# Patient Record
Sex: MALE | Race: WHITE | NOT HISPANIC OR LATINO | Employment: UNEMPLOYED | URBAN - METROPOLITAN AREA
[De-identification: names, ages, dates, MRNs, and addresses within clinical notes are randomized per-mention and may not be internally consistent; named-entity substitution may affect disease eponyms.]

---

## 2018-01-25 ENCOUNTER — OFFICE VISIT CONVERTED (OUTPATIENT)
Dept: SURGERY | Facility: CLINIC | Age: 33
End: 2018-01-25
Attending: SURGERY

## 2018-01-25 ENCOUNTER — CONVERSION ENCOUNTER (OUTPATIENT)
Dept: SURGERY | Facility: CLINIC | Age: 33
End: 2018-01-25

## 2018-08-17 ENCOUNTER — OFFICE VISIT CONVERTED (OUTPATIENT)
Dept: PODIATRY | Facility: CLINIC | Age: 33
End: 2018-08-17
Attending: PODIATRIST

## 2019-02-11 ENCOUNTER — HOSPITAL ENCOUNTER (OUTPATIENT)
Dept: OTHER | Facility: HOSPITAL | Age: 34
Discharge: HOME | End: 2019-02-11
Attending: PHYSICIAN ASSISTANT

## 2019-02-11 LAB
ALBUMIN SERPL-MCNC: 4.4 G/DL (ref 3.5–5)
ALBUMIN/GLOB SERPL: 1.5 {RATIO} (ref 1.4–2.6)
ALP SERPL-CCNC: 60 U/L (ref 53–128)
ALT SERPL-CCNC: 38 U/L (ref 10–40)
ANION GAP SERPL CALC-SCNC: 20 MMOL/L (ref 8–19)
AST SERPL-CCNC: 22 U/L (ref 15–50)
BASOPHILS # BLD AUTO: 0.01 10*3/UL (ref 0–0.2)
BASOPHILS NFR BLD AUTO: 0.13 % (ref 0–3)
BILIRUB SERPL-MCNC: 1.17 MG/DL (ref 0.2–1.3)
BUN SERPL-MCNC: 8 MG/DL (ref 5–25)
BUN/CREAT SERPL: 10 {RATIO} (ref 6–20)
CALCIUM SERPL-MCNC: 9.7 MG/DL (ref 8.7–10.4)
CHLORIDE SERPL-SCNC: 104 MMOL/L (ref 99–111)
CHOLEST SERPL-MCNC: 159 MG/DL (ref 107–200)
CHOLEST/HDLC SERPL: 4 {RATIO} (ref 3–6)
CONV CO2: 23 MMOL/L (ref 22–32)
CONV TOTAL PROTEIN: 7.3 G/DL (ref 6.3–8.2)
CREAT UR-MCNC: 0.81 MG/DL (ref 0.7–1.2)
EOSINOPHIL # BLD AUTO: 0.05 10*3/UL (ref 0–0.7)
EOSINOPHIL # BLD AUTO: 0.78 % (ref 0–7)
ERYTHROCYTE [DISTWIDTH] IN BLOOD BY AUTOMATED COUNT: 11.6 % (ref 11.5–14.5)
GFR SERPLBLD BASED ON 1.73 SQ M-ARVRAT: >60 ML/MIN/{1.73_M2}
GLOBULIN UR ELPH-MCNC: 2.9 G/DL (ref 2–3.5)
GLUCOSE SERPL-MCNC: 101 MG/DL (ref 70–99)
HBA1C MFR BLD: 16.7 G/DL (ref 14–18)
HCT VFR BLD AUTO: 46.4 % (ref 42–52)
HDLC SERPL-MCNC: 40 MG/DL (ref 40–60)
LDLC SERPL CALC-MCNC: 86 MG/DL (ref 70–100)
LYMPHOCYTES # BLD AUTO: 2.54 10*3/UL (ref 1–5)
MCH RBC QN AUTO: 31.9 PG (ref 27–31)
MCHC RBC AUTO-ENTMCNC: 35.9 G/DL (ref 33–37)
MCV RBC AUTO: 88.8 FL (ref 80–96)
MONOCYTES # BLD AUTO: 0.52 10*3/UL (ref 0.2–1.2)
MONOCYTES NFR BLD AUTO: 7.59 % (ref 3–10)
NEUTROPHILS # BLD AUTO: 3.76 10*3/UL (ref 2–8)
NEUTROPHILS NFR BLD AUTO: 54.6 % (ref 30–85)
NRBC BLD AUTO-RTO: 0 % (ref 0–0.01)
OSMOLALITY SERPL CALC.SUM OF ELEC: 294 MOSM/KG (ref 273–304)
PLATELET # BLD AUTO: 226 10*3/UL (ref 130–400)
PMV BLD AUTO: 8.6 FL (ref 7.4–10.4)
POTASSIUM SERPL-SCNC: 4 MMOL/L (ref 3.5–5.3)
RBC # BLD AUTO: 5.22 10*6/UL (ref 4.7–6.1)
SODIUM SERPL-SCNC: 143 MMOL/L (ref 135–147)
T4 FREE SERPL-MCNC: 1.1 NG/DL (ref 0.9–1.8)
TRIGL SERPL-MCNC: 165 MG/DL (ref 40–150)
TSH SERPL-ACNC: 1.88 M[IU]/L (ref 0.27–4.2)
VARIANT LYMPHS NFR BLD MANUAL: 36.9 % (ref 20–45)
VLDLC SERPL-MCNC: 33 MG/DL (ref 5–37)
WBC # BLD AUTO: 6.88 10*3/UL (ref 4.8–10.8)

## 2019-03-28 ENCOUNTER — OFFICE VISIT CONVERTED (OUTPATIENT)
Dept: CARDIOLOGY | Facility: CLINIC | Age: 34
End: 2019-03-28
Attending: INTERNAL MEDICINE

## 2019-10-03 ENCOUNTER — OFFICE VISIT CONVERTED (OUTPATIENT)
Dept: CARDIOLOGY | Facility: CLINIC | Age: 34
End: 2019-10-03
Attending: INTERNAL MEDICINE

## 2019-10-03 ENCOUNTER — CONVERSION ENCOUNTER (OUTPATIENT)
Dept: CARDIOLOGY | Facility: CLINIC | Age: 34
End: 2019-10-03

## 2020-05-06 ENCOUNTER — TELEPHONE CONVERTED (OUTPATIENT)
Dept: CARDIOLOGY | Facility: CLINIC | Age: 35
End: 2020-05-06
Attending: INTERNAL MEDICINE

## 2020-11-12 ENCOUNTER — OFFICE VISIT CONVERTED (OUTPATIENT)
Dept: CARDIOLOGY | Facility: CLINIC | Age: 35
End: 2020-11-12
Attending: INTERNAL MEDICINE

## 2021-05-10 ENCOUNTER — OFFICE VISIT CONVERTED (OUTPATIENT)
Dept: CARDIOLOGY | Facility: CLINIC | Age: 36
End: 2021-05-10
Attending: INTERNAL MEDICINE

## 2021-05-13 NOTE — PROGRESS NOTES
Progress Note      Patient Name: Vance Lorenzo   Patient ID: [id number]   Sex: Male   YOB: 1985    Primary Care Provider: Catalino PALMA   Referring Provider: Damien Castellano MD    Visit Date: May 6, 2020    Provider: Damien Castellano MD   Location: [facility]   Location Address: [address] [place] [address], [facility]   [place]  [id number]   Location Phone: [phone]          Chief Complaint  · Atrial fibrillation       History Of Present Illness  Video Conferencing Visit  Vance Lorenzo is a 35 year old /White male who is presenting for evaluation via video conferencing. Verbal consent obtained before beginning visit. Telehealth due to COVID-19. He has hypertension, paroxysmal atrial fibrillation and morbid obesity. He has been doing well. The patient has had some increased weight gain of about 15 pounds as well as some numbness in his left foot. He had one episode of atypical chest pain.   The following staff were present during this visit: Provider only.   PAST MEDICAL HISTORY: Significant for hypertension, paroxysmal atrial fibrillation, dyslipidemia.   FAMILY HISTORY: Negative for diabetes, hypertension and heart disease.   PSYCHOSOCIAL HISTORY: He never used alcohol or tobacco.   CURRENT MEDICATIONS: include Eliquis 5 mg b.i.d.; Metoprolol 100 mg b.i.d; Atorvastatin 20 mg daily. The dosage and frequency of the medications were reviewed with the patient.       Review of Systems  · Cardiovascular  o Admits  o : chest pain or angina pectoris   o Denies  o : palpitations (fast, fluttering, or skipping beats), swelling (feet, ankles, hands), shortness of breath while walking or lying flat  · Respiratory  o Denies  o : chronic or frequent cough, asthma or wheezing      Vitals     Per patient, at-home vitals are blood pressure 121/58, heart rate of 73.  Weight 370.           Assessment     ASSESSMENT AND PLAN:    1.  Paroxysmal atrial fibrillation:  Patient  maintaining normal sinus rhythm.  Continue with Eliquis. Counseled       him on weight loss and exercise as well as recommended he consider workup for sleep apnea.  2.  Hypertension controlled:  Encouraged weight loss and exercise.    MD Radha Ayala/bridger         This note was transcribed by Elizabeth Purcell.  dmd/radha   The above service was transcribed by Elizabeth Purcell, and I attest to the accuracy of the note.  RADHA.               Electronically Signed by: Elizabeth Purcell-, -Author on May 13, 2020 06:10:22 AM  Electronically Co-signed by: Damien Castellano MD -Reviewer on May 13, 2020 12:17:12 PM

## 2021-05-13 NOTE — PROGRESS NOTES
"   Progress Note      Patient Name: Vance Lorenzo   Patient ID: [id number]   Sex: Male   YOB: 1985    Primary Care Provider: Catalino PALMA   Referring Provider: Damien Castellano MD    Visit Date: November 12, 2020    Provider: Damien Castellano MD   Location: [facility]   Location Address: [address] [place] [address], [address]   [place]  [id number]   Location Phone: [phone]          Chief Complaint     Afib.       History Of Present Illness  Vance Lorenzo is a 35 year old /White male with a history of paroxysmal atrial fibrillation and morbid obesity who has been doing well. No new complaints or problems.   PAST MEDICAL HISTORY: Dyslipidemia; Hypertension; Paroxysmal atrial fibrillation.   PSYCHOSOCIAL HISTORY: Denies alcohol use.   CURRENT MEDICATIONS: Metoprolol succinate 100 mg b.i.d.; Eliquis 5 mg b.i.d.; atorvastatin 20 mg daily.       Review of Systems  · Cardiovascular  o Denies  o : palpitations (fast, fluttering, or skipping beats), swelling (feet, ankles, hands), shortness of breath while walking or lying flat, chest pain or angina pectoris   · Respiratory  o Denies  o : chronic or frequent cough      Vitals  Date Time BP Position Site L\R Cuff Size HR RR TEMP (F) WT  HT  BMI kg/m2 BSA m2 O2 Sat FR L/min FiO2 HC       11/12/2020 10:51 /82 Sitting    74 - R   364lbs 0oz 6'  5\" 43.16 2.99       11/12/2020 10:51 /86 Sitting                       Physical Examination  · Constitutional  o Appearance  o : Awake, alert, in no acute distress.   · Eyes  o Conjunctivae  o : Normal.  · Ears, Nose, Mouth and Throat  o Oral Cavity  o :   § Oral Mucosa  § : Normal.  · Neck  o Inspection/Palpation  o : No JVD. Good carotid upstroke. No thyromegaly.  · Respiratory  o Respiratory  o : Good respiratory effort. Clear to percussion and auscultation.  · Cardiovascular  o Heart  o :   § Auscultation of Heart  § : S1, S2 normal. Regular rate and rhythm without murmurs, gallops, " or rubs.  o Peripheral Vascular System  o :   § Extremities  § : Trace lower extremity edema.  · Gastrointestinal  o Abdominal Examination  o : Soft. No tenderness or masses felt. No hepatosplenomegaly. Abdominal aorta is not palpable.  · EKG  o EKG  o : Performed in the office today.  o Indications  o : Atrial fibrillation.  o Results  o : Normal sinus rhythm.  o Comparison  o : No change from prior EKG.           Assessment     ASSESSMENT & PLAN:    Paroxysmal atrial fibrillation, maintaining normal sinus rhythm.  Continue with current dose of metoprolol, as well as Eliquis for CVA prevention.  Counseled on importance of weight loss and exercise to minimize recurrence of Afib.             Electronically Signed by: Charo Valadez-, Other -Author on November 17, 2020 01:57:30 PM  Electronically Co-signed by: Damien Castellano MD -Reviewer on November 18, 2020 09:38:16 AM

## 2021-05-14 VITALS
BODY MASS INDEX: 37.19 KG/M2 | DIASTOLIC BLOOD PRESSURE: 82 MMHG | HEIGHT: 77 IN | WEIGHT: 315 LBS | HEART RATE: 74 BPM | SYSTOLIC BLOOD PRESSURE: 148 MMHG

## 2021-05-15 VITALS
WEIGHT: 315 LBS | SYSTOLIC BLOOD PRESSURE: 138 MMHG | DIASTOLIC BLOOD PRESSURE: 88 MMHG | BODY MASS INDEX: 37.19 KG/M2 | HEIGHT: 77 IN | HEART RATE: 66 BPM

## 2021-05-15 VITALS
DIASTOLIC BLOOD PRESSURE: 80 MMHG | HEIGHT: 77 IN | SYSTOLIC BLOOD PRESSURE: 152 MMHG | WEIGHT: 315 LBS | BODY MASS INDEX: 37.19 KG/M2 | HEART RATE: 72 BPM

## 2021-05-16 VITALS — BODY MASS INDEX: 37.19 KG/M2 | RESPIRATION RATE: 18 BRPM | HEIGHT: 77 IN | WEIGHT: 315 LBS

## 2021-05-16 VITALS
HEIGHT: 77 IN | BODY MASS INDEX: 37.19 KG/M2 | HEART RATE: 71 BPM | SYSTOLIC BLOOD PRESSURE: 151 MMHG | DIASTOLIC BLOOD PRESSURE: 83 MMHG | WEIGHT: 315 LBS | OXYGEN SATURATION: 97 %

## 2021-06-05 NOTE — PROGRESS NOTES
"   Progress Note      Patient Name: Vanec Lorenzo   Patient ID: [id number]   Sex: Male   YOB: 1985    Primary Care Provider: Catalino PALMA   Referring Provider: Damien Castellano MD    Visit Date: May 10, 2021    Provider: Damien Castellano MD   Location: [facility]   Location Address: [address] [place] [address], [facility]   [place]  [id number]   Location Phone: [phone]          Chief Complaint     Atrial fibrillation.       History Of Present Illness  REFERRING CARE PROVIDER: Damien Castellano MD   Vance Lorenzo is a 36 year old /White male with paroxysmal atrial fibrillation, morbid obesity, hypertension, and dyslipidemia. He has had no recurrent tachycardia spells or symptoms.   PAST MEDICAL HISTORY: Dyslipidemia; Hypertension; Paroxysmal atrial fibrillation.   PSYCHOSOCIAL HISTORY: Denies alcohol consumption.   CURRENT MEDICATIONS: Medications have been reviewed and are as stated.      ALLERGIES:  No known drug allergies.       Review of Systems  · Cardiovascular  o Denies  o : palpitations (fast, fluttering, or skipping beats), swelling (feet, ankles, hands), shortness of breath while walking or lying flat, chest pain or angina pectoris   · Respiratory  o Denies  o : chronic or frequent cough      Vitals  Date Time BP Position Site L\R Cuff Size HR RR TEMP (F) WT  HT  BMI kg/m2 BSA m2 O2 Sat FR L/min FiO2 HC       05/10/2021 01:50 /84 Sitting    66 - R   365lbs 0oz 6'  5\" 43.28 3             Physical Examination  · Constitutional  o Appearance  o : Awake, alert, in no acute distress.   · Eyes  o Conjunctivae  o : Normal.  · Ears, Nose, Mouth and Throat  o Oral Cavity  o :   § Oral Mucosa  § : Normal.  · Neck  o Inspection/Palpation  o : No JVD. Good carotid upstroke. No thyromegaly.  · Respiratory  o Respiratory  o : Good respiratory effort. Clear to percussion and auscultation.  · Cardiovascular  o Heart  o :   § Auscultation of Heart  § : S1, S2 normal. Regular rate and " rhythm without murmurs, gallops, or rubs.  o Peripheral Vascular System  o :   § Extremities  § : Good femoral and pedal pulses. No pedal edema.  · Gastrointestinal  o Abdominal Examination  o : Soft. No tenderness or masses felt. No hepatosplenomegaly. Abdominal aorta is not palpable.          Assessment     1.  Paroxysmal atrial fibrillation, symptomatic. Maintain in normal sinus rhythm. On Eliquis 5 mg b.i.d. for CVA prevention. Repeat EKG on next visit and on metoprolol 100 mg twice a day for rate control as well as blood pressure medications.  2.  Hypertension, controlled. Continue to stress the importance of both exercise and weight loss for his blood pressure as well as for his atrial fibrillation.        Damien Castellano MD  /               Electronically Signed by: May Espinal-, OT -Author on May 11, 2021 07:20:48 AM  Electronically Co-signed by: Damien Castellano MD -Reviewer on May 12, 2021 03:38:16 PM

## 2021-07-08 RX ORDER — METOPROLOL SUCCINATE 100 MG/1
TABLET, EXTENDED RELEASE ORAL
Qty: 60 TABLET | Refills: 10 | Status: SHIPPED | OUTPATIENT
Start: 2021-07-08 | End: 2022-06-06

## 2021-07-15 ENCOUNTER — OFFICE VISIT (OUTPATIENT)
Dept: OTOLARYNGOLOGY | Facility: CLINIC | Age: 36
End: 2021-07-15

## 2021-07-15 ENCOUNTER — PROCEDURE VISIT (OUTPATIENT)
Dept: OTOLARYNGOLOGY | Facility: CLINIC | Age: 36
End: 2021-07-15

## 2021-07-15 VITALS
SYSTOLIC BLOOD PRESSURE: 140 MMHG | WEIGHT: 315 LBS | DIASTOLIC BLOOD PRESSURE: 84 MMHG | HEART RATE: 66 BPM | BODY MASS INDEX: 37.19 KG/M2 | HEIGHT: 77 IN

## 2021-07-15 VITALS — WEIGHT: 315 LBS | BODY MASS INDEX: 37.19 KG/M2 | TEMPERATURE: 97.3 F | HEIGHT: 77 IN

## 2021-07-15 DIAGNOSIS — H93.13 TINNITUS OF BOTH EARS: ICD-10-CM

## 2021-07-15 DIAGNOSIS — H92.13 OTORRHEA OF BOTH EARS: ICD-10-CM

## 2021-07-15 DIAGNOSIS — H93.13 TINNITUS OF BOTH EARS: Primary | ICD-10-CM

## 2021-07-15 PROCEDURE — 92567 TYMPANOMETRY: CPT | Performed by: AUDIOLOGIST

## 2021-07-15 PROCEDURE — 92557 COMPREHENSIVE HEARING TEST: CPT | Performed by: AUDIOLOGIST

## 2021-07-15 PROCEDURE — 99203 OFFICE O/P NEW LOW 30 MIN: CPT | Performed by: OTOLARYNGOLOGY

## 2021-07-15 RX ORDER — LISINOPRIL AND HYDROCHLOROTHIAZIDE 20; 12.5 MG/1; MG/1
1 TABLET ORAL DAILY
COMMUNITY
End: 2021-12-01

## 2021-07-15 RX ORDER — ATORVASTATIN CALCIUM 20 MG/1
20 TABLET, FILM COATED ORAL
COMMUNITY
Start: 2021-07-08 | End: 2021-12-14

## 2021-07-15 RX ORDER — ACETAMINOPHEN 500 MG
500 TABLET ORAL AS NEEDED
COMMUNITY
End: 2022-08-01

## 2021-07-15 RX ORDER — APIXABAN 5 MG/1
5 TABLET, FILM COATED ORAL 2 TIMES DAILY
COMMUNITY
Start: 2021-07-08 | End: 2021-12-01 | Stop reason: SDUPTHER

## 2021-07-15 NOTE — PROGRESS NOTES
Patient Name: Vance Lorenzo   Visit Date: 07/15/2021   Patient ID: [id number]  Provider: Varun Camacho MD    Sex: male  Location: [facility]   YOB: 1985  Location Address: [address] [place] [address], [address] [place] [address],?[place]?[id number]    Primary Care Provider Catalino Emery PA  Location Phone: [phone]    Referring Provider: No ref. provider found        Chief Complaint  Tinnitus    Subjective    History of Present Illness  Vance Lorenzo is a 36 y.o. male who presents to [facility] today as a consult from No ref. provider found.    He presents the clinic today for evaluation of ear ringing that has been present for about a year.  He notes no previous history of eustachian tube dysfunction recurrent otitis as a child.  He notes that his hearing is pretty good in day-to-day situations.  He denies any frequent issues with ear infections or ear pressure or other symptoms.  He notes that the ear ringing sounds high-pitched and nonpulsatile, kind of like a static sound.  It is equal in both ears.  He does listen to ear buds, sometimes at a loud level.  He denies any family history of hearing loss and early age.  He denies any significant loud noise exposure, but does have a history of shooting guns recreationally, as well as his music. He has not had a hearing test in quite some time.    Past Medical History:   Diagnosis Date   • Atrial fibrillation (CMS/HCC)    • Dermatitis 08/17/2018   • Foot pain, left 08/17/2018   • Foot pain, right 08/17/2018   • High blood pressure    • High cholesterol    • Tinnitus    • Xerosis of skin 08/17/2018       History reviewed. No pertinent surgical history.      Current Outpatient Medications:   •  atorvastatin (LIPITOR) 20 MG tablet, Take 20 mg by mouth every night at bedtime., Disp: , Rfl:   •  Eliquis 5 MG tablet tablet, Take 5 mg by mouth 2 (Two) Times a Day., Disp: , Rfl:   •   "lisinopril-hydrochlorothiazide (PRINZIDE,ZESTORETIC) 20-12.5 MG per tablet, Take 1 tablet by mouth Daily., Disp: , Rfl:   •  metoprolol succinate XL (TOPROL-XL) 100 MG 24 hr tablet, TAKE ONE TABLET BY MOUTH TWICE DAILY, Disp: 60 tablet, Rfl: 10  •  acetaminophen (TYLENOL) 500 MG tablet, Take 500 mg by mouth., Disp: , Rfl:      Not on File    Family History   Problem Relation Age of Onset   • Breast cancer Maternal Grandmother         50s   • Cancer Paternal Grandmother    • Cancer Paternal Grandfather         Social History     Social History Narrative   • Not on file       Objective     Vital Signs:   Temp 97.3 °F (36.3 °C) (Temporal)   Ht 195.6 cm (77\")   Wt (!) 169 kg (371 lb 12.8 oz)   BMI 44.09 kg/m²       Physical Exam         Constitutional   Appearance  · : well developed, well-nourished, alert and in no acute distress, voice clear and strong    Head  Inspection  · : no deformities or lesions  Face  Inspection  · : No facial lesions; House-Brackmann I/VI bilaterally  Palpation  · : No TMJ crepitus nor  muscle tenderness bilaterally    Eyes  Vision  Visual Fields  · : Extraocular movements are intact. No spontaneous or gaze-induced nystagmus.  Conjunctivae  · : clear  Sclerae  · : clear  Pupils and Irises  · : pupils equal, round, and reactive to light.     Ears, Nose, Mouth and Throat    Ears    External Ears  · : appearance within normal limits, no lesions present  Otoscopic Examination  · : Tympanic membrane appearance within normal limits bilaterally without perforations, well-aerated middle ears  Hearing  · : intact to conversational voice both ears  Tunning fork testing:     :    Nose    External Nose  · : appearance normal  Intranasal Exam  · : mucosa within normal limits, vestibules normal, no intranasal lesions present, septum midline, sinuses non tender to percussion  Oral Cavity    Oral Mucosa  · : oral mucosa normal without pallor or cyanosis  Lips  · : lip appearance " normal  Teeth  · : normal dentition for age  Gums  · : gums pink, non-swollen, no bleeding present  Tongue  · : tongue appearance normal; normal mobility  Palate  · : hard palate normal, soft palate appearance normal with symmetric mobility    Throat    Oropharynx  · : no inflammation or lesions present, tonsils within normal limits  Hypopharynx  · : appearance within normal limits, superior epiglottis within normal limits  Larynx  · : appearance within normal limits, vocal cords within normal limits, no lesions present    Neck  Inspection/Palpation  · : normal appearance, no masses or tenderness, trachea midline; thyroid size normal, nontender, no nodules or masses present on palpation    Respiratory  Respiratory Effort  · : breathing unlabored  Inspection of Chest  · : normal appearance, no retractions    Cardiovascular  Heart  · : regular rate and rhythm    Lymphatic  Neck  · : no lymphadenopathy present  Supraclavicular Nodes  · : no lymphadenopathy present  Preauricular Nodes  · : no lymphadenopathy present    Skin and Subcutaneous Tissue  General Inspection  · : Regarding face and neck - there are no rashes present, no lesions present, and no areas of discoloration    Neurologic  Cranial Nerves  · : cranial nerves II-XII are grossly intact bilaterally  Gait and Station  · : normal gait, able to stand without diffculty    Psychiatric  Judgement and Insight  · : judgment and insight intact  Mood and Affect  · : mood normal, affect appropriate          Assessment and Plan    Diagnoses and all orders for this visit:    1. Tinnitus of both ears (Primary)    2. Otorrhea of both ears    Examination revealed normal-appearing ears on both sides with healthy appearing eardrums and well-aerated middle ears.  An audiogram was obtained showing normal hearing bilaterally, however there is a dip between 2000 - 6000 Hz going down to borderline normal hearing.  This may be the early signs of a noise notch, and may be there  reason for the ear ringing.  He is also on a number of medications for atrial fibrillation, but has been on them for many years.  I discussed the possibility of medications being contributory to his ear ringing as well.  Tympanograms were normal word discrimination scores were 100% bilaterally.  We discussed loud noise exposure and preventative precautions.  I also recommended melatonin 3 mg nightly for 1 month as a trial as well as background noise distraction techniques for the ear ringing.    Follow Up   No follow-ups on file.  Patient was given instructions and counseling regarding his condition or for health maintenance advice. Please see specific information pulled into the AVS if appropriate.    The patient is a 66y Female complaining of

## 2021-10-27 ENCOUNTER — HOSPITAL ENCOUNTER (EMERGENCY)
Facility: HOSPITAL | Age: 36
Discharge: HOME | End: 2021-10-27
Attending: EMERGENCY MEDICINE | Admitting: EMERGENCY MEDICINE

## 2021-10-27 ENCOUNTER — APPOINTMENT (OUTPATIENT)
Dept: CT IMAGING | Facility: HOSPITAL | Age: 36
End: 2021-10-27

## 2021-10-27 ENCOUNTER — APPOINTMENT (OUTPATIENT)
Dept: ULTRASOUND IMAGING | Facility: HOSPITAL | Age: 36
End: 2021-10-27

## 2021-10-27 VITALS
HEART RATE: 84 BPM | DIASTOLIC BLOOD PRESSURE: 80 MMHG | SYSTOLIC BLOOD PRESSURE: 154 MMHG | OXYGEN SATURATION: 97 % | HEIGHT: 77 IN | RESPIRATION RATE: 18 BRPM | TEMPERATURE: 98.5 F | WEIGHT: 315 LBS | BODY MASS INDEX: 37.19 KG/M2

## 2021-10-27 DIAGNOSIS — N28.1 RENAL CYST: ICD-10-CM

## 2021-10-27 DIAGNOSIS — R10.9 RIGHT FLANK PAIN: Primary | ICD-10-CM

## 2021-10-27 LAB
ALBUMIN SERPL-MCNC: 4.8 G/DL (ref 3.5–5.2)
ALBUMIN/GLOB SERPL: 1.8 G/DL
ALP SERPL-CCNC: 62 U/L (ref 39–117)
ALT SERPL W P-5'-P-CCNC: 40 U/L (ref 1–41)
ANION GAP SERPL CALCULATED.3IONS-SCNC: 14 MMOL/L (ref 5–15)
AST SERPL-CCNC: 28 U/L (ref 1–40)
BACTERIA UR QL AUTO: ABNORMAL /HPF
BASOPHILS # BLD AUTO: 0.04 10*3/MM3 (ref 0–0.2)
BASOPHILS NFR BLD AUTO: 0.6 % (ref 0–1.5)
BILIRUB SERPL-MCNC: 1 MG/DL (ref 0–1.2)
BILIRUB UR QL STRIP: NEGATIVE
BUN SERPL-MCNC: 10 MG/DL (ref 6–20)
BUN/CREAT SERPL: 11.4 (ref 7–25)
CALCIUM SPEC-SCNC: 9.5 MG/DL (ref 8.6–10.5)
CHLORIDE SERPL-SCNC: 103 MMOL/L (ref 98–107)
CLARITY UR: CLEAR
CO2 SERPL-SCNC: 24 MMOL/L (ref 22–29)
COLOR UR: YELLOW
CREAT SERPL-MCNC: 0.88 MG/DL (ref 0.76–1.27)
DEPRECATED RDW RBC AUTO: 40.6 FL (ref 37–54)
EOSINOPHIL # BLD AUTO: 0.07 10*3/MM3 (ref 0–0.4)
EOSINOPHIL NFR BLD AUTO: 1 % (ref 0.3–6.2)
ERYTHROCYTE [DISTWIDTH] IN BLOOD BY AUTOMATED COUNT: 12.4 % (ref 12.3–15.4)
GFR SERPL CREATININE-BSD FRML MDRD: 99 ML/MIN/1.73
GLOBULIN UR ELPH-MCNC: 2.7 GM/DL
GLUCOSE SERPL-MCNC: 165 MG/DL (ref 65–99)
GLUCOSE UR STRIP-MCNC: NEGATIVE MG/DL
HCT VFR BLD AUTO: 45.6 % (ref 37.5–51)
HGB BLD-MCNC: 16 G/DL (ref 13–17.7)
HGB UR QL STRIP.AUTO: ABNORMAL
HOLD SPECIMEN: NORMAL
HOLD SPECIMEN: NORMAL
HYALINE CASTS UR QL AUTO: ABNORMAL /LPF
IMM GRANULOCYTES # BLD AUTO: 0.05 10*3/MM3 (ref 0–0.05)
IMM GRANULOCYTES NFR BLD AUTO: 0.7 % (ref 0–0.5)
KETONES UR QL STRIP: NEGATIVE
LEUKOCYTE ESTERASE UR QL STRIP.AUTO: NEGATIVE
LIPASE SERPL-CCNC: 41 U/L (ref 13–60)
LYMPHOCYTES # BLD AUTO: 2 10*3/MM3 (ref 0.7–3.1)
LYMPHOCYTES NFR BLD AUTO: 28.9 % (ref 19.6–45.3)
MCH RBC QN AUTO: 31.6 PG (ref 26.6–33)
MCHC RBC AUTO-ENTMCNC: 35.1 G/DL (ref 31.5–35.7)
MCV RBC AUTO: 90.1 FL (ref 79–97)
MONOCYTES # BLD AUTO: 0.52 10*3/MM3 (ref 0.1–0.9)
MONOCYTES NFR BLD AUTO: 7.5 % (ref 5–12)
NEUTROPHILS NFR BLD AUTO: 4.25 10*3/MM3 (ref 1.7–7)
NEUTROPHILS NFR BLD AUTO: 61.3 % (ref 42.7–76)
NITRITE UR QL STRIP: NEGATIVE
NRBC BLD AUTO-RTO: 0 /100 WBC (ref 0–0.2)
PH UR STRIP.AUTO: 5.5 [PH] (ref 5–8)
PLATELET # BLD AUTO: 222 10*3/MM3 (ref 140–450)
PMV BLD AUTO: 11.6 FL (ref 6–12)
POTASSIUM SERPL-SCNC: 4 MMOL/L (ref 3.5–5.2)
PROT SERPL-MCNC: 7.5 G/DL (ref 6–8.5)
PROT UR QL STRIP: NEGATIVE
RBC # BLD AUTO: 5.06 10*6/MM3 (ref 4.14–5.8)
RBC # UR: ABNORMAL /HPF
REF LAB TEST METHOD: ABNORMAL
SODIUM SERPL-SCNC: 141 MMOL/L (ref 136–145)
SP GR UR STRIP: 1.02 (ref 1–1.03)
SQUAMOUS #/AREA URNS HPF: ABNORMAL /HPF
UROBILINOGEN UR QL STRIP: ABNORMAL
WBC # BLD AUTO: 6.93 10*3/MM3 (ref 3.4–10.8)
WBC UR QL AUTO: ABNORMAL /HPF
WHOLE BLOOD HOLD SPECIMEN: NORMAL
WHOLE BLOOD HOLD SPECIMEN: NORMAL

## 2021-10-27 PROCEDURE — 83690 ASSAY OF LIPASE: CPT

## 2021-10-27 PROCEDURE — 76775 US EXAM ABDO BACK WALL LIM: CPT

## 2021-10-27 PROCEDURE — 80053 COMPREHEN METABOLIC PANEL: CPT

## 2021-10-27 PROCEDURE — 81001 URINALYSIS AUTO W/SCOPE: CPT

## 2021-10-27 PROCEDURE — 0 IOPAMIDOL PER 1 ML: Performed by: EMERGENCY MEDICINE

## 2021-10-27 PROCEDURE — 99283 EMERGENCY DEPT VISIT LOW MDM: CPT

## 2021-10-27 PROCEDURE — 74177 CT ABD & PELVIS W/CONTRAST: CPT

## 2021-10-27 PROCEDURE — 85025 COMPLETE CBC W/AUTO DIFF WBC: CPT

## 2021-10-27 PROCEDURE — 36415 COLL VENOUS BLD VENIPUNCTURE: CPT

## 2021-10-27 RX ORDER — SODIUM CHLORIDE 0.9 % (FLUSH) 0.9 %
10 SYRINGE (ML) INJECTION AS NEEDED
Status: DISCONTINUED | OUTPATIENT
Start: 2021-10-27 | End: 2021-10-28 | Stop reason: HOSPADM

## 2021-10-27 RX ADMIN — IOPAMIDOL 100 ML: 755 INJECTION, SOLUTION INTRAVENOUS at 19:42

## 2021-12-01 ENCOUNTER — OFFICE VISIT (OUTPATIENT)
Dept: CARDIOLOGY | Facility: CLINIC | Age: 36
End: 2021-12-01

## 2021-12-01 VITALS
HEIGHT: 77 IN | HEART RATE: 66 BPM | SYSTOLIC BLOOD PRESSURE: 154 MMHG | BODY MASS INDEX: 37.19 KG/M2 | WEIGHT: 315 LBS | DIASTOLIC BLOOD PRESSURE: 88 MMHG

## 2021-12-01 DIAGNOSIS — I10 ESSENTIAL HYPERTENSION: ICD-10-CM

## 2021-12-01 DIAGNOSIS — E78.5 HYPERLIPIDEMIA LDL GOAL <100: ICD-10-CM

## 2021-12-01 DIAGNOSIS — I48.0 PAROXYSMAL ATRIAL FIBRILLATION (HCC): Primary | ICD-10-CM

## 2021-12-01 PROBLEM — H93.19 TINNITUS: Status: ACTIVE | Noted: 2021-12-01

## 2021-12-01 PROCEDURE — 99214 OFFICE O/P EST MOD 30 MIN: CPT | Performed by: NURSE PRACTITIONER

## 2021-12-01 PROCEDURE — 93000 ELECTROCARDIOGRAM COMPLETE: CPT | Performed by: INTERNAL MEDICINE

## 2021-12-01 RX ORDER — APIXABAN 5 MG/1
5 TABLET, FILM COATED ORAL 2 TIMES DAILY
Qty: 60 TABLET | Refills: 11 | Status: SHIPPED | OUTPATIENT
Start: 2021-12-01 | End: 2022-11-30

## 2021-12-01 NOTE — PATIENT INSTRUCTIONS
"Low-Sodium Eating Plan  Sodium, which is an element that makes up salt, helps you maintain a healthy balance of fluids in your body. Too much sodium can increase your blood pressure and cause fluid and waste to be held in your body.  Your health care provider or dietitian may recommend following this plan if you have high blood pressure (hypertension), kidney disease, liver disease, or heart failure. Eating less sodium can help lower your blood pressure, reduce swelling, and protect your heart, liver, and kidneys.  What are tips for following this plan?  Reading food labels  · The Nutrition Facts label lists the amount of sodium in one serving of the food. If you eat more than one serving, you must multiply the listed amount of sodium by the number of servings.  · Choose foods with less than 140 mg of sodium per serving.  · Avoid foods with 300 mg of sodium or more per serving.  Shopping    · Look for lower-sodium products, often labeled as \"low-sodium\" or \"no salt added.\"  · Always check the sodium content, even if foods are labeled as \"unsalted\" or \"no salt added.\"  · Buy fresh foods.  ? Avoid canned foods and pre-made or frozen meals.  ? Avoid canned, cured, or processed meats.  · Buy breads that have less than 80 mg of sodium per slice.    Cooking    · Eat more home-cooked food and less restaurant, buffet, and fast food.  · Avoid adding salt when cooking. Use salt-free seasonings or herbs instead of table salt or sea salt. Check with your health care provider or pharmacist before using salt substitutes.  · Cook with plant-based oils, such as canola, sunflower, or olive oil.    Meal planning  · When eating at a restaurant, ask that your food be prepared with less salt or no salt, if possible. Avoid dishes labeled as brined, pickled, cured, smoked, or made with soy sauce, miso, or teriyaki sauce.  · Avoid foods that contain MSG (monosodium glutamate). MSG is sometimes added to Chinese food, bouillon, and some " "canned foods.  · Make meals that can be grilled, baked, poached, roasted, or steamed. These are generally made with less sodium.  General information  Most people on this plan should limit their sodium intake to 1,500-2,000 mg (milligrams) of sodium each day.  What foods should I eat?  Fruits  Fresh, frozen, or canned fruit. Fruit juice.  Vegetables  Fresh or frozen vegetables. \"No salt added\" canned vegetables. \"No salt added\" tomato sauce and paste. Low-sodium or reduced-sodium tomato and vegetable juice.  Grains  Low-sodium cereals, including oats, puffed wheat and rice, and shredded wheat. Low-sodium crackers. Unsalted rice. Unsalted pasta. Low-sodium bread. Whole-grain breads and whole-grain pasta.  Meats and other proteins  Fresh or frozen (no salt added) meat, poultry, seafood, and fish. Low-sodium canned tuna and salmon. Unsalted nuts. Dried peas, beans, and lentils without added salt. Unsalted canned beans. Eggs. Unsalted nut butters.  Dairy  Milk. Soy milk. Cheese that is naturally low in sodium, such as ricotta cheese, fresh mozzarella, or Swiss cheese. Low-sodium or reduced-sodium cheese. Cream cheese. Yogurt.  Seasonings and condiments  Fresh and dried herbs and spices. Salt-free seasonings. Low-sodium mustard and ketchup. Sodium-free salad dressing. Sodium-free light mayonnaise. Fresh or refrigerated horseradish. Lemon juice. Vinegar.  Other foods  Homemade, reduced-sodium, or low-sodium soups. Unsalted popcorn and pretzels. Low-salt or salt-free chips.  The items listed above may not be a complete list of foods and beverages you can eat. Contact a dietitian for more information.  What foods should I avoid?  Vegetables  Sauerkraut, pickled vegetables, and relishes. Olives. French fries. Onion rings. Regular canned vegetables (not low-sodium or reduced-sodium). Regular canned tomato sauce and paste (not low-sodium or reduced-sodium). Regular tomato and vegetable juice (not low-sodium or reduced-sodium). " Frozen vegetables in sauces.  Grains  Instant hot cereals. Bread stuffing, pancake, and biscuit mixes. Croutons. Seasoned rice or pasta mixes. Noodle soup cups. Boxed or frozen macaroni and cheese. Regular salted crackers. Self-rising flour.  Meats and other proteins  Meat or fish that is salted, canned, smoked, spiced, or pickled. Precooked or cured meat, such as sausages or meat loaves. Obwers. Ham. Pepperoni. Hot dogs. Corned beef. Chipped beef. Salt pork. Jerky. Pickled herring. Anchovies and sardines. Regular canned tuna. Salted nuts.  Dairy  Processed cheese and cheese spreads. Hard cheeses. Cheese curds. Blue cheese. Feta cheese. String cheese. Regular cottage cheese. Buttermilk. Canned milk.  Fats and oils  Salted butter. Regular margarine. Ghee. Bowers fat.  Seasonings and condiments  Onion salt, garlic salt, seasoned salt, table salt, and sea salt. Canned and packaged gravies. Worcestershire sauce. Tartar sauce. Barbecue sauce. Teriyaki sauce. Soy sauce, including reduced-sodium. Steak sauce. Fish sauce. Oyster sauce. Cocktail sauce. Horseradish that you find on the shelf. Regular ketchup and mustard. Meat flavorings and tenderizers. Bouillon cubes. Hot sauce. Pre-made or packaged marinades. Pre-made or packaged taco seasonings. Relishes. Regular salad dressings. Salsa.  Other foods  Salted popcorn and pretzels. Corn chips and puffs. Potato and tortilla chips. Canned or dried soups. Pizza. Frozen entrees and pot pies.  The items listed above may not be a complete list of foods and beverages you should avoid. Contact a dietitian for more information.  Summary  · Eating less sodium can help lower your blood pressure, reduce swelling, and protect your heart, liver, and kidneys.  · Most people on this plan should limit their sodium intake to 1,500-2,000 mg (milligrams) of sodium each day.  · Canned, boxed, and frozen foods are high in sodium. Restaurant foods, fast foods, and pizza are also very high in sodium.  You also get sodium by adding salt to food.  · Try to cook at home, eat more fresh fruits and vegetables, and eat less fast food and canned, processed, or prepared foods.  This information is not intended to replace advice given to you by your health care provider. Make sure you discuss any questions you have with your health care provider.  Document Revised: 01/22/2021 Document Reviewed: 11/18/2020  [employer] Patient Education © 2021 [employer]      Cholesterol Content in Foods  Cholesterol is a waxy, fat-like substance that helps to carry fat in the blood. The body needs cholesterol in small amounts, but too much cholesterol can cause damage to the arteries and heart.  Most people should eat less than 200 milligrams (mg) of cholesterol a day.  Foods with cholesterol    Cholesterol is found in animal-based foods, such as meat, seafood, and dairy. Generally, low-fat dairy and lean meats have less cholesterol than full-fat dairy and fatty meats. The milligrams of cholesterol per serving (mg per serving) of common cholesterol-containing foods are listed below.  Meat and other proteins  · Egg -- one large whole egg has 186 mg.  · Veal shank -- 4 oz has 141 mg.  · Lean ground turkey (93% lean) -- 4 oz has 118 mg.  · Fat-trimmed lamb loin -- 4 oz has 106 mg.  · Lean ground beef (90% lean) -- 4 oz has 100 mg.  · Lobster -- 3.5 oz has 90 mg.  · Pork loin chops -- 4 oz has 86 mg.  · Canned salmon -- 3.5 oz has 83 mg.  · Fat-trimmed beef top loin -- 4 oz has 78 mg.  · Frankfurter -- 1 saskia (3.5 oz) has 77 mg.  · Crab -- 3.5 oz has 71 mg.  · Roasted chicken without skin, white meat -- 4 oz has 66 mg.  · Light bologna -- 2 oz has 45 mg.  · Deli-cut turkey -- 2 oz has 31 mg.  · Canned tuna -- 3.5 oz has 31 mg.  · Bowers -- 1 oz has 29 mg.  · Oysters and mussels (raw) -- 3.5 oz has 25 mg.  · Mackerel -- 1 oz has 22 mg.  · Trout -- 1 oz has 20 mg.  · Pork sausage -- 1 link (1 oz) has 17 mg.  · Belcher -- 1 oz has 16  mg.  · Tilapia -- 1 oz has 14 mg.  Dairy  · Soft-serve ice cream -- ½ cup (4 oz) has 103 mg.  · Whole-milk yogurt -- 1 cup (8 oz) has 29 mg.  · Cheddar cheese -- 1 oz has 28 mg.  · American cheese -- 1 oz has 28 mg.  · Whole milk -- 1 cup (8 oz) has 23 mg.  · 2% milk -- 1 cup (8 oz) has 18 mg.  · Cream cheese -- 1 tablespoon (Tbsp) has 15 mg.  · Cottage cheese -- ½ cup (4 oz) has 14 mg.  · Low-fat (1%) milk -- 1 cup (8 oz) has 10 mg.  · Sour cream -- 1 Tbsp has 8.5 mg.  · Low-fat yogurt -- 1 cup (8 oz) has 8 mg.  · Nonfat Greek yogurt -- 1 cup (8 oz) has 7 mg.  · Half-and-half cream -- 1 Tbsp has 5 mg.  Fats and oils  · Cod liver oil -- 1 tablespoon (Tbsp) has 82 mg.  · Butter -- 1 Tbsp has 15 mg.  · Lard -- 1 Tbsp has 14 mg.  · Bowers grease -- 1 Tbsp has 14 mg.  · Mayonnaise -- 1 Tbsp has 5-10 mg.  · Margarine -- 1 Tbsp has 3-10 mg.  Exact amounts of cholesterol in these foods may vary depending on specific ingredients and brands.  Foods without cholesterol  Most plant-based foods do not have cholesterol unless you combine them with a food that has cholesterol. Foods without cholesterol include:  · Grains and cereals.  · Vegetables.  · Fruits.  · Vegetable oils, such as olive, canola, and sunflower oil.  · Legumes, such as peas, beans, and lentils.  · Nuts and seeds.  · Egg whites.  Summary  · The body needs cholesterol in small amounts, but too much cholesterol can cause damage to the arteries and heart.  · Most people should eat less than 200 milligrams (mg) of cholesterol a day.  This information is not intended to replace advice given to you by your health care provider. Make sure you discuss any questions you have with your health care provider.  Document Revised: 05/10/2021 Document Reviewed: 05/10/2021  [employer] Patient Education © 2021 [employer]

## 2021-12-01 NOTE — PROGRESS NOTES
Chief Complaint  Follow-up (6 month )    Subjective     {Problem List  Visit Diagnosis   Encounters  Notes  Medications  Labs  Result Review Imaging  Media :23}       History of Present Illness  Vance Lorenzo is a 36-year-old white/ male patient who presents to the office today for follow-up.  He has paroxysmal atrial fibrillation, hypertension, and hyperlipidemia.  He reports compliance with all of his medications.  He denies any chest pain, shortness of breath, lightheadedness/dizziness, palpitations, or edema.    PMH  Past Medical History:   Diagnosis Date   • Essential hypertension 12/1/2021   • Foot pain, bilateral 08/17/2018   • Hyperlipidemia LDL goal <100 12/1/2021   • Paroxysmal atrial fibrillation 12/1/2021   • Tinnitus    • Xerosis of skin 08/17/2018         ALLERGY  No Known Allergies       SURGICALHX  History reviewed. No pertinent surgical history.       SOC  Social History     Socioeconomic History   • Marital status: Other   Tobacco Use   • Smoking status: Never Smoker   • Smokeless tobacco: Never Used   Vaping Use   • Vaping Use: Never used   Substance and Sexual Activity   • Alcohol use: Never   • Drug use: Never   • Sexual activity: Defer         FAMHX  Family History   Problem Relation Age of Onset   • Breast cancer Maternal Grandmother         50s   • Cancer Paternal Grandmother    • Cancer Paternal Grandfather           MEDSIGONLY  Current Outpatient Medications on File Prior to Visit   Medication Sig   • atorvastatin (LIPITOR) 20 MG tablet Take 20 mg by mouth every night at bedtime.   • Eliquis 5 MG tablet tablet Take 5 mg by mouth 2 (Two) Times a Day.   • metoprolol succinate XL (TOPROL-XL) 100 MG 24 hr tablet TAKE ONE TABLET BY MOUTH TWICE DAILY   • acetaminophen (TYLENOL) 500 MG tablet Take 500 mg by mouth.   • lisinopril-hydrochlorothiazide (PRINZIDE,ZESTORETIC) 20-12.5 MG per tablet Take 1 tablet by mouth Daily.     No current facility-administered medications on  "file prior to visit.         Objective   /88 (BP Location: Left arm, Patient Position: Sitting)   Pulse 66   Ht 195.6 cm (77\")   Wt (!) 163 kg (360 lb)   BMI 42.69 kg/m²       Physical Exam  Constitutional:       Appearance: He is obese.   HENT:      Head: Normocephalic.   Neck:      Vascular: No carotid bruit.   Cardiovascular:      Rate and Rhythm: Normal rate and regular rhythm.      Pulses: Normal pulses.      Heart sounds: Normal heart sounds. No murmur heard.      Pulmonary:      Effort: Pulmonary effort is normal.      Breath sounds: Normal breath sounds.   Musculoskeletal:      Cervical back: Neck supple.      Right lower leg: No edema.      Left lower leg: No edema.   Skin:     General: Skin is dry.      Capillary Refill: Capillary refill takes less than 2 seconds.   Neurological:      Mental Status: He is alert and oriented to person, place, and time.   Psychiatric:         Behavior: Behavior normal.       ECG 12 Lead    Date/Time: 12/1/2021 3:01 PM  Performed by: Samia Veliz APRN  Authorized by: Damien Castellano MD   Comparison: compared with previous ECG   Similar to previous ECG  Rhythm: sinus rhythm  Rate: normal  BPM: 63  Conduction: conduction normal  ST Segments: ST segments normal  T Waves: T waves normal  QRS axis: normal  Other: no other findings    Clinical impression: normal ECG          Result Review :   The following data was reviewed by: MARSHA Crespo on 12/01/2021:  No results found for: PROBNP  CMP    CMP 10/27/21   Glucose 165 (A)   BUN 10   Creatinine 0.88   eGFR Non African Am 99   Sodium 141   Potassium 4.0   Chloride 103   Calcium 9.5   Albumin 4.80   Total Bilirubin 1.0   Alkaline Phosphatase 62   AST (SGOT) 28   ALT (SGPT) 40   (A) Abnormal value       Comments are available for some flowsheets but are not being displayed.           CBC w/diff    CBC w/Diff 10/27/21   WBC 6.93   RBC 5.06   Hemoglobin 16.0   Hematocrit 45.6   MCV 90.1   MCH 31.6   MCHC 35.1 "   RDW 12.4   Platelets 222   Neutrophil Rel % 61.3   Immature Granulocyte Rel % 0.7 (A)   Lymphocyte Rel % 28.9   Monocyte Rel % 7.5   Eosinophil Rel % 1.0   Basophil Rel % 0.6   (A) Abnormal value             Lab Results   Component Value Date    TSH 1.880 02/11/2019      Lab Results   Component Value Date    FREET4 1.1 02/11/2019      No results found for: DDIMERQUANT  No results found for: MG   No results found for: DIGOXIN   No results found for: TROPONINT        2/11/2019 lipid panel  Triglycerides 165  Total cholesterol 159  HDL 40  LDL 86      10/3/2019 echo  1.  Normal left ventricular systolic function with EF of 55%.  2.  No significant valvular abnormalities.        Assessment and Plan    Diagnoses and all orders for this visit:    1. Paroxysmal atrial fibrillation (Primary)  Symptomatically stable at this time, EKG in office today shows sinus rhythm with rate of 63 bpm, continue metoprolol 100 mg daily.  Continue Eliquis for CVA prevention.    2. Essential hypertension  Elevated in office today but he reports blood pressures ranging between 119-124/60-70 at home.  Continue to monitor.  Continue metoprolol 100 mg daily.  Advised patient to notify office of any blood pressures that are out of range.    3. Hyperlipidemia LDL goal <100  Last lipid panel was 2/11/2019 with LDL of 86 which is within his goal range, continue atorvastatin 20 mg nightly and obtain new fasting lipid and hepatic function panels.  -     Lipid Panel; Future  -     Hepatic Function Panel; Future    Other orders  -     Eliquis 5 MG tablet tablet; Take 1 tablet by mouth 2 (Two) Times a Day.  Dispense: 60 tablet; Refill: 11  -     ECG 12 Lead        Follow Up   Return in about 6 months (around 6/1/2022) for Follow up with Dr Castellano.    Patient was given instructions and counseling regarding his condition or for health maintenance advice. Please see specific information pulled into the AVS if appropriate.     Vance Lorenzo  reports  that he has never smoked. He has never used smokeless tobacco.           Samia Veliz, APRN  12/01/21  14:09 EST    Dictated Utilizing Dragon Dictation

## 2021-12-14 RX ORDER — ATORVASTATIN CALCIUM 20 MG/1
TABLET, FILM COATED ORAL
Qty: 90 TABLET | Refills: 3 | Status: SHIPPED | OUTPATIENT
Start: 2021-12-14 | End: 2022-02-08

## 2022-01-10 ENCOUNTER — TELEPHONE (OUTPATIENT)
Dept: CARDIOLOGY | Facility: CLINIC | Age: 37
End: 2022-01-10

## 2022-01-10 NOTE — TELEPHONE ENCOUNTER
Received VM from patient.      SW patient. Patient c/o of palpitations and heart racing. Confirmed patient takes metoprolol 100 mg BID     Advised I would discuss with Dr Castellano and return call with recommendations.

## 2022-01-12 RX ORDER — DILTIAZEM HYDROCHLORIDE 120 MG/1
120 CAPSULE, EXTENDED RELEASE ORAL DAILY
Qty: 90 CAPSULE | Refills: 3 | Status: SHIPPED | OUTPATIENT
Start: 2022-01-12 | End: 2022-01-26 | Stop reason: SINTOL

## 2022-01-15 ENCOUNTER — HOSPITAL ENCOUNTER (EMERGENCY)
Facility: HOSPITAL | Age: 37
Discharge: HOME | End: 2022-01-15
Attending: EMERGENCY MEDICINE | Admitting: EMERGENCY MEDICINE

## 2022-01-15 ENCOUNTER — APPOINTMENT (OUTPATIENT)
Dept: GENERAL RADIOLOGY | Facility: HOSPITAL | Age: 37
End: 2022-01-15

## 2022-01-15 VITALS
WEIGHT: 315 LBS | HEART RATE: 68 BPM | HEIGHT: 77 IN | SYSTOLIC BLOOD PRESSURE: 124 MMHG | DIASTOLIC BLOOD PRESSURE: 87 MMHG | OXYGEN SATURATION: 100 % | BODY MASS INDEX: 37.19 KG/M2 | RESPIRATION RATE: 17 BRPM | TEMPERATURE: 98.4 F

## 2022-01-15 DIAGNOSIS — R07.9 CHEST PAIN, UNSPECIFIED TYPE: Primary | ICD-10-CM

## 2022-01-15 LAB
ALBUMIN SERPL-MCNC: 4.8 G/DL (ref 3.5–5.2)
ALBUMIN/GLOB SERPL: 1.7 G/DL
ALP SERPL-CCNC: 74 U/L (ref 39–117)
ALT SERPL W P-5'-P-CCNC: 35 U/L (ref 1–41)
ANION GAP SERPL CALCULATED.3IONS-SCNC: 13.6 MMOL/L (ref 5–15)
AST SERPL-CCNC: 23 U/L (ref 1–40)
BASOPHILS # BLD AUTO: 0.04 10*3/MM3 (ref 0–0.2)
BASOPHILS NFR BLD AUTO: 0.5 % (ref 0–1.5)
BILIRUB SERPL-MCNC: 1.1 MG/DL (ref 0–1.2)
BUN SERPL-MCNC: 8 MG/DL (ref 6–20)
BUN/CREAT SERPL: 9.8 (ref 7–25)
CALCIUM SPEC-SCNC: 9.8 MG/DL (ref 8.6–10.5)
CHLORIDE SERPL-SCNC: 101 MMOL/L (ref 98–107)
CK MB SERPL-CCNC: 2.68 NG/ML
CK SERPL-CCNC: 135 U/L (ref 20–200)
CO2 SERPL-SCNC: 24.4 MMOL/L (ref 22–29)
CREAT SERPL-MCNC: 0.82 MG/DL (ref 0.76–1.27)
DEPRECATED RDW RBC AUTO: 38.7 FL (ref 37–54)
EOSINOPHIL # BLD AUTO: 0.08 10*3/MM3 (ref 0–0.4)
EOSINOPHIL NFR BLD AUTO: 1 % (ref 0.3–6.2)
ERYTHROCYTE [DISTWIDTH] IN BLOOD BY AUTOMATED COUNT: 12.1 % (ref 12.3–15.4)
GFR SERPL CREATININE-BSD FRML MDRD: 108 ML/MIN/1.73
GLOBULIN UR ELPH-MCNC: 2.9 GM/DL
GLUCOSE SERPL-MCNC: 105 MG/DL (ref 65–99)
HCT VFR BLD AUTO: 47.1 % (ref 37.5–51)
HGB BLD-MCNC: 17.1 G/DL (ref 13–17.7)
HOLD SPECIMEN: NORMAL
IMM GRANULOCYTES # BLD AUTO: 0.01 10*3/MM3 (ref 0–0.05)
IMM GRANULOCYTES NFR BLD AUTO: 0.1 % (ref 0–0.5)
LIPASE SERPL-CCNC: 41 U/L (ref 13–60)
LYMPHOCYTES # BLD AUTO: 3.18 10*3/MM3 (ref 0.7–3.1)
LYMPHOCYTES NFR BLD AUTO: 40.2 % (ref 19.6–45.3)
MAGNESIUM SERPL-MCNC: 2 MG/DL (ref 1.6–2.6)
MCH RBC QN AUTO: 31.8 PG (ref 26.6–33)
MCHC RBC AUTO-ENTMCNC: 36.3 G/DL (ref 31.5–35.7)
MCV RBC AUTO: 87.7 FL (ref 79–97)
MONOCYTES # BLD AUTO: 0.51 10*3/MM3 (ref 0.1–0.9)
MONOCYTES NFR BLD AUTO: 6.4 % (ref 5–12)
NEUTROPHILS NFR BLD AUTO: 4.1 10*3/MM3 (ref 1.7–7)
NEUTROPHILS NFR BLD AUTO: 51.8 % (ref 42.7–76)
NRBC BLD AUTO-RTO: 0 /100 WBC (ref 0–0.2)
NT-PROBNP SERPL-MCNC: 53.4 PG/ML (ref 0–450)
PLATELET # BLD AUTO: 267 10*3/MM3 (ref 140–450)
PMV BLD AUTO: 12 FL (ref 6–12)
POTASSIUM SERPL-SCNC: 3.9 MMOL/L (ref 3.5–5.2)
PROT SERPL-MCNC: 7.7 G/DL (ref 6–8.5)
RBC # BLD AUTO: 5.37 10*6/MM3 (ref 4.14–5.8)
SODIUM SERPL-SCNC: 139 MMOL/L (ref 136–145)
TROPONIN I SERPL-MCNC: 0 NG/ML (ref 0–0.6)
TROPONIN I SERPL-MCNC: 0 NG/ML (ref 0–0.6)
WBC NRBC COR # BLD: 7.92 10*3/MM3 (ref 3.4–10.8)
WHOLE BLOOD HOLD SPECIMEN: NORMAL
WHOLE BLOOD HOLD SPECIMEN: NORMAL

## 2022-01-15 PROCEDURE — 83880 ASSAY OF NATRIURETIC PEPTIDE: CPT | Performed by: EMERGENCY MEDICINE

## 2022-01-15 PROCEDURE — 84484 ASSAY OF TROPONIN QUANT: CPT

## 2022-01-15 PROCEDURE — 93010 ELECTROCARDIOGRAM REPORT: CPT | Performed by: INTERNAL MEDICINE

## 2022-01-15 PROCEDURE — 83690 ASSAY OF LIPASE: CPT | Performed by: EMERGENCY MEDICINE

## 2022-01-15 PROCEDURE — 93005 ELECTROCARDIOGRAM TRACING: CPT

## 2022-01-15 PROCEDURE — 80053 COMPREHEN METABOLIC PANEL: CPT | Performed by: EMERGENCY MEDICINE

## 2022-01-15 PROCEDURE — 85025 COMPLETE CBC W/AUTO DIFF WBC: CPT

## 2022-01-15 PROCEDURE — 82553 CREATINE MB FRACTION: CPT | Performed by: EMERGENCY MEDICINE

## 2022-01-15 PROCEDURE — 36415 COLL VENOUS BLD VENIPUNCTURE: CPT

## 2022-01-15 PROCEDURE — 82550 ASSAY OF CK (CPK): CPT | Performed by: EMERGENCY MEDICINE

## 2022-01-15 PROCEDURE — 71045 X-RAY EXAM CHEST 1 VIEW: CPT

## 2022-01-15 PROCEDURE — 83735 ASSAY OF MAGNESIUM: CPT | Performed by: EMERGENCY MEDICINE

## 2022-01-15 PROCEDURE — 99283 EMERGENCY DEPT VISIT LOW MDM: CPT

## 2022-01-15 PROCEDURE — 93005 ELECTROCARDIOGRAM TRACING: CPT | Performed by: EMERGENCY MEDICINE

## 2022-01-15 RX ORDER — ASPIRIN 81 MG/1
324 TABLET, CHEWABLE ORAL ONCE
Status: DISCONTINUED | OUTPATIENT
Start: 2022-01-15 | End: 2022-01-15 | Stop reason: HOSPADM

## 2022-01-15 RX ORDER — SODIUM CHLORIDE 0.9 % (FLUSH) 0.9 %
10 SYRINGE (ML) INJECTION AS NEEDED
Status: DISCONTINUED | OUTPATIENT
Start: 2022-01-15 | End: 2022-01-15 | Stop reason: HOSPADM

## 2022-01-15 NOTE — ED PROVIDER NOTES
Time: 5:40 PM EST  Arrived by: private car; accompanied by family   Chief Complaint: CP  History provided by: pt  History is limited by: N/A     History of Present Illness:  Patient is a 36 y.o. year old male that presents to the emergency department with CP described as heaviness to the left chest. This started two week ago and worsened today. It is moderate in severity. Nothing improves or worsens symptoms.     Pt has cough and c/o SOB that is worse with supine position.     Pt has hx of A-fib and HTN. No known hx of CAD. He has not had a stress test before. Pt is not vaccinated for COVID-19. He had COVID-19 last year.     History provided by:  Patient  Chest Pain  Pain location:  L chest  Pain quality comment:  Heaviness  Pain radiates to:  Does not radiate  Pain severity:  Moderate  Onset quality:  Gradual  Duration:  2 weeks  Timing:  Constant  Progression:  Worsening  Chronicity:  New  Relieved by:  Nothing  Worsened by:  Nothing  Associated symptoms: cough and shortness of breath    Associated symptoms: no back pain, no diaphoresis, no fever, no headache, no nausea and no vomiting        Similar Symptoms Previously: no  Recently seen: Pt was seen in this ED on 10/27/21 for flank pain and constipation.     Patient Care Team  Primary Care Provider: Diaz Nair MD    Past Medical History:     No Known Allergies  Past Medical History:   Diagnosis Date   • Essential hypertension 12/1/2021   • Foot pain, bilateral 08/17/2018   • Hyperlipidemia LDL goal <100 12/1/2021   • Paroxysmal atrial fibrillation 12/1/2021   • Tinnitus    • Xerosis of skin 08/17/2018     No past surgical history on file.  Family History   Problem Relation Age of Onset   • Breast cancer Maternal Grandmother         50s   • Cancer Paternal Grandmother    • Cancer Paternal Grandfather        Home Medications:  Prior to Admission medications    Medication Sig Start Date End Date Taking? Authorizing Provider   acetaminophen (TYLENOL) 500 MG tablet  "Take 500 mg by mouth.    Provider, MD Roe   atorvastatin (LIPITOR) 20 MG tablet TAKE ONE TABLET BY MOUTH AT BEDTIME 12/14/21   Damien Castellano MD   dilTIAZem XR (DILACOR XR) 120 MG 24 hr capsule Take 1 capsule by mouth Daily. 1/12/22   Damien Castellano MD   Eliquis 5 MG tablet tablet Take 1 tablet by mouth 2 (Two) Times a Day. 12/1/21   Samia Veliz APRN   metoprolol succinate XL (TOPROL-XL) 100 MG 24 hr tablet TAKE ONE TABLET BY MOUTH TWICE DAILY 7/8/21   Samia Veliz APRN        Social History:   Social History     Tobacco Use   • Smoking status: Never Smoker   • Smokeless tobacco: Never Used   Vaping Use   • Vaping Use: Never used   Substance Use Topics   • Alcohol use: Never   • Drug use: Never     Recent travel: no     Review of Systems:  Review of Systems   Constitutional: Negative for chills, diaphoresis and fever.   HENT: Negative for ear discharge and nosebleeds.    Eyes: Negative for photophobia.   Respiratory: Positive for cough and shortness of breath.    Cardiovascular: Positive for chest pain.   Gastrointestinal: Negative for diarrhea, nausea and vomiting.   Genitourinary: Negative for dysuria.   Musculoskeletal: Negative for back pain and neck pain.   Skin: Negative for rash.   Neurological: Negative for headaches.        Physical Exam:  /84   Pulse 71   Temp 98.4 °F (36.9 °C) (Oral)   Resp 18   Ht 195.6 cm (77\")   Wt (!) 157 kg (345 lb 3.9 oz)   SpO2 98%   BMI 40.94 kg/m²     Physical Exam  Vitals and nursing note reviewed.   Constitutional:       General: He is not in acute distress.     Appearance: Normal appearance. He is not toxic-appearing.   HENT:      Head: Normocephalic and atraumatic.      Jaw: There is normal jaw occlusion.   Eyes:      General: Lids are normal.      Extraocular Movements: Extraocular movements intact.      Conjunctiva/sclera: Conjunctivae normal.      Pupils: Pupils are equal, round, and reactive to light.   Cardiovascular:      Rate and " Rhythm: Normal rate and regular rhythm.      Pulses: Normal pulses.      Heart sounds: Normal heart sounds.   Pulmonary:      Effort: Pulmonary effort is normal. No respiratory distress.      Breath sounds: Normal breath sounds. No wheezing or rhonchi.   Abdominal:      General: Abdomen is flat.      Palpations: Abdomen is soft.      Tenderness: There is no abdominal tenderness. There is no guarding or rebound.   Musculoskeletal:         General: Normal range of motion.      Cervical back: Normal range of motion and neck supple.      Right lower leg: No edema.      Left lower leg: No edema.   Skin:     General: Skin is warm and dry.   Neurological:      Mental Status: He is alert and oriented to person, place, and time. Mental status is at baseline.   Psychiatric:         Mood and Affect: Mood normal.                Medications in the Emergency Department:  Medications   sodium chloride 0.9 % flush 10 mL (has no administration in time range)   aspirin chewable tablet 324 mg (324 mg Oral Not Given 1/15/22 1731)        Labs  Lab Results (last 24 hours)     Procedure Component Value Units Date/Time    POC Troponin I with Hold Tube [[id number]] Collected: 01/15/22 1656    Specimen: Blood Updated: 01/15/22 1739    Narrative:      The following orders were created for panel order POC Troponin I with Hold Tube.  Procedure                               Abnormality         Status                     ---------                               -----------         ------                     POC Troponin I[[id number]]                                                              HOLD Troponin-I Tube[[id number]]                             Final result                 Please view results for these tests on the individual orders.    CBC & Differential [[id number]]  (Abnormal) Collected: 01/15/22 1656    Specimen: Blood Updated: 01/15/22 1731    Narrative:      The following orders were created for panel order CBC &  Differential.  Procedure                               Abnormality         Status                     ---------                               -----------         ------                     CBC Auto Differential[[id number]]        Abnormal            Final result                 Please view results for these tests on the individual orders.    Comprehensive Metabolic Panel [[id number]]  (Abnormal) Collected: 01/15/22 1656    Specimen: Blood Updated: 01/15/22 1802     Glucose 105 mg/dL      BUN 8 mg/dL      Creatinine 0.82 mg/dL      Sodium 139 mmol/L      Potassium 3.9 mmol/L      Chloride 101 mmol/L      CO2 24.4 mmol/L      Calcium 9.8 mg/dL      Total Protein 7.7 g/dL      Albumin 4.80 g/dL      ALT (SGPT) 35 U/L      AST (SGOT) 23 U/L      Alkaline Phosphatase 74 U/L      Total Bilirubin 1.1 mg/dL      eGFR Non African Amer 108 mL/min/1.73      Globulin 2.9 gm/dL      A/G Ratio 1.7 g/dL      BUN/Creatinine Ratio 9.8     Anion Gap 13.6 mmol/L     Narrative:      GFR Normal >60  Chronic Kidney Disease <60  Kidney Failure <15      Lipase [[id number]]  (Normal) Collected: 01/15/22 1656    Specimen: Blood Updated: 01/15/22 1802     Lipase 41 U/L     BNP [[id number]]  (Normal) Collected: 01/15/22 1656    Specimen: Blood Updated: 01/15/22 1756     proBNP 53.4 pg/mL     Narrative:      Among patients with dyspnea, NT-proBNP is highly sensitive for the detection of acute congestive heart failure. In addition NT-proBNP of <300 pg/ml effectively rules out acute congestive heart failure with 99% negative predictive value.    Results may be falsely decreased if patient taking Biotin.      Magnesium [[id number]]  (Normal) Collected: 01/15/22 1656    Specimen: Blood Updated: 01/15/22 1802     Magnesium 2.0 mg/dL     CK Total & CKMB [[id number]]  (Normal) Collected: 01/15/22 1656    Specimen: Blood Updated: 01/15/22 1802     CKMB 2.68 ng/mL      Creatine Kinase 135 U/L     Narrative:      CKMB results may be falsely decreased if  patient taking Biotin.    CBC Auto Differential [[id number]]  (Abnormal) Collected: 01/15/22 1656    Specimen: Blood Updated: 01/15/22 1731     WBC 7.92 10*3/mm3      RBC 5.37 10*6/mm3      Hemoglobin 17.1 g/dL      Hematocrit 47.1 %      MCV 87.7 fL      MCH 31.8 pg      MCHC 36.3 g/dL      RDW 12.1 %      RDW-SD 38.7 fl      MPV 12.0 fL      Platelets 267 10*3/mm3      Neutrophil % 51.8 %      Lymphocyte % 40.2 %      Monocyte % 6.4 %      Eosinophil % 1.0 %      Basophil % 0.5 %      Immature Grans % 0.1 %      Neutrophils, Absolute 4.10 10*3/mm3      Lymphocytes, Absolute 3.18 10*3/mm3      Monocytes, Absolute 0.51 10*3/mm3      Eosinophils, Absolute 0.08 10*3/mm3      Basophils, Absolute 0.04 10*3/mm3      Immature Grans, Absolute 0.01 10*3/mm3      nRBC 0.0 /100 WBC     POC Troponin I [[id number]]  (Normal) Collected: 01/15/22 1659    Specimen: Blood Updated: 01/15/22 1712     Troponin I 0.00 ng/mL      Comment: Serial Number: [id number]:  [id number]       POC Troponin I [[id number]]  (Normal) Collected: 01/15/22 2019    Specimen: Blood Updated: 01/15/22 2031     Troponin I 0.00 ng/mL      Comment: Serial Number: [id number]:  [id number]       POC Troponin I with Hold Tube [[id number]] Collected: 01/15/22 2026    Specimen: Blood Updated: 01/15/22 2035    Narrative:      The following orders were created for panel order POC Troponin I with Hold Tube.  Procedure                               Abnormality         Status                     ---------                               -----------         ------                     POC Troponin I[[id number]]                                                              HOLD Troponin-I Tube[[id number]]                             In process                   Please view results for these tests on the individual orders.           Imaging:  XR Chest 1 View    Result Date: 1/15/2022  PROCEDURE: XR CHEST 1 VW  COMPARISON: [facility], CR, CXR PORTABLE, 1/19/2018, 20:59.   INDICATIONS: Chest Pain triage protocol  FINDINGS:   The lungs are well-expanded. The heart and pulmonary vasculature are within normal limits. No pleural effusions are identified. There are no active appearing infiltrates.  No evidence of pneumothorax.  IMPRESSION: No active disease.  TIARRA RICHTER MD       Electronically Signed and Approved By: TIARRA RICHTER MD on 1/15/2022 at 18:08               Procedures:  Procedures    Progress     EKG:    Rhythm: sinus  Rate: 63  Axis: normal  Intervals: normal intervals  ST Segment: no elevations    EKG Comparison: unchanged    Interpreted by me                         Medical Decision Making:  MDM  Number of Diagnoses or Management Options  Chest pain, unspecified type  Diagnosis management comments: The patient had an EKG that shows no acute changes. Specifically, there are no ST elevations, t-wave changes of concern, delta waves, or rhythm abnormalities warranting admission. The patient was placed on the cardiac monitor and observed with continuous telemetry. The patient has a chest x-ray that is negative for pneumothorax, pneumonia, and is essentially unremarkable. The patient has had unelevated troponins on blood draw.      The patient is resting comfortably and feels better, is alert and in no distress.  The patient´s CBC was reviewed and shows no abnormalities of critical concern. Of note, there is no anemia requiring a blood transfusion and the platelet count is acceptable.  The patient´s CMP was reviewed and shows no abnormalities of critical concern. Of note, the patient´s sodium and potassium are acceptable. The patient´s liver enzymes are unremarkable. The patient´s renal function (creatinine) is preserved. The patient has a normal anion gap.  Troponin x2 are negative.  The repeat examination is unremarkable and benign. Electrocardiogram shows no signs of acute ischemia and the history, exam, diagnostic testing and current condition did not suggest that  this patient is having an acute myocardial infarction, significant arrhythmia, unstable angina, esophageal perforation, pulmonary embolism, aortic dissection, severe pneumonia, sepsis for other significant pathology that would warrant further testing, continued ED treatment, admission, cardiology or other specialist consultation at this point.  Patient was discussed with Dr. Castellano who agrees that the patient can be discharged with close follow-up this week.  The vital signs have been stable. The patient's condition is stable and appropriate for discharge. The patient will pursue further outpatient evaluation with the primary care physician, or designated physician or cardiologist. The patient has expressed a clear and thorough understanding and agreed to follow-up as instructed.       Amount and/or Complexity of Data Reviewed  Clinical lab tests: reviewed  Tests in the radiology section of CPT®: reviewed  Tests in the medicine section of CPT®: reviewed  Discussion of test results with the performing providers: yes  Discuss the patient with other providers: yes  Independent visualization of images, tracings, or specimens: yes    Risk of Complications, Morbidity, and/or Mortality  Presenting problems: moderate  Management options: moderate    Patient Progress  Patient progress: stable       Final diagnoses:   Chest pain, unspecified type        Disposition:  ED Disposition     None            Documentation assistance provided by Yamel Obrien acting as scribe for Magi Fontenot MD. Information recorded by the scribe was done at my direction and has been verified and validated by me.        Yamel Obrien  01/15/22 [id number]       Magi Fontenot MD  01/15/22 [id number]

## 2022-01-24 LAB
QT INTERVAL: 388 MS
QT INTERVAL: 403 MS

## 2022-01-25 NOTE — PATIENT INSTRUCTIONS
"Low-Sodium Eating Plan  Sodium, which is an element that makes up salt, helps you maintain a healthy balance of fluids in your body. Too much sodium can increase your blood pressure and cause fluid and waste to be held in your body.  Your health care provider or dietitian may recommend following this plan if you have high blood pressure (hypertension), kidney disease, liver disease, or heart failure. Eating less sodium can help lower your blood pressure, reduce swelling, and protect your heart, liver, and kidneys.  What are tips for following this plan?  Reading food labels  · The Nutrition Facts label lists the amount of sodium in one serving of the food. If you eat more than one serving, you must multiply the listed amount of sodium by the number of servings.  · Choose foods with less than 140 mg of sodium per serving.  · Avoid foods with 300 mg of sodium or more per serving.  Shopping    · Look for lower-sodium products, often labeled as \"low-sodium\" or \"no salt added.\"  · Always check the sodium content, even if foods are labeled as \"unsalted\" or \"no salt added.\"  · Buy fresh foods.  ? Avoid canned foods and pre-made or frozen meals.  ? Avoid canned, cured, or processed meats.  · Buy breads that have less than 80 mg of sodium per slice.    Cooking    · Eat more home-cooked food and less restaurant, buffet, and fast food.  · Avoid adding salt when cooking. Use salt-free seasonings or herbs instead of table salt or sea salt. Check with your health care provider or pharmacist before using salt substitutes.  · Cook with plant-based oils, such as canola, sunflower, or olive oil.    Meal planning  · When eating at a restaurant, ask that your food be prepared with less salt or no salt, if possible. Avoid dishes labeled as brined, pickled, cured, smoked, or made with soy sauce, miso, or teriyaki sauce.  · Avoid foods that contain MSG (monosodium glutamate). MSG is sometimes added to Chinese food, bouillon, and some " "canned foods.  · Make meals that can be grilled, baked, poached, roasted, or steamed. These are generally made with less sodium.  General information  Most people on this plan should limit their sodium intake to 1,500-2,000 mg (milligrams) of sodium each day.  What foods should I eat?  Fruits  Fresh, frozen, or canned fruit. Fruit juice.  Vegetables  Fresh or frozen vegetables. \"No salt added\" canned vegetables. \"No salt added\" tomato sauce and paste. Low-sodium or reduced-sodium tomato and vegetable juice.  Grains  Low-sodium cereals, including oats, puffed wheat and rice, and shredded wheat. Low-sodium crackers. Unsalted rice. Unsalted pasta. Low-sodium bread. Whole-grain breads and whole-grain pasta.  Meats and other proteins  Fresh or frozen (no salt added) meat, poultry, seafood, and fish. Low-sodium canned tuna and salmon. Unsalted nuts. Dried peas, beans, and lentils without added salt. Unsalted canned beans. Eggs. Unsalted nut butters.  Dairy  Milk. Soy milk. Cheese that is naturally low in sodium, such as ricotta cheese, fresh mozzarella, or Swiss cheese. Low-sodium or reduced-sodium cheese. Cream cheese. Yogurt.  Seasonings and condiments  Fresh and dried herbs and spices. Salt-free seasonings. Low-sodium mustard and ketchup. Sodium-free salad dressing. Sodium-free light mayonnaise. Fresh or refrigerated horseradish. Lemon juice. Vinegar.  Other foods  Homemade, reduced-sodium, or low-sodium soups. Unsalted popcorn and pretzels. Low-salt or salt-free chips.  The items listed above may not be a complete list of foods and beverages you can eat. Contact a dietitian for more information.  What foods should I avoid?  Vegetables  Sauerkraut, pickled vegetables, and relishes. Olives. French fries. Onion rings. Regular canned vegetables (not low-sodium or reduced-sodium). Regular canned tomato sauce and paste (not low-sodium or reduced-sodium). Regular tomato and vegetable juice (not low-sodium or reduced-sodium). " Frozen vegetables in sauces.  Grains  Instant hot cereals. Bread stuffing, pancake, and biscuit mixes. Croutons. Seasoned rice or pasta mixes. Noodle soup cups. Boxed or frozen macaroni and cheese. Regular salted crackers. Self-rising flour.  Meats and other proteins  Meat or fish that is salted, canned, smoked, spiced, or pickled. Precooked or cured meat, such as sausages or meat loaves. Bowesr. Ham. Pepperoni. Hot dogs. Corned beef. Chipped beef. Salt pork. Jerky. Pickled herring. Anchovies and sardines. Regular canned tuna. Salted nuts.  Dairy  Processed cheese and cheese spreads. Hard cheeses. Cheese curds. Blue cheese. Feta cheese. String cheese. Regular cottage cheese. Buttermilk. Canned milk.  Fats and oils  Salted butter. Regular margarine. Ghee. Bowers fat.  Seasonings and condiments  Onion salt, garlic salt, seasoned salt, table salt, and sea salt. Canned and packaged gravies. Worcestershire sauce. Tartar sauce. Barbecue sauce. Teriyaki sauce. Soy sauce, including reduced-sodium. Steak sauce. Fish sauce. Oyster sauce. Cocktail sauce. Horseradish that you find on the shelf. Regular ketchup and mustard. Meat flavorings and tenderizers. Bouillon cubes. Hot sauce. Pre-made or packaged marinades. Pre-made or packaged taco seasonings. Relishes. Regular salad dressings. Salsa.  Other foods  Salted popcorn and pretzels. Corn chips and puffs. Potato and tortilla chips. Canned or dried soups. Pizza. Frozen entrees and pot pies.  The items listed above may not be a complete list of foods and beverages you should avoid. Contact a dietitian for more information.  Summary  · Eating less sodium can help lower your blood pressure, reduce swelling, and protect your heart, liver, and kidneys.  · Most people on this plan should limit their sodium intake to 1,500-2,000 mg (milligrams) of sodium each day.  · Canned, boxed, and frozen foods are high in sodium. Restaurant foods, fast foods, and pizza are also very high in sodium.  You also get sodium by adding salt to food.  · Try to cook at home, eat more fresh fruits and vegetables, and eat less fast food and canned, processed, or prepared foods.  This information is not intended to replace advice given to you by your health care provider. Make sure you discuss any questions you have with your health care provider.  Document Revised: 01/22/2021 Document Reviewed: 11/18/2020  [employer] Patient Education © 2021 [employer]      Cholesterol Content in Foods  Cholesterol is a waxy, fat-like substance that helps to carry fat in the blood. The body needs cholesterol in small amounts, but too much cholesterol can cause damage to the arteries and heart.  Most people should eat less than 200 milligrams (mg) of cholesterol a day.  Foods with cholesterol    Cholesterol is found in animal-based foods, such as meat, seafood, and dairy. Generally, low-fat dairy and lean meats have less cholesterol than full-fat dairy and fatty meats. The milligrams of cholesterol per serving (mg per serving) of common cholesterol-containing foods are listed below.  Meat and other proteins  · Egg -- one large whole egg has 186 mg.  · Veal shank -- 4 oz has 141 mg.  · Lean ground turkey (93% lean) -- 4 oz has 118 mg.  · Fat-trimmed lamb loin -- 4 oz has 106 mg.  · Lean ground beef (90% lean) -- 4 oz has 100 mg.  · Lobster -- 3.5 oz has 90 mg.  · Pork loin chops -- 4 oz has 86 mg.  · Canned salmon -- 3.5 oz has 83 mg.  · Fat-trimmed beef top loin -- 4 oz has 78 mg.  · Frankfurter -- 1 saskia (3.5 oz) has 77 mg.  · Crab -- 3.5 oz has 71 mg.  · Roasted chicken without skin, white meat -- 4 oz has 66 mg.  · Light bologna -- 2 oz has 45 mg.  · Deli-cut turkey -- 2 oz has 31 mg.  · Canned tuna -- 3.5 oz has 31 mg.  · Bowers -- 1 oz has 29 mg.  · Oysters and mussels (raw) -- 3.5 oz has 25 mg.  · Mackerel -- 1 oz has 22 mg.  · Trout -- 1 oz has 20 mg.  · Pork sausage -- 1 link (1 oz) has 17 mg.  · [place] -- 1 oz has 16  mg.  · Tilapia -- 1 oz has 14 mg.  Dairy  · Soft-serve ice cream -- ½ cup (4 oz) has 103 mg.  · Whole-milk yogurt -- 1 cup (8 oz) has 29 mg.  · Cheddar cheese -- 1 oz has 28 mg.  · American cheese -- 1 oz has 28 mg.  · Whole milk -- 1 cup (8 oz) has 23 mg.  · 2% milk -- 1 cup (8 oz) has 18 mg.  · Cream cheese -- 1 tablespoon (Tbsp) has 15 mg.  · Cottage cheese -- ½ cup (4 oz) has 14 mg.  · Low-fat (1%) milk -- 1 cup (8 oz) has 10 mg.  · Sour cream -- 1 Tbsp has 8.5 mg.  · Low-fat yogurt -- 1 cup (8 oz) has 8 mg.  · Nonfat Greek yogurt -- 1 cup (8 oz) has 7 mg.  · Half-and-half cream -- 1 Tbsp has 5 mg.  Fats and oils  · Cod liver oil -- 1 tablespoon (Tbsp) has 82 mg.  · Butter -- 1 Tbsp has 15 mg.  · Lard -- 1 Tbsp has 14 mg.  · Bowers grease -- 1 Tbsp has 14 mg.  · Mayonnaise -- 1 Tbsp has 5-10 mg.  · Margarine -- 1 Tbsp has 3-10 mg.  Exact amounts of cholesterol in these foods may vary depending on specific ingredients and brands.  Foods without cholesterol  Most plant-based foods do not have cholesterol unless you combine them with a food that has cholesterol. Foods without cholesterol include:  · Grains and cereals.  · Vegetables.  · Fruits.  · Vegetable oils, such as olive, canola, and sunflower oil.  · Legumes, such as peas, beans, and lentils.  · Nuts and seeds.  · Egg whites.  Summary  · The body needs cholesterol in small amounts, but too much cholesterol can cause damage to the arteries and heart.  · Most people should eat less than 200 milligrams (mg) of cholesterol a day.  This information is not intended to replace advice given to you by your health care provider. Make sure you discuss any questions you have with your health care provider.  Document Revised: 05/10/2021 Document Reviewed: 05/10/2021  [employer] Patient Education © 2021 [employer]

## 2022-01-26 ENCOUNTER — OFFICE VISIT (OUTPATIENT)
Dept: CARDIOLOGY | Facility: CLINIC | Age: 37
End: 2022-01-26

## 2022-01-26 VITALS
WEIGHT: 315 LBS | DIASTOLIC BLOOD PRESSURE: 80 MMHG | HEART RATE: 63 BPM | BODY MASS INDEX: 37.19 KG/M2 | HEIGHT: 77 IN | SYSTOLIC BLOOD PRESSURE: 135 MMHG

## 2022-01-26 DIAGNOSIS — I48.0 PAROXYSMAL ATRIAL FIBRILLATION: ICD-10-CM

## 2022-01-26 DIAGNOSIS — R07.89 ATYPICAL CHEST PAIN: Primary | ICD-10-CM

## 2022-01-26 PROCEDURE — 99213 OFFICE O/P EST LOW 20 MIN: CPT | Performed by: NURSE PRACTITIONER

## 2022-01-26 PROCEDURE — 93000 ELECTROCARDIOGRAM COMPLETE: CPT | Performed by: INTERNAL MEDICINE

## 2022-01-26 RX ORDER — FLECAINIDE ACETATE 50 MG/1
50 TABLET ORAL 2 TIMES DAILY
Qty: 30 TABLET | Refills: 1 | Status: SHIPPED | OUTPATIENT
Start: 2022-01-26 | End: 2022-02-22

## 2022-01-26 RX ORDER — PREDNISOLONE ACETATE 10 MG/ML
SUSPENSION/ DROPS OPHTHALMIC
COMMUNITY
Start: 2022-01-21 | End: 2022-08-01

## 2022-01-26 NOTE — PROGRESS NOTES
Chief Complaint  Atrial Fibrillation (recent ER visit), Shortness of Breath (at times), and sore chest and heaviness (on more than one occassion)    Subjective            History of Present Illness  Vance Lorenzo is a 36-year-old white/ male patient who presents to the office today for ER follow-up.  He was seen at [facility] ER on 1/15/2022 for complaint of left side chest pain and shortness of breath.  He has paroxysmal atrial fibrillation, hyperlipidemia, and hypertension.  Cardiac work-up was unremarkable.  That episode was after Dr. Castellano had added diltiazem and to the patient's regimen for increased palpitations on 1/10/2022.  Today, he reports that he only took 1 dose of the diltiazem since it made him feel badly with the symptoms of chest pain and shortness of breath.  He reports that he has had episodes of increased palpitations and heart rate intermittently over the past 3 to 4 weeks, the longest episode lasting approximately 3 hours.  He denies any recurrence of chest pain, just some soreness to the touch.  He reports that he only becomes short of breath with strenuous activity.  He denies any lightheadedness/dizziness or edema.  He reports compliance with the rest of his medications.    PMH  Past Medical History:   Diagnosis Date   • Essential hypertension 12/1/2021   • Foot pain, bilateral 08/17/2018   • Hyperlipidemia LDL goal <100 12/1/2021   • Paroxysmal atrial fibrillation 12/1/2021   • Tinnitus    • Xerosis of skin 08/17/2018         ALLERGY  Allergies   Allergen Reactions   • Diltiazem Shortness Of Breath          SURGICALHX  History reviewed. No pertinent surgical history.       SOC  Social History     Socioeconomic History   • Marital status: Other   Tobacco Use   • Smoking status: Never Smoker   • Smokeless tobacco: Never Used   Vaping Use   • Vaping Use: Never used   Substance and Sexual Activity   • Alcohol use: Never   • Drug use: Never   • Sexual activity: Defer  "        FAMHX  Family History   Problem Relation Age of Onset   • Breast cancer Maternal Grandmother         50s   • Cancer Paternal Grandmother    • Cancer Paternal Grandfather    • Heart failure Paternal Grandfather           MEDSIGONLY  Current Outpatient Medications on File Prior to Visit   Medication Sig   • acetaminophen (TYLENOL) 500 MG tablet Take 500 mg by mouth As Needed.   • atorvastatin (LIPITOR) 20 MG tablet TAKE ONE TABLET BY MOUTH AT BEDTIME   • Eliquis 5 MG tablet tablet Take 1 tablet by mouth 2 (Two) Times a Day.   • metoprolol succinate XL (TOPROL-XL) 100 MG 24 hr tablet TAKE ONE TABLET BY MOUTH TWICE DAILY   • prednisoLONE acetate (PRED FORTE) 1 % ophthalmic suspension INSTILL ONE DROP IN EACH EYE FOUR TIMES DAILY FOR 14 DAYS (SHAKE BOTTLE PRIOR TO INSTILLING DROPS)   • [DISCONTINUED] dilTIAZem XR (DILACOR XR) 120 MG 24 hr capsule Take 1 capsule by mouth Daily.     No current facility-administered medications on file prior to visit.         Objective   /80   Pulse 63   Ht 195.6 cm (77\")   Wt (!) 154 kg (340 lb)   BMI 40.32 kg/m²       Physical Exam  Constitutional:       Appearance: He is obese.   HENT:      Head: Normocephalic.   Cardiovascular:      Rate and Rhythm: Normal rate and regular rhythm.      Pulses: Normal pulses.      Heart sounds: Normal heart sounds. No murmur heard.      Pulmonary:      Effort: Pulmonary effort is normal.      Breath sounds: Normal breath sounds.   Musculoskeletal:      Right lower leg: No edema.      Left lower leg: No edema.   Skin:     General: Skin is dry.      Capillary Refill: Capillary refill takes less than 2 seconds.   Neurological:      Mental Status: He is alert and oriented to person, place, and time.   Psychiatric:         Behavior: Behavior normal.       ECG 12 Lead    Date/Time: 1/26/2022 1:32 PM  Performed by: Samia Veliz APRN  Authorized by: Damien Castellano MD   Comparison: compared with previous ECG   Similar to previous " ECG  Rhythm: sinus rhythm  Rate: normal  BPM: 63  Conduction: conduction normal  ST Segments: ST segments normal  T Waves: T waves normal  QRS axis: normal  Other: no other findings    Clinical impression: normal ECG          Result Review :   The following data was reviewed by: MARSHA Crespo on 01/26/2022:  proBNP   Date Value Ref Range Status   01/15/2022 53.4 0.0 - 450.0 pg/mL Final     CMP    CMP 1/15/22   Glucose 105 (A)   BUN 8   Creatinine 0.82   eGFR Non African Am 108   Sodium 139   Potassium 3.9   Chloride 101   Calcium 9.8   Albumin 4.80   Total Bilirubin 1.1   Alkaline Phosphatase 74   AST (SGOT) 23   ALT (SGPT) 35   (A) Abnormal value       Comments are available for some flowsheets but are not being displayed.           CBC w/diff    CBC w/Diff 1/15/22   WBC 7.92   RBC 5.37   Hemoglobin 17.1   Hematocrit 47.1   MCV 87.7   MCH 31.8   MCHC 36.3 (A)   RDW 12.1 (A)   Platelets 267   Neutrophil Rel % 51.8   Immature Granulocyte Rel % 0.1   Lymphocyte Rel % 40.2   Monocyte Rel % 6.4   Eosinophil Rel % 1.0   Basophil Rel % 0.5   (A) Abnormal value             Lab Results   Component Value Date    TSH 1.880 02/11/2019      Lab Results   Component Value Date    FREET4 1.1 02/11/2019      No results found for: DDIMERQUANT  Magnesium   Date Value Ref Range Status   01/15/2022 2.0 1.6 - 2.6 mg/dL Final      No results found for: DIGOXIN   No results found for: TROPONINT        02/11/19 lipid panel  Triglycerides   40 - 150 mg/dL 165 High     Total Cholesterol   107 - 200 mg/dL 159    HDL Cholesterol   40 - 60 mg/dL 40    LDL Cholesterol    70 - 100 mg/dL 86    VLDL Cholesterol   5 - 37 mg/dL 33      10/03/2019 echo  1.  Normal left ventricular systolic function with EF of 55%.  2.  No significant valvular abnormalities.       Assessment and Plan    Diagnoses and all orders for this visit:    1. Atypical chest pain (Primary)  Since there is a chest pain component and will be starting the patient on  antidysrhythmic medication, obtain nuclear stress test to rule out any acute ischemia.  -     Stress Test With Myocardial Perfusion One Day; Future    2. Paroxysmal atrial fibrillation  Start flecainide 50 mg twice daily on Friday and return to office on Monday for EKG only visit.  EKG in office today shows sinus rhythm with rate of 63 bpm.  Continue metoprolol 100 mg twice daily.  Continue Eliquis for CVA prevention.  -     ECG 12 Lead; Future    Options such as cardioversion and/or ablation procedures were discussed with the patient today but since his atrial fibrillation is not persistent then medical therapy will be utilized for now.    Other orders  -     flecainide (TAMBOCOR) 50 MG tablet; Take 1 tablet by mouth 2 (Two) Times a Day.  Dispense: 30 tablet; Refill: 1  -     ECG 12 Lead      Follow Up   Return for Needs EKG only visit for Monday. Keep June follow up with Dr Castellano.    Patient was given instructions and counseling regarding his condition or for health maintenance advice. Please see specific information pulled into the AVS if appropriate.     Vance Lorenzo  reports that he has never smoked. He has never used smokeless tobacco.           Samia Veliz, MARSHA  01/26/22  11:52 EST    Dictated Utilizing Dragon Dictation

## 2022-01-27 ENCOUNTER — TELEPHONE (OUTPATIENT)
Dept: CARDIOLOGY | Facility: CLINIC | Age: 37
End: 2022-01-27

## 2022-01-27 NOTE — TELEPHONE ENCOUNTER
Received VM from patient mother in regard to wether or not patient to continue metoprolol    SW patient. Advised was to continue metoprolol with flecainide. Voiced understanding

## 2022-01-31 ENCOUNTER — CLINICAL SUPPORT (OUTPATIENT)
Dept: CARDIOLOGY | Facility: CLINIC | Age: 37
End: 2022-01-31

## 2022-01-31 DIAGNOSIS — I48.0 PAROXYSMAL ATRIAL FIBRILLATION: ICD-10-CM

## 2022-01-31 PROCEDURE — 93000 ELECTROCARDIOGRAM COMPLETE: CPT | Performed by: NURSE PRACTITIONER

## 2022-02-01 ENCOUNTER — OFFICE VISIT (OUTPATIENT)
Dept: INTERNAL MEDICINE | Facility: CLINIC | Age: 37
End: 2022-02-01

## 2022-02-01 VITALS
BODY MASS INDEX: 37.19 KG/M2 | HEIGHT: 77 IN | OXYGEN SATURATION: 97 % | TEMPERATURE: 97.2 F | WEIGHT: 315 LBS | SYSTOLIC BLOOD PRESSURE: 136 MMHG | HEART RATE: 66 BPM | DIASTOLIC BLOOD PRESSURE: 84 MMHG

## 2022-02-01 DIAGNOSIS — R35.0 URINARY FREQUENCY: ICD-10-CM

## 2022-02-01 DIAGNOSIS — I48.0 PAROXYSMAL ATRIAL FIBRILLATION: ICD-10-CM

## 2022-02-01 DIAGNOSIS — I10 ESSENTIAL HYPERTENSION: Primary | ICD-10-CM

## 2022-02-01 DIAGNOSIS — E78.5 HYPERLIPIDEMIA LDL GOAL <100: ICD-10-CM

## 2022-02-01 DIAGNOSIS — R73.02 IMPAIRED GLUCOSE TOLERANCE: ICD-10-CM

## 2022-02-01 LAB
ALBUMIN SERPL-MCNC: 4.7 G/DL (ref 3.5–5.2)
ALBUMIN/GLOB SERPL: 1.7 G/DL
ALP SERPL-CCNC: 66 U/L (ref 39–117)
ALT SERPL W P-5'-P-CCNC: 28 U/L (ref 1–41)
ANION GAP SERPL CALCULATED.3IONS-SCNC: 12.4 MMOL/L (ref 5–15)
AST SERPL-CCNC: 18 U/L (ref 1–40)
BASOPHILS # BLD AUTO: 0.04 10*3/MM3 (ref 0–0.2)
BASOPHILS NFR BLD AUTO: 0.6 % (ref 0–1.5)
BILIRUB BLD-MCNC: NEGATIVE MG/DL
BILIRUB SERPL-MCNC: 1.3 MG/DL (ref 0–1.2)
BUN SERPL-MCNC: 8 MG/DL (ref 6–20)
BUN/CREAT SERPL: 10.1 (ref 7–25)
CALCIUM SPEC-SCNC: 10 MG/DL (ref 8.6–10.5)
CHLORIDE SERPL-SCNC: 102 MMOL/L (ref 98–107)
CHOLEST SERPL-MCNC: 148 MG/DL (ref 0–200)
CLARITY, POC: ABNORMAL
CO2 SERPL-SCNC: 26.6 MMOL/L (ref 22–29)
COLOR UR: ABNORMAL
CREAT SERPL-MCNC: 0.79 MG/DL (ref 0.76–1.27)
DEPRECATED RDW RBC AUTO: 39 FL (ref 37–54)
EOSINOPHIL # BLD AUTO: 0.05 10*3/MM3 (ref 0–0.4)
EOSINOPHIL NFR BLD AUTO: 0.8 % (ref 0.3–6.2)
ERYTHROCYTE [DISTWIDTH] IN BLOOD BY AUTOMATED COUNT: 11.9 % (ref 12.3–15.4)
EXPIRATION DATE: ABNORMAL
GFR SERPL CREATININE-BSD FRML MDRD: 112 ML/MIN/1.73
GLOBULIN UR ELPH-MCNC: 2.8 GM/DL
GLUCOSE SERPL-MCNC: 86 MG/DL (ref 65–99)
GLUCOSE UR STRIP-MCNC: NEGATIVE MG/DL
HBA1C MFR BLD: 5.2 % (ref 4.8–5.6)
HCT VFR BLD AUTO: 46.8 % (ref 37.5–51)
HDLC SERPL-MCNC: 37 MG/DL (ref 40–60)
HGB BLD-MCNC: 16.2 G/DL (ref 13–17.7)
KETONES UR QL: NEGATIVE
LDLC SERPL CALC-MCNC: 89 MG/DL (ref 0–100)
LDLC/HDLC SERPL: 2.36 {RATIO}
LEUKOCYTE EST, POC: NEGATIVE
LYMPHOCYTES # BLD AUTO: 1.98 10*3/MM3 (ref 0.7–3.1)
LYMPHOCYTES NFR BLD AUTO: 31.6 % (ref 19.6–45.3)
Lab: ABNORMAL
MCH RBC QN AUTO: 31.5 PG (ref 26.6–33)
MCHC RBC AUTO-ENTMCNC: 34.6 G/DL (ref 31.5–35.7)
MCV RBC AUTO: 90.9 FL (ref 79–97)
MONOCYTES # BLD AUTO: 0.39 10*3/MM3 (ref 0.1–0.9)
MONOCYTES NFR BLD AUTO: 6.2 % (ref 5–12)
NEUTROPHILS NFR BLD AUTO: 3.79 10*3/MM3 (ref 1.7–7)
NEUTROPHILS NFR BLD AUTO: 60.6 % (ref 42.7–76)
NITRITE UR-MCNC: NEGATIVE MG/ML
PH UR: 6 [PH] (ref 5–8)
PLATELET # BLD AUTO: 223 10*3/MM3 (ref 140–450)
PMV BLD AUTO: 14.6 FL (ref 6–12)
POTASSIUM SERPL-SCNC: 4.2 MMOL/L (ref 3.5–5.2)
PROT SERPL-MCNC: 7.5 G/DL (ref 6–8.5)
PROT UR STRIP-MCNC: NEGATIVE MG/DL
RBC # BLD AUTO: 5.15 10*6/MM3 (ref 4.14–5.8)
RBC # UR STRIP: NEGATIVE /UL
SODIUM SERPL-SCNC: 141 MMOL/L (ref 136–145)
SP GR UR: 1.03 (ref 1–1.03)
TRIGL SERPL-MCNC: 119 MG/DL (ref 0–150)
TSH SERPL DL<=0.05 MIU/L-ACNC: 1.35 UIU/ML (ref 0.27–4.2)
UROBILINOGEN UR QL: NORMAL
VLDLC SERPL-MCNC: 22 MG/DL (ref 5–40)
WBC NRBC COR # BLD: 6.26 10*3/MM3 (ref 3.4–10.8)

## 2022-02-01 PROCEDURE — 84443 ASSAY THYROID STIM HORMONE: CPT | Performed by: INTERNAL MEDICINE

## 2022-02-01 PROCEDURE — 87086 URINE CULTURE/COLONY COUNT: CPT | Performed by: INTERNAL MEDICINE

## 2022-02-01 PROCEDURE — 85025 COMPLETE CBC W/AUTO DIFF WBC: CPT | Performed by: INTERNAL MEDICINE

## 2022-02-01 PROCEDURE — 83036 HEMOGLOBIN GLYCOSYLATED A1C: CPT | Performed by: INTERNAL MEDICINE

## 2022-02-01 PROCEDURE — 80053 COMPREHEN METABOLIC PANEL: CPT | Performed by: INTERNAL MEDICINE

## 2022-02-01 PROCEDURE — 99204 OFFICE O/P NEW MOD 45 MIN: CPT | Performed by: INTERNAL MEDICINE

## 2022-02-01 PROCEDURE — 80061 LIPID PANEL: CPT | Performed by: INTERNAL MEDICINE

## 2022-02-01 NOTE — PROGRESS NOTES
"Chief Complaint  Establish Care and Urinary Frequency (feels like he has to pee constantly and hardly anything comes out- did have a kidney stone and has a cyst on RT kidney. )    Subjective          Vance Lorenzo presents to [facility]  History of Present Illness  Previous PCP: Dr. Nair   Specialist(s): Rocco VARGAS vaccine: Refuses  Flu vaccine: refuses  Pneumonia vaccine: n/a  Shingles vaccine:n/a  Colon cancer screenin for hemorrhoids.      hypertension- patient reports home Blood Pressure readings 115/120.   hyperlipidemia- doing well on statin  Atrial fibrillation- found in 2018 while doing colonoscopy. patient on toprol. Patient recently started on flecainide for more recent palpitations.   Patient reports having  Urinary frequency for approx 1 year. Patient has history of nephrolithiasis.   Elevated glucose noted on previous labs.       Current Outpatient Medications   Medication Instructions   • acetaminophen (TYLENOL) 500 mg, Oral, As Needed   • atorvastatin (LIPITOR) 20 MG tablet TAKE ONE TABLET BY MOUTH AT BEDTIME   • Eliquis 5 mg, Oral, 2 Times Daily   • flecainide (TAMBOCOR) 50 mg, Oral, 2 Times Daily   • metoprolol succinate XL (TOPROL-XL) 100 MG 24 hr tablet TAKE ONE TABLET BY MOUTH TWICE DAILY   • prednisoLONE acetate (PRED FORTE) 1 % ophthalmic suspension INSTILL ONE DROP IN EACH EYE FOUR TIMES DAILY FOR 14 DAYS (SHAKE BOTTLE PRIOR TO INSTILLING DROPS)       The following portions of the patient's history were reviewed and updated as appropriate: allergies, current medications, past family history, past medical history, past social history, past surgical history, and problem list.    Objective   Vital Signs:   /84   Pulse 66   Temp 97.2 °F (36.2 °C) (Temporal)   Ht 195.6 cm (77\")   Wt (!) 155 kg (341 lb 12.8 oz)   SpO2 97%   BMI 40.53 kg/m²     Wt Readings from Last 3 Encounters:   22 (!) 155 kg (341 lb 12.8 " oz)   01/26/22 (!) 154 kg (340 lb)   01/15/22 (!) 157 kg (345 lb 3.9 oz)     BP Readings from Last 3 Encounters:   02/01/22 136/84   01/26/22 135/80   01/15/22 124/87     Physical Exam   Appearance: No acute distress, well-nourished  Head: normocephalic, atraumatic  Eyes: extraocular movements intact, no scleral icterus, no conjunctival injection  Ears, Nose, and Throat: external ears normal, nares patent, moist mucous membranes  Cardiovascular: regular rate and rhythm. no murmurs, rales, or rhonchi. no edema  Respiratory: breathing comfortably, symmetric chest rise, clear to auscultation bilaterally. No wheezes, rales, or rhonchi.  Neuro: alert and oriented to time, place, and person. Normal gait  Psych: normal mood and affect     Result Review :   The following data was reviewed by: Onesimo Thayer Jr, MD on 02/01/2022:  Common labs    Common Labsle 10/27/21 10/27/21 1/15/22 1/15/22    1625 1625 1656 1656   Glucose  165 (A)  105 (A)   BUN  10  8   Creatinine  0.88  0.82   eGFR Non African Am  99  108   Sodium  141  139   Potassium  4.0  3.9   Chloride  103  101   Calcium  9.5  9.8   Albumin  4.80  4.80   Total Bilirubin  1.0  1.1   Alkaline Phosphatase  62  74   AST (SGOT)  28  23   ALT (SGPT)  40  35   WBC 6.93  7.92    Hemoglobin 16.0  17.1    Hematocrit 45.6  47.1    Platelets 222  267    (A) Abnormal value       Comments are available for some flowsheets but are not being displayed.               Brief Urine Lab Results  (Last result in the past 365 days)      Color   Clarity   Blood   Leuk Est   Nitrite   Protein   CREAT   Urine HCG        02/01/22 1226 Dark Yellow   Cloudy   Negative   Negative   Negative   Negative                 Assessment and Plan    Diagnoses and all orders for this visit:    1. Essential hypertension (Primary)  Comments:  well controlled on regimen.  Orders:  -     Comprehensive Metabolic Panel  -     CBC & Differential    2. Hyperlipidemia LDL goal <100  Comments:  check  lipids. consider wean off statin.   Orders:  -     Lipid Panel  -     Comprehensive Metabolic Panel    3. Paroxysmal atrial fibrillation  Comments:  cards noted reviewed. cont eliquis, toprol and flecainide. pt is rate controlled today. cont f/u with cardiology.   Orders:  -     TSH    4. Impaired glucose tolerance  -     Hemoglobin A1c    5. Urinary frequency  Comments:  urine dip neg. possibly related to hyperglycemia?  Orders:  -     Urine Culture - Urine, Urine, Clean Catch  -     POCT urinalysis dipstick, automated          There are no discontinued medications.       Follow Up   Return in about 6 months (around 8/1/2022) for Recheck.  Patient was given instructions and counseling regarding his condition or for health maintenance advice. Please see specific information pulled into the AVS if appropriate.

## 2022-02-02 LAB — BACTERIA SPEC AEROBE CULT: NO GROWTH

## 2022-02-03 ENCOUNTER — TELEPHONE (OUTPATIENT)
Dept: INTERNAL MEDICINE | Facility: CLINIC | Age: 37
End: 2022-02-03

## 2022-02-03 NOTE — PROGRESS NOTES
Procedure     ECG 12 Lead    Date/Time: 1/31/2022 8:23 AM  Performed by: Samia Veliz APRN  Authorized by: Samia Veliz APRN   Comparison: compared with previous ECG   Similar to previous ECG  Rhythm: sinus rhythm  Rate: normal  BPM: 61  Conduction: conduction normal  ST Segments: ST segments normal  T Waves: T waves normal  QRS axis: normal  Other findings: low voltage    Clinical impression: normal ECG

## 2022-02-03 NOTE — TELEPHONE ENCOUNTER
ATTEMPTED TO CONTACT PT PER PROVIDER'S INSTRUCTIONS     NO ANSWER     LEFT VOICEMAIL WITH INSTRUCTIONS TO RETURN CALL TO OFFICE AT [phone]    OK FOR HUB TO ADVISE/READ   ----- Message from Onesimo Thayer Jr., MD sent at 2/2/2022  8:25 AM EST -----  All labs reassuring, and negative urine culture.

## 2022-02-07 ENCOUNTER — PATIENT MESSAGE (OUTPATIENT)
Dept: INTERNAL MEDICINE | Facility: CLINIC | Age: 37
End: 2022-02-07

## 2022-02-08 NOTE — TELEPHONE ENCOUNTER
From: Vance Lorenzo  To: Onesimo Thayer Jr, MD  Sent: 2/7/2022 10:03 AM EST  Subject: Bloodwork for Cholesterol    When I came in to visit you mentioned that you weren't sure why my previous doctor had me on a cholesterol pill. I noticed the bloodwork shows my cholesterol looks good. Should I continue or discontinue my cholesterol pill?

## 2022-02-22 RX ORDER — FLECAINIDE ACETATE 100 MG/1
TABLET ORAL
Qty: 90 TABLET | Refills: 3 | Status: SHIPPED | OUTPATIENT
Start: 2022-02-22 | End: 2022-09-12

## 2022-04-27 ENCOUNTER — CLINICAL SUPPORT (OUTPATIENT)
Dept: INTERNAL MEDICINE | Facility: CLINIC | Age: 37
End: 2022-04-27

## 2022-04-27 DIAGNOSIS — E78.5 HYPERLIPIDEMIA LDL GOAL <100: Primary | ICD-10-CM

## 2022-04-27 LAB
ALBUMIN SERPL-MCNC: 4.7 G/DL (ref 3.5–5.2)
ALBUMIN/GLOB SERPL: 1.6 G/DL
ALP SERPL-CCNC: 58 U/L (ref 39–117)
ALT SERPL W P-5'-P-CCNC: 17 U/L (ref 1–41)
ANION GAP SERPL CALCULATED.3IONS-SCNC: 10.3 MMOL/L (ref 5–15)
AST SERPL-CCNC: 20 U/L (ref 1–40)
BILIRUB SERPL-MCNC: 0.9 MG/DL (ref 0–1.2)
BUN SERPL-MCNC: 10 MG/DL (ref 6–20)
BUN/CREAT SERPL: 11 (ref 7–25)
CALCIUM SPEC-SCNC: 9.7 MG/DL (ref 8.6–10.5)
CHLORIDE SERPL-SCNC: 102 MMOL/L (ref 98–107)
CHOLEST SERPL-MCNC: 220 MG/DL (ref 0–200)
CO2 SERPL-SCNC: 26.7 MMOL/L (ref 22–29)
CREAT SERPL-MCNC: 0.91 MG/DL (ref 0.76–1.27)
EGFRCR SERPLBLD CKD-EPI 2021: 112.7 ML/MIN/1.73
GLOBULIN UR ELPH-MCNC: 2.9 GM/DL
GLUCOSE SERPL-MCNC: 96 MG/DL (ref 65–99)
HDLC SERPL-MCNC: 35 MG/DL (ref 40–60)
LDLC SERPL CALC-MCNC: 160 MG/DL (ref 0–100)
LDLC/HDLC SERPL: 4.5 {RATIO}
POTASSIUM SERPL-SCNC: 4.6 MMOL/L (ref 3.5–5.2)
PROT SERPL-MCNC: 7.6 G/DL (ref 6–8.5)
SODIUM SERPL-SCNC: 139 MMOL/L (ref 136–145)
TRIGL SERPL-MCNC: 137 MG/DL (ref 0–150)
VLDLC SERPL-MCNC: 25 MG/DL (ref 5–40)

## 2022-04-27 PROCEDURE — 80061 LIPID PANEL: CPT | Performed by: INTERNAL MEDICINE

## 2022-04-27 PROCEDURE — 36415 COLL VENOUS BLD VENIPUNCTURE: CPT | Performed by: INTERNAL MEDICINE

## 2022-04-27 PROCEDURE — 80053 COMPREHEN METABOLIC PANEL: CPT | Performed by: INTERNAL MEDICINE

## 2022-05-02 ENCOUNTER — HOSPITAL ENCOUNTER (OUTPATIENT)
Dept: NUCLEAR MEDICINE | Facility: HOSPITAL | Age: 37
Discharge: HOME | End: 2022-05-02

## 2022-05-02 DIAGNOSIS — R07.89 ATYPICAL CHEST PAIN: ICD-10-CM

## 2022-05-02 DIAGNOSIS — I48.0 PAROXYSMAL ATRIAL FIBRILLATION: ICD-10-CM

## 2022-05-02 PROCEDURE — 93018 CV STRESS TEST I&R ONLY: CPT | Performed by: INTERNAL MEDICINE

## 2022-05-02 PROCEDURE — 0 TECHNETIUM TETROFOSMIN KIT: Performed by: NURSE PRACTITIONER

## 2022-05-02 PROCEDURE — 78452 HT MUSCLE IMAGE SPECT MULT: CPT

## 2022-05-02 PROCEDURE — 25010000002 REGADENOSON 0.4 MG/5ML SOLUTION: Performed by: NURSE PRACTITIONER

## 2022-05-02 PROCEDURE — A9502 TC99M TETROFOSMIN: HCPCS | Performed by: NURSE PRACTITIONER

## 2022-05-02 PROCEDURE — 78452 HT MUSCLE IMAGE SPECT MULT: CPT | Performed by: INTERNAL MEDICINE

## 2022-05-02 PROCEDURE — 93017 CV STRESS TEST TRACING ONLY: CPT

## 2022-05-02 RX ADMIN — TETROFOSMIN 1 DOSE: 1.38 INJECTION, POWDER, LYOPHILIZED, FOR SOLUTION INTRAVENOUS at 08:43

## 2022-05-02 RX ADMIN — REGADENOSON 0.4 MG: 0.08 INJECTION, SOLUTION INTRAVENOUS at 08:44

## 2022-05-03 LAB
BH CV IMMEDIATE POST TECH DATA BLOOD PRESSURE: NORMAL MMHG
BH CV IMMEDIATE POST TECH DATA HEART RATE: 132 BPM
BH CV IMMEDIATE POST TECH DATA OXYGEN SATS: 97 %
BH CV REST NUCLEAR ISOTOPE DOSE: 38.1 MCI
BH CV SIX MINUTE RECOVERY TECH DATA BLOOD PRESSURE: NORMAL
BH CV SIX MINUTE RECOVERY TECH DATA HEART RATE: 94 BPM
BH CV SIX MINUTE RECOVERY TECH DATA OXYGEN SATURATION: 96 %
BH CV STRESS BP STAGE 1: NORMAL
BH CV STRESS BP STAGE 2: NORMAL
BH CV STRESS COMMENTS STAGE 1: NORMAL
BH CV STRESS COMMENTS STAGE 2: NORMAL
BH CV STRESS DOSE REGADENOSON STAGE 1: 0.4
BH CV STRESS DURATION MIN STAGE 1: 0
BH CV STRESS DURATION MIN STAGE 2: 4
BH CV STRESS DURATION SEC STAGE 1: 10
BH CV STRESS DURATION SEC STAGE 2: 0
BH CV STRESS HR STAGE 1: 102
BH CV STRESS HR STAGE 2: 132
BH CV STRESS NUCLEAR ISOTOPE DOSE: 33.3 MCI
BH CV STRESS O2 STAGE 1: 94
BH CV STRESS O2 STAGE 2: 91
BH CV STRESS PROTOCOL 1: NORMAL
BH CV STRESS RECOVERY BP: NORMAL MMHG
BH CV STRESS RECOVERY HR: 94 BPM
BH CV STRESS RECOVERY O2: 97 %
BH CV STRESS STAGE 1: 1
BH CV STRESS STAGE 2: 2
BH CV THREE MINUTE POST TECH DATA BLOOD PRESSURE: NORMAL MMHG
BH CV THREE MINUTE POST TECH DATA HEART RATE: 99 BPM
BH CV THREE MINUTE POST TECH DATA OXYGEN SATURATION: 97 %
LV EF NUC BP: 54 %
MAXIMAL PREDICTED HEART RATE: 185 BPM
PERCENT MAX PREDICTED HR: 71.35 %
STRESS BASELINE BP: NORMAL MMHG
STRESS BASELINE HR: 76 BPM
STRESS O2 SAT REST: 94 %
STRESS PERCENT HR: 84 %
STRESS POST O2 SAT PEAK: 91 %
STRESS POST PEAK BP: NORMAL MMHG
STRESS POST PEAK HR: 132 BPM
STRESS TARGET HR: 157 BPM

## 2022-05-03 PROCEDURE — 0 TECHNETIUM TETROFOSMIN KIT: Performed by: NURSE PRACTITIONER

## 2022-05-03 PROCEDURE — A9502 TC99M TETROFOSMIN: HCPCS | Performed by: NURSE PRACTITIONER

## 2022-05-03 RX ADMIN — TETROFOSMIN 1 DOSE: 1.38 INJECTION, POWDER, LYOPHILIZED, FOR SOLUTION INTRAVENOUS at 08:48

## 2022-05-18 ENCOUNTER — TRANSCRIBE ORDERS (OUTPATIENT)
Dept: LAB | Facility: HOSPITAL | Age: 37
End: 2022-05-18

## 2022-05-18 DIAGNOSIS — H04.123 TEAR FILM INSUFFICIENCY, BILATERAL: ICD-10-CM

## 2022-05-18 DIAGNOSIS — H16.143 KERATITIS, SUPERFICIAL, PUNCTATE, BILATERAL: Primary | ICD-10-CM

## 2022-05-18 DIAGNOSIS — H04.123 DRY EYE SYNDROME, BILATERAL: ICD-10-CM

## 2022-05-19 ENCOUNTER — LAB (OUTPATIENT)
Dept: LAB | Facility: HOSPITAL | Age: 37
End: 2022-05-19

## 2022-05-19 DIAGNOSIS — H04.123 DRY EYE SYNDROME, BILATERAL: ICD-10-CM

## 2022-05-19 DIAGNOSIS — H16.143 KERATITIS, SUPERFICIAL, PUNCTATE, BILATERAL: ICD-10-CM

## 2022-05-19 DIAGNOSIS — H04.123 TEAR FILM INSUFFICIENCY, BILATERAL: ICD-10-CM

## 2022-05-19 DIAGNOSIS — E78.5 HYPERLIPIDEMIA LDL GOAL <100: ICD-10-CM

## 2022-05-19 LAB
ALBUMIN SERPL-MCNC: 4.5 G/DL (ref 3.5–5.2)
ALP SERPL-CCNC: 58 U/L (ref 39–117)
ALT SERPL W P-5'-P-CCNC: 17 U/L (ref 1–41)
AST SERPL-CCNC: 15 U/L (ref 1–40)
BASOPHILS # BLD AUTO: 0.05 10*3/MM3 (ref 0–0.2)
BASOPHILS NFR BLD AUTO: 0.9 % (ref 0–1.5)
BILIRUB CONJ SERPL-MCNC: <0.2 MG/DL (ref 0–0.3)
BILIRUB INDIRECT SERPL-MCNC: NORMAL MG/DL
BILIRUB SERPL-MCNC: 0.8 MG/DL (ref 0–1.2)
CHOLEST SERPL-MCNC: 203 MG/DL (ref 0–200)
CHROMATIN AB SERPL-ACNC: <10 IU/ML (ref 0–14)
CRP SERPL-MCNC: <0.3 MG/DL (ref 0–0.5)
DEPRECATED RDW RBC AUTO: 42.2 FL (ref 37–54)
EOSINOPHIL # BLD AUTO: 0.06 10*3/MM3 (ref 0–0.4)
EOSINOPHIL NFR BLD AUTO: 1 % (ref 0.3–6.2)
ERYTHROCYTE [DISTWIDTH] IN BLOOD BY AUTOMATED COUNT: 12.6 % (ref 12.3–15.4)
ERYTHROCYTE [SEDIMENTATION RATE] IN BLOOD: 12 MM/HR (ref 0–15)
HCT VFR BLD AUTO: 49.4 % (ref 37.5–51)
HDLC SERPL-MCNC: 42 MG/DL (ref 40–60)
HGB BLD-MCNC: 16.6 G/DL (ref 13–17.7)
IMM GRANULOCYTES # BLD AUTO: 0.01 10*3/MM3 (ref 0–0.05)
IMM GRANULOCYTES NFR BLD AUTO: 0.2 % (ref 0–0.5)
LDLC SERPL CALC-MCNC: 138 MG/DL (ref 0–100)
LDLC/HDLC SERPL: 3.23 {RATIO}
LYMPHOCYTES # BLD AUTO: 2.19 10*3/MM3 (ref 0.7–3.1)
LYMPHOCYTES NFR BLD AUTO: 37.5 % (ref 19.6–45.3)
MCH RBC QN AUTO: 31 PG (ref 26.6–33)
MCHC RBC AUTO-ENTMCNC: 33.6 G/DL (ref 31.5–35.7)
MCV RBC AUTO: 92.2 FL (ref 79–97)
MONOCYTES # BLD AUTO: 0.41 10*3/MM3 (ref 0.1–0.9)
MONOCYTES NFR BLD AUTO: 7 % (ref 5–12)
NEUTROPHILS NFR BLD AUTO: 3.12 10*3/MM3 (ref 1.7–7)
NEUTROPHILS NFR BLD AUTO: 53.4 % (ref 42.7–76)
NRBC BLD AUTO-RTO: 0 /100 WBC (ref 0–0.2)
PLATELET # BLD AUTO: 223 10*3/MM3 (ref 140–450)
PMV BLD AUTO: 13.9 FL (ref 6–12)
PROT SERPL-MCNC: 7.6 G/DL (ref 6–8.5)
RBC # BLD AUTO: 5.36 10*6/MM3 (ref 4.14–5.8)
T PALLIDUM IGG SER QL: NORMAL
TRIGL SERPL-MCNC: 126 MG/DL (ref 0–150)
VLDLC SERPL-MCNC: 23 MG/DL (ref 5–40)
WBC NRBC COR # BLD: 5.84 10*3/MM3 (ref 3.4–10.8)

## 2022-05-19 PROCEDURE — 86696 HERPES SIMPLEX TYPE 2 TEST: CPT

## 2022-05-19 PROCEDURE — 86225 DNA ANTIBODY NATIVE: CPT

## 2022-05-19 PROCEDURE — 86038 ANTINUCLEAR ANTIBODIES: CPT

## 2022-05-19 PROCEDURE — 86780 TREPONEMA PALLIDUM: CPT

## 2022-05-19 PROCEDURE — 82164 ANGIOTENSIN I ENZYME TEST: CPT

## 2022-05-19 PROCEDURE — 85025 COMPLETE CBC W/AUTO DIFF WBC: CPT

## 2022-05-19 PROCEDURE — 80061 LIPID PANEL: CPT

## 2022-05-19 PROCEDURE — 80076 HEPATIC FUNCTION PANEL: CPT

## 2022-05-19 PROCEDURE — 83520 IMMUNOASSAY QUANT NOS NONAB: CPT

## 2022-05-19 PROCEDURE — 86037 ANCA TITER EACH ANTIBODY: CPT

## 2022-05-19 PROCEDURE — 86694 HERPES SIMPLEX NES ANTBDY: CPT

## 2022-05-19 PROCEDURE — 86593 SYPHILIS TEST NON-TREP QUANT: CPT

## 2022-05-19 PROCEDURE — 81374 HLA I TYPING 1 ANTIGEN LR: CPT

## 2022-05-19 PROCEDURE — 36415 COLL VENOUS BLD VENIPUNCTURE: CPT

## 2022-05-19 PROCEDURE — 86140 C-REACTIVE PROTEIN: CPT

## 2022-05-19 PROCEDURE — 86695 HERPES SIMPLEX TYPE 1 TEST: CPT

## 2022-05-19 PROCEDURE — 86431 RHEUMATOID FACTOR QUANT: CPT

## 2022-05-19 PROCEDURE — 85549 MURAMIDASE: CPT

## 2022-05-19 PROCEDURE — 85652 RBC SED RATE AUTOMATED: CPT

## 2022-05-20 LAB
ACE SERPL-CCNC: 34 U/L (ref 14–82)
HSV1 IGG SER IA-ACNC: 54.4 INDEX (ref 0–0.9)
HSV1+2 IGM SER IA-ACNC: <0.91 RATIO (ref 0–0.9)
HSV2 IGG SER IA-ACNC: <0.91 INDEX (ref 0–0.9)
RPR SER-TITR: NON REACTIVE {TITER}

## 2022-05-21 LAB
DSDNA IGG SERPL IA-ACNC: NEGATIVE [IU]/ML
NUCLEAR IGG SER IA-RTO: NEGATIVE

## 2022-05-23 ENCOUNTER — TELEPHONE (OUTPATIENT)
Dept: CARDIOLOGY | Facility: CLINIC | Age: 37
End: 2022-05-23

## 2022-05-23 LAB
C-ANCA TITR SER IF: NORMAL TITER
LYSOZYME SERPL-MCNC: 3.5 UG/ML (ref 3–12.8)
MYELOPEROXIDASE AB SER IA-ACNC: <9 U/ML (ref 0–9)
P-ANCA ATYPICAL TITR SER IF: NORMAL TITER
P-ANCA TITR SER IF: NORMAL TITER
PROTEINASE3 AB SER IA-ACNC: <3.5 U/ML (ref 0–3.5)

## 2022-05-23 NOTE — TELEPHONE ENCOUNTER
----- Message from MARSHA Olson sent at 5/19/2022  6:04 PM EDT -----  LDL is elevated, advise low fat diet, no statin therapy is indicated at this time

## 2022-05-24 LAB — HLA-B27 QL NAA+PROBE: NEGATIVE

## 2022-06-06 RX ORDER — METOPROLOL SUCCINATE 100 MG/1
TABLET, EXTENDED RELEASE ORAL
Qty: 180 TABLET | Refills: 0 | Status: SHIPPED | OUTPATIENT
Start: 2022-06-06 | End: 2022-07-14

## 2022-06-09 ENCOUNTER — OFFICE VISIT (OUTPATIENT)
Dept: CARDIOLOGY | Facility: CLINIC | Age: 37
End: 2022-06-09

## 2022-06-09 VITALS
HEART RATE: 70 BPM | BODY MASS INDEX: 37.19 KG/M2 | WEIGHT: 315 LBS | DIASTOLIC BLOOD PRESSURE: 89 MMHG | SYSTOLIC BLOOD PRESSURE: 147 MMHG | HEIGHT: 77 IN

## 2022-06-09 DIAGNOSIS — E78.5 HYPERLIPIDEMIA LDL GOAL <100: ICD-10-CM

## 2022-06-09 DIAGNOSIS — I10 ESSENTIAL HYPERTENSION: ICD-10-CM

## 2022-06-09 DIAGNOSIS — I48.0 PAROXYSMAL ATRIAL FIBRILLATION: Primary | ICD-10-CM

## 2022-06-09 PROCEDURE — 99214 OFFICE O/P EST MOD 30 MIN: CPT | Performed by: INTERNAL MEDICINE

## 2022-06-09 RX ORDER — NEPAFENAC 0.3 %
SUSPENSION, DROPS(FINAL DOSAGE FORM)(ML) OPHTHALMIC (EYE)
COMMUNITY
Start: 2022-03-09 | End: 2022-08-01

## 2022-06-09 RX ORDER — DOXYCYCLINE 100 MG/1
CAPSULE ORAL
COMMUNITY
Start: 2022-05-13 | End: 2022-08-01

## 2022-06-09 RX ORDER — LOTEPREDNOL ETABONATE 5 MG/ML
SUSPENSION/ DROPS OPHTHALMIC
COMMUNITY
Start: 2022-05-18

## 2022-06-09 NOTE — ASSESSMENT & PLAN NOTE
Symptom wise maintaining normal sinus rhythm continue with flecainide 100 twice daily for rhythm maintenance and Eliquis 5 twice daily for CVA prevention  Counseled patient for avoidance of atrial fibrillation to  · Avoid alcohol consumption  · Aerobic activity 30 minutes or greater per day 5 times per week  · Weight loss

## 2022-06-09 NOTE — ASSESSMENT & PLAN NOTE
Blood pressure mildly elevated may be whitecoat related gave him a 2-week log to record and return  Counseled patient on  • low-sodium diet of less than 2 g  • Aerobic activity 30 minutes a day 5 times a week  • Weight loss

## 2022-06-09 NOTE — PROGRESS NOTES
Chief Complaint  Atrial Fibrillation, Hypertension, and Hyperlipidemia    Subjective    Patient has been doing well no significant  atchycardic problems has had stable weights.  Denies any chest discomfort    Past Medical History:   Diagnosis Date   • Essential hypertension 12/1/2021   • Foot pain, bilateral 08/17/2018   • Hyperlipidemia LDL goal <100 12/1/2021   • Paroxysmal atrial fibrillation 12/1/2021   • Tinnitus    • Xerosis of skin 08/17/2018         Current Outpatient Medications:   •  acetaminophen (TYLENOL) 500 MG tablet, Take 500 mg by mouth As Needed., Disp: , Rfl:   •  doxycycline (MONODOX) 100 MG capsule, TAKE ONE CAPSULE BY MOUTH TWICE DAILY FOR 7 DAYS THEN TAKE ONE CAPSULE EVERY DAY FOR 3 WEEKS, Disp: , Rfl:   •  Eliquis 5 MG tablet tablet, Take 1 tablet by mouth 2 (Two) Times a Day., Disp: 60 tablet, Rfl: 11  •  flecainide (TAMBOCOR) 100 MG tablet, TAKE 1/2 TABLET BY MOUTH TWICE DAILY, Disp: 90 tablet, Rfl: 3  •  Ilevro 0.3 % suspension, INSTILL ONE DROP IN EACH EYE TWICE DAILY, Disp: , Rfl:   •  loteprednol (LOTEMAX) 0.5 % ophthalmic suspension, INSTILL 1 DROP IN EACH EYE FOUR TIMES DAILY, Disp: , Rfl:   •  metoprolol succinate XL (TOPROL-XL) 100 MG 24 hr tablet, TAKE ONE TABLET BY MOUTH TWICE DAILY, Disp: 180 tablet, Rfl: 0  •  prednisoLONE acetate (PRED FORTE) 1 % ophthalmic suspension, INSTILL ONE DROP IN EACH EYE FOUR TIMES DAILY FOR 14 DAYS (SHAKE BOTTLE PRIOR TO INSTILLING DROPS), Disp: , Rfl:     There are no discontinued medications.  No Known Allergies     Social History     Tobacco Use   • Smoking status: Never Smoker   • Smokeless tobacco: Never Used   Vaping Use   • Vaping Use: Never used   Substance Use Topics   • Alcohol use: Never   • Drug use: Never       Family History   Problem Relation Age of Onset   • Breast cancer Maternal Grandmother         50s   • Cancer Paternal Grandmother    • Cancer Paternal Grandfather    • Heart failure Paternal Grandfather         Objective     BP  "147/89   Pulse 70   Ht 195.6 cm (77\")   Wt (!) 153 kg (337 lb)   BMI 39.96 kg/m²       Physical Exam    General Appearance:   · no acute distress  · Alert and oriented x3  HENT:   · lips not cyanotic  · Atraumatic  Neck:  · No jvd   · supple  Respiratory:  · no respiratory distress  · normal breath sounds  · no rales  Cardiovascular:  · Regular rate and rhythm  · no S3, no S4   · no murmur  · no rub  Extremities  · No cyanosis  · lower extremity edema: Trace  Skin:   · warm, dry  · No rashes      Result Review :     proBNP   Date Value Ref Range Status   01/15/2022 53.4 0.0 - 450.0 pg/mL Final     CMP    CMP 2/1/22 4/27/22 5/19/22   Glucose 86 96    BUN 8 10    Creatinine 0.79 0.91    eGFR Non African Am 112     Sodium 141 139    Potassium 4.2 4.6    Chloride 102 102    Calcium 10.0 9.7    Albumin 4.70 4.70 4.50   Total Bilirubin 1.3 (A) 0.9 0.8   Alkaline Phosphatase 66 58 58   AST (SGOT) 18 20 15   ALT (SGPT) 28 17 17   (A) Abnormal value            CBC w/diff    CBC w/Diff 1/15/22 2/1/22 5/19/22   WBC 7.92 6.26 5.84   RBC 5.37 5.15 5.36   Hemoglobin 17.1 16.2 16.6   Hematocrit 47.1 46.8 49.4   MCV 87.7 90.9 92.2   MCH 31.8 31.5 31.0   MCHC 36.3 (A) 34.6 33.6   RDW 12.1 (A) 11.9 (A) 12.6   Platelets 267 223 223   Neutrophil Rel % 51.8 60.6 53.4   Immature Granulocyte Rel % 0.1  0.2   Lymphocyte Rel % 40.2 31.6 37.5   Monocyte Rel % 6.4 6.2 7.0   Eosinophil Rel % 1.0 0.8 1.0   Basophil Rel % 0.5 0.6 0.9   (A) Abnormal value             Lab Results   Component Value Date    TSH 1.350 02/01/2022      Lab Results   Component Value Date    FREET4 1.1 02/11/2019      No results found for: DDIMERQUANT  Magnesium   Date Value Ref Range Status   01/15/2022 2.0 1.6 - 2.6 mg/dL Final      No results found for: DIGOXIN   No results found for: TROPONINT        Lipid Panel    Lipid Panel 2/1/22 4/27/22 5/19/22   Total Cholesterol 148 220 (A) 203 (A)   Triglycerides 119 137 126   HDL Cholesterol 37 (A) 35 (A) 42   VLDL " Cholesterol 22 25 23   LDL Cholesterol  89 160 (A) 138 (A)   LDL/HDL Ratio 2.36 4.50 3.23   (A) Abnormal value            Lab Results   Component Value Date    POCTROP 0.00 01/15/2022                      Diagnoses and all orders for this visit:    1. Paroxysmal atrial fibrillation (Primary)  Assessment & Plan:  Symptom wise maintaining normal sinus rhythm continue with flecainide 100 twice daily for rhythm maintenance and Eliquis 5 twice daily for CVA prevention  Counseled patient for avoidance of atrial fibrillation to  · Avoid alcohol consumption  · Aerobic activity 30 minutes or greater per day 5 times per week  · Weight loss        2. Essential hypertension  Assessment & Plan:  Blood pressure mildly elevated may be whitecoat related gave him a 2-week log to record and return  Counseled patient on  • low-sodium diet of less than 2 g  • Aerobic activity 30 minutes a day 5 times a week  • Weight loss          3. Hyperlipidemia LDL goal <100          Follow Up     Return in about 6 months (around 12/9/2022) for EKG with F/U, Follow with Samia Veliz.          Patient was given instructions and counseling regarding his condition or for health maintenance advice. Please see specific information pulled into the AVS if appropriate.

## 2022-07-14 RX ORDER — CARVEDILOL 6.25 MG/1
6.25 TABLET ORAL 2 TIMES DAILY
Qty: 180 TABLET | Refills: 3 | Status: SHIPPED | OUTPATIENT
Start: 2022-07-14 | End: 2022-07-18

## 2022-07-15 ENCOUNTER — TELEPHONE (OUTPATIENT)
Dept: CARDIOLOGY | Facility: CLINIC | Age: 37
End: 2022-07-15

## 2022-07-15 NOTE — TELEPHONE ENCOUNTER
Received VM from patient    SW patient. Patient concerned about side effects he read on the coreg. Advised all medication has side effects. Patient to try medication and notify office if having side effects.

## 2022-07-18 ENCOUNTER — TELEPHONE (OUTPATIENT)
Dept: CARDIOLOGY | Facility: CLINIC | Age: 37
End: 2022-07-18

## 2022-07-18 RX ORDER — DILTIAZEM HYDROCHLORIDE 120 MG/1
120 CAPSULE, EXTENDED RELEASE ORAL DAILY
Qty: 90 CAPSULE | Refills: 3 | Status: SHIPPED | OUTPATIENT
Start: 2022-07-18 | End: 2022-08-02

## 2022-07-18 NOTE — TELEPHONE ENCOUNTER
SW patient. Patient states he started the coreg Saturday morning and after taking it he had hard time breathing and chest heaviness. Patient SW on call provider and was advised to not take anymore and to notify office.     Patient was recently switch from Metoprolol due to c/o ringing in ears. Patient states he still having the ringing.     Advised I would return call with recommendations.

## 2022-07-26 ENCOUNTER — TELEPHONE (OUTPATIENT)
Dept: CARDIOLOGY | Facility: CLINIC | Age: 37
End: 2022-07-26

## 2022-07-26 DIAGNOSIS — R06.02 SOB (SHORTNESS OF BREATH): Primary | ICD-10-CM

## 2022-07-26 NOTE — TELEPHONE ENCOUNTER
Patient c/o SOA with activity. [facility] check BNP. Patient notified. Voiced understanding.

## 2022-07-27 ENCOUNTER — LAB (OUTPATIENT)
Dept: LAB | Facility: HOSPITAL | Age: 37
End: 2022-07-27

## 2022-07-27 DIAGNOSIS — R06.02 SOB (SHORTNESS OF BREATH): ICD-10-CM

## 2022-07-27 LAB — NT-PROBNP SERPL-MCNC: 21.5 PG/ML (ref 0–450)

## 2022-07-27 PROCEDURE — 83880 ASSAY OF NATRIURETIC PEPTIDE: CPT

## 2022-07-27 PROCEDURE — 36415 COLL VENOUS BLD VENIPUNCTURE: CPT

## 2022-08-01 ENCOUNTER — TELEPHONE (OUTPATIENT)
Dept: CARDIOLOGY | Facility: CLINIC | Age: 37
End: 2022-08-01

## 2022-08-01 ENCOUNTER — OFFICE VISIT (OUTPATIENT)
Dept: INTERNAL MEDICINE | Facility: CLINIC | Age: 37
End: 2022-08-01

## 2022-08-01 VITALS
SYSTOLIC BLOOD PRESSURE: 159 MMHG | TEMPERATURE: 97.4 F | BODY MASS INDEX: 37.19 KG/M2 | HEIGHT: 77 IN | OXYGEN SATURATION: 97 % | DIASTOLIC BLOOD PRESSURE: 98 MMHG | WEIGHT: 315 LBS | HEART RATE: 81 BPM

## 2022-08-01 DIAGNOSIS — I10 ESSENTIAL HYPERTENSION: Primary | ICD-10-CM

## 2022-08-01 DIAGNOSIS — I48.0 PAROXYSMAL ATRIAL FIBRILLATION: ICD-10-CM

## 2022-08-01 DIAGNOSIS — L08.89 PITTED KERATOLYSIS: ICD-10-CM

## 2022-08-01 DIAGNOSIS — R60.0 LOCALIZED EDEMA: ICD-10-CM

## 2022-08-01 PROCEDURE — 99214 OFFICE O/P EST MOD 30 MIN: CPT | Performed by: INTERNAL MEDICINE

## 2022-08-01 RX ORDER — LIFITEGRAST 50 MG/ML
SOLUTION/ DROPS OPHTHALMIC
COMMUNITY
Start: 2022-07-12

## 2022-08-01 RX ORDER — CLINDAMYCIN HYDROCHLORIDE 300 MG/1
300 CAPSULE ORAL 2 TIMES DAILY
Qty: 28 CAPSULE | Refills: 0 | Status: SHIPPED | OUTPATIENT
Start: 2022-08-01 | End: 2022-08-15

## 2022-08-01 NOTE — TELEPHONE ENCOUNTER
SW patient voiced understanding. Patient to call when he needs new script. Patient just picked up 90 day supply.

## 2022-08-01 NOTE — TELEPHONE ENCOUNTER
Received VM from patient.     SW patient. Patient states he has had several afib episodes over last couple days. Patient states his heart increase heart racing.     Patient is taking:  Diltiazem 120 mg daily      Advised I would return call with recommendations.

## 2022-08-01 NOTE — PROGRESS NOTES
"Chief Complaint  Hypertension (6 month follow-up)    Subjective     {Problem List  Visit Diagnosis   Encounters  Notes  Medications  Labs  Result Review Imaging  Media :23}     Vance Lorenzo presents to [facility]  History of Present Illness  AFIB- patient no longer on toprol. Patient is now on diltiazem. Patient reports tinnitus improved with diltiazem. Patient is also on eliquis.   hypertension- patient reports 120s/80s while checking Blood Pressure at home.   Patient report swelling in left leg that will also cause paresthesias in LLE. Patient reports starting taking diltiazem higher dose today. Patient started it 2-3 weeks ago.   Patient reports dry skin and cracked skin on feet for several years  Patient reports smelly, yellowish discharge from umbilicus. Patient denies fevers.   Patient has sleep test scheduled for 8/30/2022    Current Outpatient Medications   Medication Instructions   • clindamycin (CLEOCIN) 300 mg, Oral, 2 Times Daily   • dilTIAZem XR (DILACOR XR) 120 mg, Oral, Daily   • Eliquis 5 mg, Oral, 2 Times Daily   • flecainide (TAMBOCOR) 100 MG tablet TAKE 1/2 TABLET BY MOUTH TWICE DAILY   • loteprednol (LOTEMAX) 0.5 % ophthalmic suspension INSTILL 1 DROP IN EACH EYE FOUR TIMES DAILY   • Xiidra 5 % ophthalmic solution INSTILL 1 DROP IN EACH EYE TWICE DAILY       The following portions of the patient's history were reviewed and updated as appropriate: allergies, current medications, past family history, past medical history, past social history, past surgical history, and problem list.    Objective   Vital Signs:   /98 (BP Location: Left arm, Patient Position: Sitting, Cuff Size: Large Adult)   Pulse 81   Temp 97.4 °F (36.3 °C) (Temporal)   Ht 195.6 cm (77\")   Wt (!) 150 kg (330 lb)   SpO2 97%   BMI 39.13 kg/m²     Wt Readings from Last 3 Encounters:   08/01/22 (!) 150 kg (330 lb)   06/09/22 (!) 153 kg (337 lb)   02/01/22 (!) " 155 kg (341 lb 12.8 oz)     BP Readings from Last 3 Encounters:   08/01/22 159/98   06/09/22 147/89   02/01/22 136/84     Physical Exam   Appearance: No acute distress, well-nourished  Head: normocephalic, atraumatic  Eyes: extraocular movements intact, no scleral icterus, no conjunctival injection  Ears, Nose, and Throat: external ears normal, nares patent, moist mucous membranes  Cardiovascular: regular rate and rhythm. no murmurs, rubs, or gallops. no edema  Respiratory: breathing comfortably, symmetric chest rise, clear to auscultation bilaterally. No wheezes, rales, or rhonchi.  Neuro: alert and oriented to time, place, and person. Normal gait  Psych: normal mood and affect     Result Review :   The following data was reviewed by: Onesimo Thayer Jr, MD on 08/01/2022:  Common labs    Common Labsle 2/1/22 2/1/22 2/1/22 2/1/22 4/27/22 4/27/22 5/19/22 5/19/22 5/19/22    1011 1011 1011 1011 1041 1041 0751 0751 0751   Glucose    86 96       BUN    8 10       Creatinine    0.79 0.91       eGFR Non African Am    112        Sodium    141 139       Potassium    4.2 4.6       Chloride    102 102       Calcium    10.0 9.7       Albumin    4.70 4.70    4.50   Total Bilirubin    1.3 (A) 0.9    0.8   Alkaline Phosphatase    66 58    58   AST (SGOT)    18 20    15   ALT (SGPT)    28 17    17   WBC 6.26      5.84     Hemoglobin 16.2      16.6     Hematocrit 46.8      49.4     Platelets 223      223     Total Cholesterol   148   220 (A)  203 (A)    Triglycerides   119   137  126    HDL Cholesterol   37 (A)   35 (A)  42    LDL Cholesterol    89   160 (A)  138 (A)    Hemoglobin A1C  5.20          (A) Abnormal value              Lab Results   Component Value Date    BILIRUBINUR Negative 02/01/2022          Assessment and Plan    Diagnoses and all orders for this visit:    1. Essential hypertension (Primary)  Comments:  elevated today likely related to switch from toprol to diltiazem. will discuss with Dr. Castellano. consider  adding diuretic.     2. Pitted keratolysis  -     clindamycin (CLEOCIN) 300 MG capsule; Take 1 capsule by mouth 2 (Two) Times a Day for 14 days.  Dispense: 28 capsule; Refill: 0    3. Paroxysmal atrial fibrillation  Comments:  cont eliquis. HR controlled with diltiazem and flecainade. will discuss edema side effects with Dr. Castellano about potentially emd adjustments.     4. Localized edema  Comments:  possibly related to med side effect. also discussed compression socks and/or diuresis.           Medications Discontinued During This Encounter   Medication Reason   • acetaminophen (TYLENOL) 500 MG tablet    • doxycycline (MONODOX) 100 MG capsule    • Ilevro 0.3 % suspension    • prednisoLONE acetate (PRED FORTE) 1 % ophthalmic suspension         Follow Up   No follow-ups on file.  Patient was given instructions and counseling regarding his condition or for health maintenance advice. Please see specific information pulled into the AVS if appropriate.       Onesimo Thayer Jr, MD  08/01/22  17:34 EDT

## 2022-08-02 DIAGNOSIS — I10 ESSENTIAL HYPERTENSION: Primary | ICD-10-CM

## 2022-08-02 DIAGNOSIS — I48.0 PAROXYSMAL ATRIAL FIBRILLATION: ICD-10-CM

## 2022-08-02 RX ORDER — DILTIAZEM HYDROCHLORIDE 240 MG/1
240 CAPSULE, EXTENDED RELEASE ORAL DAILY
Qty: 90 CAPSULE | Refills: 3 | Status: SHIPPED | OUTPATIENT
Start: 2022-08-02

## 2022-08-02 RX ORDER — HYDROCHLOROTHIAZIDE 25 MG/1
25 TABLET ORAL DAILY
Qty: 90 TABLET | Refills: 1 | Status: SHIPPED | OUTPATIENT
Start: 2022-08-02 | End: 2022-12-28

## 2022-08-29 ENCOUNTER — TELEPHONE (OUTPATIENT)
Dept: CARDIOLOGY | Facility: CLINIC | Age: 37
End: 2022-08-29

## 2022-08-29 NOTE — TELEPHONE ENCOUNTER
After discussing with Dr Castellano, it was recommended to the patient that he can either titrate up on his flecainide dose to 100 mg twice daily or switch to sotalol. He is agreeable to titrating up on flecainide. I explained to the patient that he should come in for EKG only visit to monitor QT. He verbalizes understanding.

## 2022-08-30 ENCOUNTER — OFFICE VISIT (OUTPATIENT)
Dept: SLEEP MEDICINE | Facility: HOSPITAL | Age: 37
End: 2022-08-30

## 2022-08-30 VITALS — WEIGHT: 315 LBS | BODY MASS INDEX: 37.19 KG/M2 | HEIGHT: 77 IN | OXYGEN SATURATION: 97 % | HEART RATE: 87 BPM

## 2022-08-30 DIAGNOSIS — E66.9 OBESITY (BMI 30-39.9): ICD-10-CM

## 2022-08-30 DIAGNOSIS — I48.0 PAROXYSMAL ATRIAL FIBRILLATION: ICD-10-CM

## 2022-08-30 DIAGNOSIS — G47.8 NON-RESTORATIVE SLEEP: ICD-10-CM

## 2022-08-30 DIAGNOSIS — G47.10 HYPERSOMNIA: ICD-10-CM

## 2022-08-30 DIAGNOSIS — G47.30 SLEEP APNEA, UNSPECIFIED TYPE: Primary | ICD-10-CM

## 2022-08-30 PROCEDURE — G0463 HOSPITAL OUTPT CLINIC VISIT: HCPCS | Performed by: FAMILY MEDICINE

## 2022-08-30 PROCEDURE — 99204 OFFICE O/P NEW MOD 45 MIN: CPT | Performed by: FAMILY MEDICINE

## 2022-08-30 NOTE — PROGRESS NOTES
"[facility] New Patient/Consultation       Reason for Consultation: Eye condition      Patient Care Team:  Onesimo Thayer Jr., MD as PCP - General (Internal Medicine)  David Denis MD as Consulting Physician (Sleep Medicine)      History of present illness:  Thank you for asking me to see your patient.  The patient is a 37 y.o. male hypertension atrial fibrillation presents today with concern for sleep disorder.  Referred by eye specialist due to concerns of oxygen not getting denies concern for sleep apnea.  No history of prior sleep study or tonsillectomy.  Patient reports waking with dry mouth nocturia up to 1 time a night.  Sometimes can have hypersomnia nonrestorative sleep as evidenced by naps sometimes during the day.  There is a family history of sleep apnea.  Obese BMI 38.8.    Bedtime 10 PM to 11 PM sleep latency 1 hour wake time 7 AM to 9 AM sleep 6 to 7 hours sometimes naps.      Social History: No tobacco alcohol caffeine or drug use    Allergies:  Metoprolol    Family History: NANETTE yes       Current Outpatient Medications:   •  flecainide (TAMBOCOR) 100 MG tablet, TAKE 1/2 TABLET BY MOUTH TWICE DAILY (Patient taking differently: Take 100 mg by mouth 2 (Two) Times a Day. 1 tab), Disp: 90 tablet, Rfl: 3  •  dilTIAZem XR (DILACOR XR) 240 MG 24 hr capsule, Take 1 capsule by mouth Daily., Disp: 90 capsule, Rfl: 3  •  Eliquis 5 MG tablet tablet, Take 1 tablet by mouth 2 (Two) Times a Day., Disp: 60 tablet, Rfl: 11  •  hydroCHLOROthiazide (HYDRODIURIL) 25 MG tablet, Take 1 tablet by mouth Daily., Disp: 90 tablet, Rfl: 1  •  loteprednol (LOTEMAX) 0.5 % ophthalmic suspension, INSTILL 1 DROP IN EACH EYE FOUR TIMES DAILY, Disp: , Rfl:   •  Xiidra 5 % ophthalmic solution, INSTILL 1 DROP IN EACH EYE TWICE DAILY, Disp: , Rfl:     Vital Signs:    Vitals:    08/30/22 1100   Pulse: 87   SpO2: 97%   Weight: (!) 148 kg (327 lb 1.6 oz)   Height: 195.6 cm (77\")      Body mass index is 38.79 " "kg/m².  Neck Circumference: 18 inches      REVIEW OF SYSTEMS.  Full review of systems available on the intake form which is scanned in the media tab.  The relevant positive are noted below  1. Daytime excessive sleepiness with [place] Sleepiness Scale :Total score: 0   2. Snoring  3. Frequent urination irregular heartbeat      Physical exam:  Vitals:    08/30/22 1100   Pulse: 87   SpO2: 97%   Weight: (!) 148 kg (327 lb 1.6 oz)   Height: 195.6 cm (77\")    Body mass index is 38.79 kg/m². Neck Circumference: 18 inches  HEENT: Head is atraumatic, normocephalic  Eyes: pupils are round equal and reacting to light and accommodation, conjunctiva normal  NECK:Neck Circumference: 18 inches  RESPIRATORY SYSTEM: Regular respirations  CARDIOVASULAR SYSTEM: Regular rate  EXTREMITES: No cyanosis, clubbing  NEUROLOGICAL SYSTEM: Oriented x 3, no gross motor defects, gait normal      Impression:  1. Sleep apnea, unspecified type    2. Hypersomnia    3. Non-restorative sleep    4. Obesity (BMI 30-39.9)    5. Paroxysmal atrial fibrillation        Plan:    Good sleep hygiene measures should be maintained.  Weight loss would be beneficial in this patient who is obese BMI 38.8.    I discussed the pathophysiology of obstructive sleep apnea with the patient.  We discussed the adverse outcomes associated with untreated sleep-disordered breathing.  We discussed treatment modalities of obstructive sleep apnea including CPAP device as well as oral mandibular advancement device. Sleep study will be scheduled to establish definitive diagnosis of sleep disorder breathing.  Weight loss will be strongly beneficial in order to reduce the severity of sleep-disordered breathing.  Patient has narrow oropharyngeal structure.  Caution during activities that require prolonged concentration is strongly advised.  Patient will be notified of sleep study results after sleep study is completed.  If sleep apnea is only mild,  oral mandibular advancement device " may be one of the treatment options.  However if sleep apnea is moderately severe, CPAP treatment will be strongly encouraged.  The patient is not opposed to treatment with CPAP device if we confirm significant obstructive sleep apnea on polysomnography.     Thank you for allowing me to participate in your patient's care.    David Denis MD  Sleep Medicine  08/30/22  11:36 EDT

## 2022-09-06 ENCOUNTER — CLINICAL SUPPORT (OUTPATIENT)
Dept: CARDIOLOGY | Facility: CLINIC | Age: 37
End: 2022-09-06

## 2022-09-06 DIAGNOSIS — I48.0 PAROXYSMAL ATRIAL FIBRILLATION: Primary | ICD-10-CM

## 2022-09-06 PROCEDURE — 93000 ELECTROCARDIOGRAM COMPLETE: CPT | Performed by: NURSE PRACTITIONER

## 2022-09-06 NOTE — PROGRESS NOTES
Procedure     ECG 12 Lead    Date/Time: 9/6/2022 8:24 AM  Performed by: Samia Veliz APRN  Authorized by: Samia Veliz APRN   Comparison: compared with previous ECG   Similar to previous ECG  Rhythm: sinus rhythm  Rate: normal  BPM: 72  Conduction: conduction normal  Conduction comments: JUDI [id number]  ST Segments: ST segments normal  T Waves: T waves normal  QRS axis: normal  Other: no other findings    Clinical impression: normal ECG

## 2022-09-14 ENCOUNTER — TELEPHONE (OUTPATIENT)
Dept: CARDIOLOGY | Facility: CLINIC | Age: 37
End: 2022-09-14

## 2022-09-14 NOTE — TELEPHONE ENCOUNTER
Received VM from patient    ARACELIS patient. Patient with question if he needed to start taking the Sotalol since he has felt fine for the last 2 days of not taking flecainide. Advised he needed to go ahead and start sotalol due to afib to help with rate control. Patient voiced understanding.

## 2022-09-19 ENCOUNTER — OFFICE VISIT (OUTPATIENT)
Dept: CARDIOLOGY | Facility: CLINIC | Age: 37
End: 2022-09-19

## 2022-09-19 VITALS
BODY MASS INDEX: 37.19 KG/M2 | DIASTOLIC BLOOD PRESSURE: 82 MMHG | SYSTOLIC BLOOD PRESSURE: 138 MMHG | HEIGHT: 77 IN | HEART RATE: 94 BPM | WEIGHT: 315 LBS

## 2022-09-19 DIAGNOSIS — I48.0 PAROXYSMAL ATRIAL FIBRILLATION: Primary | ICD-10-CM

## 2022-09-19 PROCEDURE — 93000 ELECTROCARDIOGRAM COMPLETE: CPT | Performed by: NURSE PRACTITIONER

## 2022-09-19 PROCEDURE — 99213 OFFICE O/P EST LOW 20 MIN: CPT | Performed by: NURSE PRACTITIONER

## 2022-09-19 RX ORDER — FLECAINIDE ACETATE 100 MG/1
100 TABLET ORAL DAILY PRN
Qty: 90 TABLET | Refills: 1 | Status: SHIPPED | OUTPATIENT
Start: 2022-09-19

## 2022-09-19 NOTE — PROGRESS NOTES
Chief Complaint  Atrial Fibrillation, Hypertension, and Hyperlipidemia    Subjective            History of Present Illness  Vance Lorenzo is a 37-year-old white/ male patient who presents to the office today for follow-up on his atrial fibrillation.  He had previously been prescribed flecainide to treat the atrial fibrillation however he developed tinnitus as an adverse effect.  We tried switching him to sotalol but he again developed tinnitus which was affecting his daily quality of life.  Over the last 3 to 4 days he has only been taking diltiazem and he denies any recurrent atrial fibrillation symptoms.  He would like to stay on the diltiazem only as long as his symptoms do not return.    PMH  Past Medical History:   Diagnosis Date   • Essential hypertension 12/1/2021   • Foot pain, bilateral 08/17/2018   • Hyperlipidemia LDL goal <100 12/1/2021   • Paroxysmal atrial fibrillation 12/1/2021   • Tinnitus    • Xerosis of skin 08/17/2018         ALLERGY  Allergies   Allergen Reactions   • Metoprolol Tinnitus          SURGICALHX  History reviewed. No pertinent surgical history.       SOC  Social History     Socioeconomic History   • Marital status: Single   Tobacco Use   • Smoking status: Never Smoker   • Smokeless tobacco: Never Used   Vaping Use   • Vaping Use: Never used   Substance and Sexual Activity   • Alcohol use: Never   • Drug use: Never   • Sexual activity: Defer         FAMHX  Family History   Problem Relation Age of Onset   • Breast cancer Maternal Grandmother         50s   • Cancer Paternal Grandmother    • Cancer Paternal Grandfather    • Heart failure Paternal Grandfather           MEDSIGONLY  Current Outpatient Medications on File Prior to Visit   Medication Sig   • dilTIAZem XR (DILACOR XR) 240 MG 24 hr capsule Take 1 capsule by mouth Daily.   • Eliquis 5 MG tablet tablet Take 1 tablet by mouth 2 (Two) Times a Day.   • hydroCHLOROthiazide (HYDRODIURIL) 25 MG tablet Take 1 tablet by  "mouth Daily.   • loteprednol (LOTEMAX) 0.5 % ophthalmic suspension INSTILL 1 DROP IN EACH EYE FOUR TIMES DAILY   • Xiidra 5 % ophthalmic solution INSTILL 1 DROP IN EACH EYE TWICE DAILY   • [DISCONTINUED] Sotalol HCl AF 80 MG tablet Take 1 tablet by mouth 2 (Two) Times a Day.     No current facility-administered medications on file prior to visit.         Objective   /82   Pulse 94   Ht 195.6 cm (77\")   Wt (!) 147 kg (323 lb)   BMI 38.30 kg/m²       Physical Exam  HENT:      Head: Normocephalic.   Neck:      Vascular: No carotid bruit.   Cardiovascular:      Rate and Rhythm: Normal rate and regular rhythm.      Pulses: Normal pulses.      Heart sounds: Normal heart sounds. No murmur heard.  Pulmonary:      Effort: Pulmonary effort is normal.      Breath sounds: Normal breath sounds.   Musculoskeletal:      Cervical back: Neck supple.      Right lower leg: No edema.      Left lower leg: No edema.   Skin:     General: Skin is dry.      Capillary Refill: Capillary refill takes less than 2 seconds.   Neurological:      Mental Status: He is alert and oriented to person, place, and time.   Psychiatric:         Behavior: Behavior normal.       ECG 12 Lead    Date/Time: 9/19/2022 8:32 AM  Performed by: Samia Veliz APRN  Authorized by: Samia Veliz APRN   Comparison: compared with previous ECG   Rhythm: sinus rhythm  Rate: normal  BPM: 79  Conduction: conduction normal  ST Segments: ST segments normal  T Waves: T waves normal  Other: no other findings    Clinical impression: normal ECG          Result Review :   The following data was reviewed by: MARSHA Crespo on 09/19/2022:  proBNP   Date Value Ref Range Status   07/27/2022 21.5 0.0 - 450.0 pg/mL Final     CMP    CMP 2/1/22 4/27/22 5/19/22   Glucose 86 96    BUN 8 10    Creatinine 0.79 0.91    eGFR Non African Am 112     Sodium 141 139    Potassium 4.2 4.6    Chloride 102 102    Calcium 10.0 9.7    Albumin 4.70 4.70 4.50   Total " Bilirubin 1.3 (A) 0.9 0.8   Alkaline Phosphatase 66 58 58   AST (SGOT) 18 20 15   ALT (SGPT) 28 17 17   (A) Abnormal value            CBC w/diff    CBC w/Diff 1/15/22 2/1/22 5/19/22   WBC 7.92 6.26 5.84   RBC 5.37 5.15 5.36   Hemoglobin 17.1 16.2 16.6   Hematocrit 47.1 46.8 49.4   MCV 87.7 90.9 92.2   MCH 31.8 31.5 31.0   MCHC 36.3 (A) 34.6 33.6   RDW 12.1 (A) 11.9 (A) 12.6   Platelets 267 223 223   Neutrophil Rel % 51.8 60.6 53.4   Immature Granulocyte Rel % 0.1  0.2   Lymphocyte Rel % 40.2 31.6 37.5   Monocyte Rel % 6.4 6.2 7.0   Eosinophil Rel % 1.0 0.8 1.0   Basophil Rel % 0.5 0.6 0.9   (A) Abnormal value             Lab Results   Component Value Date    TSH 1.350 02/01/2022      Lab Results   Component Value Date    FREET4 1.1 02/11/2019      No results found for: DDIMERQUANT  Magnesium   Date Value Ref Range Status   01/15/2022 2.0 1.6 - 2.6 mg/dL Final      No results found for: DIGOXIN   No results found for: TROPONINT        Lipid Panel    Lipid Panel 2/1/22 4/27/22 5/19/22   Total Cholesterol 148 220 (A) 203 (A)   Triglycerides 119 137 126   HDL Cholesterol 37 (A) 35 (A) 42   VLDL Cholesterol 22 25 23   LDL Cholesterol  89 160 (A) 138 (A)   LDL/HDL Ratio 2.36 4.50 3.23   (A) Abnormal value                 Assessment and Plan    Diagnoses and all orders for this visit:    1. Paroxysmal atrial fibrillation (Primary)  EKG in office today shows sinus rhythm with heart rate of 79 bpm.  Continue diltiazem 240 mg daily.  Continue Eliquis for CVA prevention.  We will change his flecainide to once daily as needed for palpitations and/or heart rate greater than 100 bpm.  He is agreeable with this treatment plan and will notify office if symptoms progress or worsen.      Other orders  -     flecainide (TAMBOCOR) 100 MG tablet; Take 1 tablet by mouth Daily As Needed (for palpitations and/or heart rate greater than 100 bpm).  Dispense: 90 tablet; Refill: 1        Follow Up   Return for Follow up as  scheduled.    Patient was given instructions and counseling regarding his condition or for health maintenance advice. Please see specific information pulled into the AVS if appropriate.     Vance Lorenzo  reports that he has never smoked. He has never used smokeless tobacco.           Samia Veliz, APRN  09/19/22  08:57 EDT    Dictated Utilizing Dragon Dictation

## 2022-09-20 ENCOUNTER — HOSPITAL ENCOUNTER (OUTPATIENT)
Dept: SLEEP MEDICINE | Facility: HOSPITAL | Age: 37
Discharge: HOME | End: 2022-09-20
Admitting: FAMILY MEDICINE

## 2022-09-20 DIAGNOSIS — E66.9 OBESITY (BMI 30-39.9): ICD-10-CM

## 2022-09-20 DIAGNOSIS — I48.0 PAROXYSMAL ATRIAL FIBRILLATION: ICD-10-CM

## 2022-09-20 DIAGNOSIS — G47.30 SLEEP APNEA, UNSPECIFIED TYPE: ICD-10-CM

## 2022-09-20 DIAGNOSIS — G47.8 NON-RESTORATIVE SLEEP: ICD-10-CM

## 2022-09-20 DIAGNOSIS — G47.10 HYPERSOMNIA: ICD-10-CM

## 2022-09-20 PROCEDURE — 95806 SLEEP STUDY UNATT&RESP EFFT: CPT

## 2022-09-22 DIAGNOSIS — E66.9 OBESITY (BMI 30-39.9): ICD-10-CM

## 2022-09-22 DIAGNOSIS — G47.8 NON-RESTORATIVE SLEEP: ICD-10-CM

## 2022-09-22 DIAGNOSIS — G47.10 HYPERSOMNIA: ICD-10-CM

## 2022-09-22 DIAGNOSIS — G47.30 SLEEP APNEA, UNSPECIFIED TYPE: Primary | ICD-10-CM

## 2022-09-22 DIAGNOSIS — I48.0 PAROXYSMAL ATRIAL FIBRILLATION: ICD-10-CM

## 2022-11-02 ENCOUNTER — TELEPHONE (OUTPATIENT)
Dept: INTERNAL MEDICINE | Facility: CLINIC | Age: 37
End: 2022-11-02

## 2022-11-02 NOTE — TELEPHONE ENCOUNTER
PT MOTHER CALLED IN THROUGH THE HUB WANTING A MEDICATION FOR THE COLD SORES THAT THE PATIENT IS HAVING. THEY ARE ON/AROUND MOUTH AND SPREADING.

## 2022-11-04 RX ORDER — ACYCLOVIR 50 MG/G
1 OINTMENT TOPICAL EVERY 4 HOURS
Qty: 30 G | Refills: 0 | Status: SHIPPED | OUTPATIENT
Start: 2022-11-04

## 2022-11-07 ENCOUNTER — OFFICE VISIT (OUTPATIENT)
Dept: INTERNAL MEDICINE | Facility: CLINIC | Age: 37
End: 2022-11-07

## 2022-11-07 VITALS
SYSTOLIC BLOOD PRESSURE: 144 MMHG | BODY MASS INDEX: 37.19 KG/M2 | TEMPERATURE: 97.8 F | WEIGHT: 315 LBS | OXYGEN SATURATION: 96 % | DIASTOLIC BLOOD PRESSURE: 90 MMHG | HEART RATE: 78 BPM | HEIGHT: 77 IN

## 2022-11-07 DIAGNOSIS — R06.2 WHEEZING: ICD-10-CM

## 2022-11-07 DIAGNOSIS — I48.0 PAROXYSMAL ATRIAL FIBRILLATION: ICD-10-CM

## 2022-11-07 DIAGNOSIS — Z23 NEED FOR INFLUENZA VACCINATION: ICD-10-CM

## 2022-11-07 DIAGNOSIS — I10 ESSENTIAL HYPERTENSION: Primary | ICD-10-CM

## 2022-11-07 PROCEDURE — 99214 OFFICE O/P EST MOD 30 MIN: CPT | Performed by: INTERNAL MEDICINE

## 2022-11-07 RX ORDER — PREDNISONE 20 MG/1
20 TABLET ORAL DAILY
Qty: 5 TABLET | Refills: 0 | Status: SHIPPED | OUTPATIENT
Start: 2022-11-07 | End: 2022-11-12

## 2022-11-07 NOTE — PROGRESS NOTES
"Chief Complaint  Hyperlipidemia (Follow up), Hypertension (Pt logs bp at home ), and Atrial Fibrillation (Follow up)    Subjective          Vance Lorenzo presents to [facility]  History of Present Illness  Patient reports pitted keratolysis previously. This has improved after antibiotic.   hypertension- patient reports having Has recently. Patient reports home Blood Pressure readings 120-130/80-90. Patient ha snot taken HCTZ while ill for the past week.   afib- patient is on flecainide as needed at this time. Patient reports taking x3 in last 2 months. Continue to follow-up with Dr. Castellano. Patient is now on diltiazem as needed.   Patient denies history of wheezing. No history of asthma or inhaler use.     Current Outpatient Medications   Medication Instructions   • acyclovir (Zovirax) 5 % ointment 1 application, Topical, Every 4 Hours   • dilTIAZem XR (DILACOR XR) 240 mg, Oral, Daily   • Eliquis 5 mg, Oral, 2 Times Daily   • flecainide (TAMBOCOR) 100 mg, Oral, Daily PRN   • hydroCHLOROthiazide (HYDRODIURIL) 25 mg, Oral, Daily   • loteprednol (LOTEMAX) 0.5 % ophthalmic suspension INSTILL 1 DROP IN EACH EYE FOUR TIMES DAILY   • predniSONE (DELTASONE) 20 mg, Oral, Daily   • Xiidra 5 % ophthalmic solution INSTILL 1 DROP IN EACH EYE TWICE DAILY       The following portions of the patient's history were reviewed and updated as appropriate: allergies, current medications, past family history, past medical history, past social history, past surgical history, and problem list.    Objective   Vital Signs:   /90 (BP Location: Right arm, Patient Position: Sitting, Cuff Size: Large Adult)   Pulse 78   Temp 97.8 °F (36.6 °C) (Temporal)   Ht 195.6 cm (77\")   Wt (!) 145 kg (320 lb 1.6 oz)   SpO2 96%   BMI 37.96 kg/m²     Wt Readings from Last 3 Encounters:   11/07/22 (!) 145 kg (320 lb 1.6 oz)   09/19/22 (!) 147 kg (323 lb)   08/30/22 (!) 148 kg (327 lb 1.6 oz)     BP " Readings from Last 3 Encounters:   11/07/22 144/90   09/19/22 138/82   08/01/22 159/98     Physical Exam   Appearance: No acute distress, well-nourished  Head: normocephalic, atraumatic  Eyes: extraocular movements intact, no scleral icterus, no conjunctival injection  Ears, Nose, and Throat: external ears normal, nares patent, moist mucous membranes  Cardiovascular: regular rate and rhythm. no murmurs, rubs, or gallops. no edema  Respiratory: breathing comfortably, symmetric chest rise,  Wheezing throughout mabel. No rhonchi or rales.   Neuro: alert and oriented to time, place, and person. Normal gait  Psych: normal mood and affect     Result Review :   The following data was reviewed by: Onesimo Thayer Jr, MD on 11/07/2022:  Common labs    Common Labs 2/1/22 2/1/22 2/1/22 2/1/22 4/27/22 4/27/22 5/19/22 5/19/22 5/19/22    1011 1011 1011 1011 1041 1041 0751 0751 0751   Glucose    86 96       BUN    8 10       Creatinine    0.79 0.91       eGFR Non African Am    112        Sodium    141 139       Potassium    4.2 4.6       Chloride    102 102       Calcium    10.0 9.7       Albumin    4.70 4.70    4.50   Total Bilirubin    1.3 (A) 0.9    0.8   Alkaline Phosphatase    66 58    58   AST (SGOT)    18 20    15   ALT (SGPT)    28 17    17   WBC 6.26      5.84     Hemoglobin 16.2      16.6     Hematocrit 46.8      49.4     Platelets 223      223     Total Cholesterol   148   220 (A)  203 (A)    Triglycerides   119   137  126    HDL Cholesterol   37 (A)   35 (A)  42    LDL Cholesterol    89   160 (A)  138 (A)    Hemoglobin A1C  5.20          (A) Abnormal value              Lab Results   Component Value Date    BILIRUBINUR Negative 02/01/2022        Assessment and Plan    Diagnoses and all orders for this visit:    1. Essential hypertension (Primary)  Comments:  elevated today. encouraged to restart HCTZ. monitor.     2. Paroxysmal atrial fibrillation  Comments:  cont ditiazem. cards notes reviewed. cont flecainide as  needed.     3. Need for influenza vaccination  Comments:  defer today due to wheezing.     4. Wheezing  Comments:  new to patient. maybe related to illness. will obtain CXR to eval further and Rx short course of steroids.   Orders:  -     XR Chest PA & Lateral; Future  -     predniSONE (DELTASONE) 20 MG tablet; Take 1 tablet by mouth Daily for 5 days.  Dispense: 5 tablet; Refill: 0        There are no discontinued medications.     Follow Up   Return in about 6 months (around 5/7/2023) for Recheck, HTN.  Patient was given instructions and counseling regarding his condition or for health maintenance advice. Please see specific information pulled into the AVS if appropriate.       Onesimo Thayer Jr, MD  11/07/22  17:11 EST

## 2022-11-09 ENCOUNTER — HOSPITAL ENCOUNTER (OUTPATIENT)
Dept: SLEEP MEDICINE | Facility: HOSPITAL | Age: 37
Discharge: HOME | End: 2022-11-09
Admitting: FAMILY MEDICINE

## 2022-11-09 ENCOUNTER — HOSPITAL ENCOUNTER (OUTPATIENT)
Dept: GENERAL RADIOLOGY | Facility: HOSPITAL | Age: 37
Discharge: HOME | End: 2022-11-09

## 2022-11-09 DIAGNOSIS — I48.0 PAROXYSMAL ATRIAL FIBRILLATION: ICD-10-CM

## 2022-11-09 DIAGNOSIS — G47.10 HYPERSOMNIA: ICD-10-CM

## 2022-11-09 DIAGNOSIS — R06.2 WHEEZING: ICD-10-CM

## 2022-11-09 DIAGNOSIS — G47.30 SLEEP APNEA, UNSPECIFIED TYPE: ICD-10-CM

## 2022-11-09 DIAGNOSIS — G47.8 NON-RESTORATIVE SLEEP: ICD-10-CM

## 2022-11-09 DIAGNOSIS — E66.9 OBESITY (BMI 30-39.9): ICD-10-CM

## 2022-11-09 PROCEDURE — 95806 SLEEP STUDY UNATT&RESP EFFT: CPT | Performed by: FAMILY MEDICINE

## 2022-11-09 PROCEDURE — 71046 X-RAY EXAM CHEST 2 VIEWS: CPT

## 2022-11-09 PROCEDURE — 95806 SLEEP STUDY UNATT&RESP EFFT: CPT

## 2022-11-30 RX ORDER — APIXABAN 5 MG/1
TABLET, FILM COATED ORAL
Qty: 60 TABLET | Refills: 0 | Status: SHIPPED | OUTPATIENT
Start: 2022-11-30 | End: 2022-12-28

## 2022-12-01 DIAGNOSIS — G47.8 NON-RESTORATIVE SLEEP: ICD-10-CM

## 2022-12-01 DIAGNOSIS — G47.33 OBSTRUCTIVE SLEEP APNEA: Primary | ICD-10-CM

## 2022-12-01 DIAGNOSIS — E66.9 OBESITY (BMI 30-39.9): ICD-10-CM

## 2022-12-01 DIAGNOSIS — G47.10 HYPERSOMNIA: ICD-10-CM

## 2022-12-01 DIAGNOSIS — I48.0 PAROXYSMAL ATRIAL FIBRILLATION: ICD-10-CM

## 2022-12-02 ENCOUNTER — TELEPHONE (OUTPATIENT)
Dept: SLEEP MEDICINE | Facility: HOSPITAL | Age: 37
End: 2022-12-02

## 2022-12-05 ENCOUNTER — TELEPHONE (OUTPATIENT)
Dept: SLEEP MEDICINE | Facility: HOSPITAL | Age: 37
End: 2022-12-05

## 2022-12-09 ENCOUNTER — OFFICE VISIT (OUTPATIENT)
Dept: CARDIOLOGY | Facility: CLINIC | Age: 37
End: 2022-12-09

## 2022-12-09 VITALS
HEART RATE: 74 BPM | DIASTOLIC BLOOD PRESSURE: 89 MMHG | WEIGHT: 315 LBS | HEIGHT: 77 IN | BODY MASS INDEX: 37.19 KG/M2 | SYSTOLIC BLOOD PRESSURE: 162 MMHG

## 2022-12-09 DIAGNOSIS — I48.0 PAROXYSMAL ATRIAL FIBRILLATION: Primary | ICD-10-CM

## 2022-12-09 DIAGNOSIS — I10 ESSENTIAL HYPERTENSION: ICD-10-CM

## 2022-12-09 DIAGNOSIS — E78.5 HYPERLIPIDEMIA LDL GOAL <100: ICD-10-CM

## 2022-12-09 PROCEDURE — 93000 ELECTROCARDIOGRAM COMPLETE: CPT | Performed by: NURSE PRACTITIONER

## 2022-12-09 PROCEDURE — 99214 OFFICE O/P EST MOD 30 MIN: CPT | Performed by: NURSE PRACTITIONER

## 2022-12-09 NOTE — PROGRESS NOTES
"Chief Complaint  Hypertension and Hyperlipidemia    Subjective            History of Present Illness  Vance Lorenzo is a 37-year-old white/ male patient who presents to the office today for follow-up.  He has paroxysmal atrial fibrillation, hypertension, and hyperlipidemia.  He reports that he has been checking his blood pressure and heart rate daily at home with normal ranges: \"120/80's\" and \"70's\".  He is now taking the flecainide as needed and only takes half a tablet whenever he has symptoms of atrial fibrillation.  He reports that in the last 3 months he has only needed to take the flecainide 6 times and was able to have resolution of symptoms when he took the medication.  He denies any chest pain, shortness of breath, lightheadedness/dizziness, or edema.    [facility]  Past Medical History:   Diagnosis Date   • Essential hypertension 12/1/2021   • Foot pain, bilateral 08/17/2018   • Hyperlipidemia LDL goal <100 12/1/2021   • Paroxysmal atrial fibrillation 12/1/2021   • Tinnitus    • Xerosis of skin 08/17/2018         ALLERGY  Allergies   Allergen Reactions   • Metoprolol Tinnitus          SURGICALHX  History reviewed. No pertinent surgical history.       SOC  Social History     Socioeconomic History   • Marital status: Single   Tobacco Use   • Smoking status: Never   • Smokeless tobacco: Never   Vaping Use   • Vaping Use: Never used   Substance and Sexual Activity   • Alcohol use: Never   • Drug use: Never   • Sexual activity: Defer         FAMHX  Family History   Problem Relation Age of Onset   • Breast cancer Maternal Grandmother         50s   • Cancer Paternal Grandmother    • Cancer Paternal Grandfather    • Heart failure Paternal Grandfather           MEDSIGONLY  Current Outpatient Medications on File Prior to Visit   Medication Sig   • dilTIAZem XR (DILACOR XR) 240 MG 24 hr capsule Take 1 capsule by mouth Daily.   • Eliquis 5 MG tablet tablet TAKE ONE TABLET BY MOUTH TWICE DAILY   • flecainide " "(TAMBOCOR) 100 MG tablet Take 1 tablet by mouth Daily As Needed (for palpitations and/or heart rate greater than 100 bpm).   • hydroCHLOROthiazide (HYDRODIURIL) 25 MG tablet Take 1 tablet by mouth Daily.   • loteprednol (LOTEMAX) 0.5 % ophthalmic suspension INSTILL 1 DROP IN EACH EYE FOUR TIMES DAILY   • Xiidra 5 % ophthalmic solution INSTILL 1 DROP IN EACH EYE TWICE DAILY   • acyclovir (Zovirax) 5 % ointment Apply 1 application topically to the appropriate area as directed Every 4 (Four) Hours.     No current facility-administered medications on file prior to visit.         Objective   /89   Pulse 74   Ht 195.6 cm (77\")   Wt (!) 146 kg (322 lb)   BMI 38.18 kg/m²       Physical Exam  Constitutional:       Appearance: He is obese.   HENT:      Head: Normocephalic.   Neck:      Vascular: No carotid bruit.   Cardiovascular:      Rate and Rhythm: Tachycardia present. Rhythm irregular.      Pulses: Normal pulses.      Heart sounds: Normal heart sounds. No murmur heard.  Pulmonary:      Effort: Pulmonary effort is normal.      Breath sounds: Normal breath sounds.   Musculoskeletal:      Cervical back: Neck supple.      Right lower leg: No edema.      Left lower leg: No edema.   Skin:     General: Skin is dry.      Capillary Refill: Capillary refill takes less than 2 seconds.   Neurological:      Mental Status: He is alert and oriented to person, place, and time.   Psychiatric:         Behavior: Behavior normal.       ECG 12 Lead    Date/Time: 12/9/2022 12:35 PM  Performed by: Samia Veliz APRN  Authorized by: Samia Veliz APRN   Comparison: compared with previous ECG from 9/19/2022  Comparison to previous ECG: Was in sinus rhythm now in atrial fibrillation  Rhythm: atrial fibrillation  Rate: tachycardic  BPM: 104  Conduction: conduction normal  ST Segments: ST segments normal  T Waves: T waves normal  Other: no other findings    Clinical impression: abnormal EKG          Result Review :   The " following data was reviewed by: MARSHA Crespo on 12/09/2022:  proBNP   Date Value Ref Range Status   07/27/2022 21.5 0.0 - 450.0 pg/mL Final     CMP    CMP 4/27/22 5/19/22   Glucose 96    BUN 10    Creatinine 0.91    eGFR Non African Am     Sodium 139    Potassium 4.6    Chloride 102    Calcium 9.7    Albumin 4.70 4.50   Total Bilirubin 0.9 0.8   Alkaline Phosphatase 58 58   AST (SGOT) 20 15   ALT (SGPT) 17 17   (A) Abnormal value            CBC w/diff    CBC w/Diff 5/19/22   WBC 5.84   RBC 5.36   Hemoglobin 16.6   Hematocrit 49.4   MCV 92.2   MCH 31.0   MCHC 33.6   RDW 12.6   Platelets 223   Neutrophil Rel % 53.4   Immature Granulocyte Rel % 0.2   Lymphocyte Rel % 37.5   Monocyte Rel % 7.0   Eosinophil Rel % 1.0   Basophil Rel % 0.9   (A) Abnormal value             Lab Results   Component Value Date    TSH 1.350 02/01/2022      Lab Results   Component Value Date    FREET4 1.1 02/11/2019      No results found for: DDIMERQUANT  Magnesium   Date Value Ref Range Status   01/15/2022 2.0 1.6 - 2.6 mg/dL Final      No results found for: DIGOXIN   No results found for: TROPONINT        Lipid Panel    Lipid Panel 5/19/22   Total Cholesterol 203 (A)   Triglycerides 126   HDL Cholesterol 42   VLDL Cholesterol 23   LDL Cholesterol  138 (A)   LDL/HDL Ratio 3.23   (A) Abnormal value            [id number] Score: 1        Assessment and Plan    Diagnoses and all orders for this visit:    1. Paroxysmal atrial fibrillation (Primary)  He reports that he is symptomatically stable.  EKG in office today shows atrial fibrillation with rate of 104.  He states that he can feel that he is in atrial fibrillation right now.  He plans to take flecainide when he gets home.  We talked about taking the flecainide on a more regular basis or may be at a lower dose.  He is not interested in doing that since he feels good most of the time.  Continue diltiazem 240 mg daily and Eliquis 5 mg twice daily for CVA prevention.    2. Essential  hypertension  Elevated in office today, he reports normal blood pressures at home.  I asked him to bring a log with him next time. Check blood pressure twice a day for the next two weeks, blood pressure log provided for patient.  For now continue hydrochlorothiazide 25 mg daily.  Low-sodium diet discussed.    3. Hyperlipidemia LDL goal <100  Last lipid panel was 5/19/2022 with LDL of 138 which is outside of his goal range.  We talked about ways to modify diet to lower the lipid panel.  This will need to be checked before his next visit.    Other orders  -     ECG 12 Lead            Follow Up   Return in about 6 months (around 6/9/2023) for Follow up with Dr Castellano.    Patient was given instructions and counseling regarding his condition or for health maintenance advice. Please see specific information pulled into the AVS if appropriate.     Vance Lorenzo  reports that he has never smoked. He has never used smokeless tobacco..           Samia Veliz, APRN  12/09/22  11:52 EST    Dictated Utilizing Dragon Dictation

## 2022-12-26 ENCOUNTER — HOSPITAL ENCOUNTER (EMERGENCY)
Facility: HOSPITAL | Age: 37
Discharge: HOME | End: 2022-12-26
Attending: EMERGENCY MEDICINE | Admitting: EMERGENCY MEDICINE

## 2022-12-26 ENCOUNTER — APPOINTMENT (OUTPATIENT)
Dept: CT IMAGING | Facility: HOSPITAL | Age: 37
End: 2022-12-26

## 2022-12-26 VITALS
WEIGHT: 315 LBS | BODY MASS INDEX: 37.19 KG/M2 | HEART RATE: 97 BPM | HEIGHT: 77 IN | OXYGEN SATURATION: 96 % | TEMPERATURE: 98.4 F | SYSTOLIC BLOOD PRESSURE: 141 MMHG | RESPIRATION RATE: 18 BRPM | DIASTOLIC BLOOD PRESSURE: 94 MMHG

## 2022-12-26 DIAGNOSIS — R31.0 GROSS HEMATURIA: Primary | ICD-10-CM

## 2022-12-26 DIAGNOSIS — N20.1 URETEROLITHIASIS: ICD-10-CM

## 2022-12-26 LAB
ALBUMIN SERPL-MCNC: 4.6 G/DL (ref 3.5–5.2)
ALBUMIN/GLOB SERPL: 1.7 G/DL
ALP SERPL-CCNC: 67 U/L (ref 39–117)
ALT SERPL W P-5'-P-CCNC: 19 U/L (ref 1–41)
ANION GAP SERPL CALCULATED.3IONS-SCNC: 10.3 MMOL/L (ref 5–15)
APTT PPP: 37.4 SECONDS (ref 24.2–34.2)
AST SERPL-CCNC: 16 U/L (ref 1–40)
BACTERIA UR QL AUTO: ABNORMAL /HPF
BASOPHILS # BLD AUTO: 0.04 10*3/MM3 (ref 0–0.2)
BASOPHILS NFR BLD AUTO: 0.5 % (ref 0–1.5)
BILIRUB SERPL-MCNC: 0.8 MG/DL (ref 0–1.2)
BILIRUB UR QL STRIP: NEGATIVE
BUN SERPL-MCNC: 8 MG/DL (ref 6–20)
BUN/CREAT SERPL: 9.8 (ref 7–25)
CALCIUM SPEC-SCNC: 9.6 MG/DL (ref 8.6–10.5)
CHLORIDE SERPL-SCNC: 100 MMOL/L (ref 98–107)
CLARITY UR: ABNORMAL
CO2 SERPL-SCNC: 27.7 MMOL/L (ref 22–29)
COLOR UR: ABNORMAL
CREAT SERPL-MCNC: 0.82 MG/DL (ref 0.76–1.27)
DEPRECATED RDW RBC AUTO: 40 FL (ref 37–54)
EGFRCR SERPLBLD CKD-EPI 2021: 117.5 ML/MIN/1.73
EOSINOPHIL # BLD AUTO: 0.03 10*3/MM3 (ref 0–0.4)
EOSINOPHIL NFR BLD AUTO: 0.4 % (ref 0.3–6.2)
ERYTHROCYTE [DISTWIDTH] IN BLOOD BY AUTOMATED COUNT: 12.4 % (ref 12.3–15.4)
GLOBULIN UR ELPH-MCNC: 2.7 GM/DL
GLUCOSE SERPL-MCNC: 107 MG/DL (ref 65–99)
GLUCOSE UR STRIP-MCNC: NEGATIVE MG/DL
HCT VFR BLD AUTO: 45.5 % (ref 37.5–51)
HGB BLD-MCNC: 16.1 G/DL (ref 13–17.7)
HGB UR QL STRIP.AUTO: ABNORMAL
HOLD SPECIMEN: NORMAL
HOLD SPECIMEN: NORMAL
HYALINE CASTS UR QL AUTO: ABNORMAL /LPF
IMM GRANULOCYTES # BLD AUTO: 0.01 10*3/MM3 (ref 0–0.05)
IMM GRANULOCYTES NFR BLD AUTO: 0.1 % (ref 0–0.5)
INR PPP: 1.16 (ref 0.86–1.15)
KETONES UR QL STRIP: NEGATIVE
LEUKOCYTE ESTERASE UR QL STRIP.AUTO: ABNORMAL
LIPASE SERPL-CCNC: 28 U/L (ref 13–60)
LYMPHOCYTES # BLD AUTO: 1.66 10*3/MM3 (ref 0.7–3.1)
LYMPHOCYTES NFR BLD AUTO: 20.1 % (ref 19.6–45.3)
MCH RBC QN AUTO: 31.1 PG (ref 26.6–33)
MCHC RBC AUTO-ENTMCNC: 35.4 G/DL (ref 31.5–35.7)
MCV RBC AUTO: 87.8 FL (ref 79–97)
MONOCYTES # BLD AUTO: 0.54 10*3/MM3 (ref 0.1–0.9)
MONOCYTES NFR BLD AUTO: 6.5 % (ref 5–12)
NEUTROPHILS NFR BLD AUTO: 5.99 10*3/MM3 (ref 1.7–7)
NEUTROPHILS NFR BLD AUTO: 72.4 % (ref 42.7–76)
NITRITE UR QL STRIP: NEGATIVE
NRBC BLD AUTO-RTO: 0 /100 WBC (ref 0–0.2)
PH UR STRIP.AUTO: 7 [PH] (ref 5–8)
PLATELET # BLD AUTO: 230 10*3/MM3 (ref 140–450)
PMV BLD AUTO: 11.2 FL (ref 6–12)
POTASSIUM SERPL-SCNC: 3.8 MMOL/L (ref 3.5–5.2)
PROT SERPL-MCNC: 7.3 G/DL (ref 6–8.5)
PROT UR QL STRIP: ABNORMAL
PROTHROMBIN TIME: 14.9 SECONDS (ref 11.8–14.9)
RBC # BLD AUTO: 5.18 10*6/MM3 (ref 4.14–5.8)
RBC # UR STRIP: ABNORMAL /HPF
REF LAB TEST METHOD: ABNORMAL
SODIUM SERPL-SCNC: 138 MMOL/L (ref 136–145)
SP GR UR STRIP: 1.01 (ref 1–1.03)
SQUAMOUS #/AREA URNS HPF: ABNORMAL /HPF
UROBILINOGEN UR QL STRIP: ABNORMAL
WBC # UR STRIP: ABNORMAL /HPF
WBC NRBC COR # BLD: 8.27 10*3/MM3 (ref 3.4–10.8)
WHOLE BLOOD HOLD COAG: NORMAL
WHOLE BLOOD HOLD SPECIMEN: NORMAL

## 2022-12-26 PROCEDURE — 99283 EMERGENCY DEPT VISIT LOW MDM: CPT

## 2022-12-26 PROCEDURE — 74176 CT ABD & PELVIS W/O CONTRAST: CPT

## 2022-12-26 PROCEDURE — 85025 COMPLETE CBC W/AUTO DIFF WBC: CPT

## 2022-12-26 PROCEDURE — 80053 COMPREHEN METABOLIC PANEL: CPT | Performed by: EMERGENCY MEDICINE

## 2022-12-26 PROCEDURE — 85730 THROMBOPLASTIN TIME PARTIAL: CPT | Performed by: NURSE PRACTITIONER

## 2022-12-26 PROCEDURE — 83690 ASSAY OF LIPASE: CPT | Performed by: EMERGENCY MEDICINE

## 2022-12-26 PROCEDURE — 36415 COLL VENOUS BLD VENIPUNCTURE: CPT

## 2022-12-26 PROCEDURE — 81001 URINALYSIS AUTO W/SCOPE: CPT | Performed by: EMERGENCY MEDICINE

## 2022-12-26 PROCEDURE — 85610 PROTHROMBIN TIME: CPT | Performed by: NURSE PRACTITIONER

## 2022-12-26 RX ORDER — SODIUM CHLORIDE 0.9 % (FLUSH) 0.9 %
10 SYRINGE (ML) INJECTION AS NEEDED
Status: DISCONTINUED | OUTPATIENT
Start: 2022-12-26 | End: 2022-12-26 | Stop reason: HOSPADM

## 2022-12-26 NOTE — DISCHARGE INSTRUCTIONS
Increase your daily fluid intake.  Strain your urine for passage of stone.  Follow-up your primary care provider in 2 weeks if you do not like you passed the kidney stone.  Return to the ER for severe and uncontrollable pain, fever greater than 101, inability to urinate, or any other concerns or issues.   No

## 2022-12-26 NOTE — ED PROVIDER NOTES
Time: 11:25 AM EST  Chief Complaint:   Chief Complaint   Patient presents with   • Blood in Urine           History of Present Illness:  Patient is a 37 y.o. year old male who presents to the emergency department with complaints of hematuria since yesterday.  Patient states that urine is more red today than it was yesterday. He reports a history of kidney stones.  He states he felt a twinge of pain in his right flank yesterday, but denies any pain currently just hematuria.  Patient takes Eliquis 5 mg PO daily for this.          History provided by:  Patient   used: No            Patient Care Team  Primary Care Provider: Onesimo Thayer Jr., MD    Past Medical History:     Allergies   Allergen Reactions   • Metoprolol Tinnitus     Past Medical History:   Diagnosis Date   • Essential hypertension 12/1/2021   • Foot pain, bilateral 08/17/2018   • Hyperlipidemia LDL goal <100 12/1/2021   • Paroxysmal atrial fibrillation 12/1/2021   • Tinnitus    • Xerosis of skin 08/17/2018     History reviewed. No pertinent surgical history.  Family History   Problem Relation Age of Onset   • Breast cancer Maternal Grandmother         50s   • Cancer Paternal Grandmother    • Cancer Paternal Grandfather    • Heart failure Paternal Grandfather        Home Medications:  Prior to Admission medications    Medication Sig Start Date End Date Taking? Authorizing Provider   acyclovir (Zovirax) 5 % ointment Apply 1 application topically to the appropriate area as directed Every 4 (Four) Hours. 11/4/22   Onesimo Thayer Jr., MD   dilTIAZem XR (DILACOR XR) 240 MG 24 hr capsule Take 1 capsule by mouth Daily. 8/2/22   Onesimo Thayer Jr., MD   Eliquis 5 MG tablet tablet TAKE ONE TABLET BY MOUTH TWICE DAILY 11/30/22   Damien Castellano MD   flecainide (TAMBOCOR) 100 MG tablet Take 1 tablet by mouth Daily As Needed (for palpitations and/or heart rate greater than 100 bpm). 9/19/22   Samia Veliz APRN  "  hydroCHLOROthiazide (HYDRODIURIL) 25 MG tablet Take 1 tablet by mouth Daily. 8/2/22   Onesimo Thayer Jr., MD   loteprednol (LOTEMAX) 0.5 % ophthalmic suspension INSTILL 1 DROP IN EACH EYE FOUR TIMES DAILY 5/18/22   Roe Francois MD   Xiidra 5 % ophthalmic solution INSTILL 1 DROP IN EACH EYE TWICE DAILY 7/12/22   Provider, MD Roe        Social History:   Social History     Tobacco Use   • Smoking status: Never   • Smokeless tobacco: Never   Vaping Use   • Vaping Use: Never used   Substance Use Topics   • Alcohol use: Never   • Drug use: Never         Review of Systems:  Review of Systems   Constitutional: Negative for chills and fever.   HENT: Negative for congestion, ear pain and sore throat.    Eyes: Negative for pain.   Respiratory: Negative for cough, chest tightness and shortness of breath.    Cardiovascular: Negative for chest pain.   Gastrointestinal: Negative for abdominal pain, diarrhea, nausea and vomiting.   Genitourinary: Positive for flank pain and hematuria. Negative for testicular pain.   Musculoskeletal: Negative for joint swelling.   Skin: Negative for pallor.   Neurological: Negative for seizures and headaches.   All other systems reviewed and are negative.       Physical Exam:  /94 (BP Location: Right arm, Patient Position: Lying)   Pulse 97   Temp 98.4 °F (36.9 °C) (Oral)   Resp 18   Ht 195.6 cm (77\")   Wt (!) 146 kg (320 lb 15.8 oz)   SpO2 96%   BMI 38.06 kg/m²     Physical Exam  Vitals and nursing note reviewed.   Constitutional:       General: He is not in acute distress.     Appearance: Normal appearance. He is not toxic-appearing.   HENT:      Head: Normocephalic and atraumatic.      Mouth/Throat:      Mouth: Mucous membranes are moist.   Eyes:      General: No scleral icterus.     Extraocular Movements: Extraocular movements intact.      Conjunctiva/sclera: Conjunctivae normal.   Cardiovascular:      Rate and Rhythm: Normal rate and regular rhythm.     "  Pulses: Normal pulses.      Heart sounds: Normal heart sounds.   Pulmonary:      Effort: Pulmonary effort is normal. No respiratory distress.      Breath sounds: Normal breath sounds.   Abdominal:      General: Abdomen is flat. There is no distension.      Palpations: Abdomen is soft.      Tenderness: There is no abdominal tenderness.   Musculoskeletal:         General: Normal range of motion.      Cervical back: Normal range of motion and neck supple.   Skin:     General: Skin is warm and dry.      Coloration: Skin is not cyanotic.   Neurological:      Mental Status: He is alert and oriented to person, place, and time. Mental status is at baseline.   Psychiatric:         Attention and Perception: Attention and perception normal.         Mood and Affect: Mood normal.                Medications in the Emergency Department:  Medications - No data to display     Labs  Lab Results (last 24 hours)     ** No results found for the last 24 hours. **           Imaging:  No Radiology Exams Resulted Within Past 24 Hours    Procedures:  Procedures    Progress  ED Course as of 12/27/22 1943   Mon Dec 26, 2022   1124 --- PROVIDER IN TRIAGE NOTE ---    The patient was seen and evaluated by negra Fish in triage. Orders were placed and the patient is currently awaiting disposition. [KS]      ED Course User Index  [KS] Laura Fish APRN                            The patient was initially evaluated in the triage area where orders were placed. The patient was later dispositioned by James Suárez DO.      The patient was advised to stay for completion of workup which includes but is not limited to communication of labs and radiological results, reassessment and plan. The patient was advised that leaving prior to disposition by a provider could result in critical findings that are not communicated to the patient.     Medical Decision Making:  MDM   The patient was seen and evaluated in the ED by me.  The above history and  physical examination was performed as document.  Diagnostic data was obtained.  Results reviewed.  Discussed with the patient.  Patient's work-up does show evidence of a small ureteral lithiasis.  Patient has no signs of hydronephrosis nor acute pain.  Patient has gross hematuria most likely due to him being on Eliquis with the ureteral lithiasis.  Patient is here for discharge home with outpatient follow-up.  I did discuss all this with the patient.      The following orders were placed after triage and evaluation:  Orders Placed This Encounter   Procedures   • CT Abdomen Pelvis Without Contrast   • [place] Draw   • Comprehensive Metabolic Panel   • Lipase   • Urinalysis With Microscopic If Indicated (No Culture) - Urine, Clean Catch   • CBC Auto Differential   • Protime-INR   • aPTT   • Urinalysis, Microscopic Only - Urine, Clean Catch   • Undress & Gown   • CBC & Differential   • Green Top (Gel)   • Lavender Top   • Gold Top - SST   • Light Blue Top       Final diagnoses:   Gross hematuria   Ureterolithiasis          Disposition:  ED Disposition     ED Disposition   Discharge    Condition   Stable    Comment   --             This medical record created using voice recognition software.           Marisa Cai  12/26/22 1248       James Suárez DO  12/28/22 1116

## 2022-12-28 DIAGNOSIS — I10 ESSENTIAL HYPERTENSION: ICD-10-CM

## 2022-12-28 RX ORDER — HYDROCHLOROTHIAZIDE 25 MG/1
TABLET ORAL
Qty: 90 TABLET | Refills: 1 | Status: SHIPPED | OUTPATIENT
Start: 2022-12-28

## 2022-12-28 RX ORDER — APIXABAN 5 MG/1
TABLET, FILM COATED ORAL
Qty: 180 TABLET | Refills: 2 | Status: SHIPPED | OUTPATIENT
Start: 2022-12-28

## 2023-01-23 ENCOUNTER — OFFICE VISIT (OUTPATIENT)
Dept: INTERNAL MEDICINE | Facility: CLINIC | Age: 38
End: 2023-01-23
Payer: COMMERCIAL

## 2023-01-23 VITALS
HEIGHT: 77 IN | OXYGEN SATURATION: 98 % | SYSTOLIC BLOOD PRESSURE: 151 MMHG | TEMPERATURE: 97.9 F | DIASTOLIC BLOOD PRESSURE: 90 MMHG | HEART RATE: 99 BPM | WEIGHT: 315 LBS | BODY MASS INDEX: 37.19 KG/M2

## 2023-01-23 DIAGNOSIS — H66.001 NON-RECURRENT ACUTE SUPPURATIVE OTITIS MEDIA OF RIGHT EAR WITHOUT SPONTANEOUS RUPTURE OF TYMPANIC MEMBRANE: Primary | ICD-10-CM

## 2023-01-23 DIAGNOSIS — H93.11 TINNITUS OF RIGHT EAR: ICD-10-CM

## 2023-01-23 PROCEDURE — 99213 OFFICE O/P EST LOW 20 MIN: CPT

## 2023-01-23 RX ORDER — FLUTICASONE PROPIONATE 50 MCG
2 SPRAY, SUSPENSION (ML) NASAL DAILY
Qty: 16 G | Refills: 0 | Status: SHIPPED | OUTPATIENT
Start: 2023-01-23

## 2023-01-23 RX ORDER — AMOXICILLIN 500 MG/1
500 CAPSULE ORAL 2 TIMES DAILY
Qty: 10 CAPSULE | Refills: 0 | Status: SHIPPED | OUTPATIENT
Start: 2023-01-23 | End: 2023-01-28

## 2023-01-31 ENCOUNTER — TELEPHONE (OUTPATIENT)
Dept: SLEEP MEDICINE | Facility: HOSPITAL | Age: 38
End: 2023-01-31

## 2023-01-31 ENCOUNTER — OFFICE VISIT (OUTPATIENT)
Dept: SLEEP MEDICINE | Facility: HOSPITAL | Age: 38
End: 2023-01-31
Payer: COMMERCIAL

## 2023-01-31 DIAGNOSIS — G47.33 OBSTRUCTIVE SLEEP APNEA: Primary | ICD-10-CM

## 2023-01-31 PROCEDURE — 99214 OFFICE O/P EST MOD 30 MIN: CPT | Performed by: FAMILY MEDICINE

## 2023-01-31 NOTE — PROGRESS NOTES
Vance Lorenzo    1985  [id number]    This visit was completed via telephone.  All issues as below were discussed and addressed but no physical exam was performed.  If it was felt that the patient should be evaluated in clinic then they were directed there.  The patient verbally consented to visit.    HPI    37-year-old male last seen in the sleep lab 11/9/2022 for home sleep study; this showed overall AHI 16.5 events per hour lowest SPO2 84%.  Advised auto CPAP 6-18 cm H2O.  Patient presents today for first follow-up since starting PAP machine.  Patient reports having issues with air leak and feeling like pressure is too high; difficult to fall asleep with it on due to pressure feeling too high.  Reports some palpitations as well.  Also reports headaches.    DME [employer] [facility]: Data range: 12/15/2022 - 1/29/2023; average usage 4 hours 20 minutes; average AHI 1.2 average pressure 10 cm H2O.    Diagnoses and all orders for this visit:    1. Obstructive sleep apnea (Primary)  -     PAP Therapy  -     PAP Therapy    Other orders  -     SCANNED - PULMONARY RESULTS         Obstructive sleep apnea adequately treated with auto CPAP 6-18 cm H2O with good compliance and usage and continued complaints of hypersomnolence.  Change to set pressure of 8 cm H2O to help with comfort and air leak.  Prescription order will be placed for this.  Additional prescription order will also be placed for supply renewal.  May need to consider mask fitting however will change pressure first.  Return to clinic in 2 months for follow-up or sooner if needed.    Patient uses the CPAP device and benefits from its use in terms of reduction of hypersomnia and snoring.caution during activities that require prolonged concentration is strongly advised if sleepiness returns. Changing of PAP supplies regularly is important for effective use. Patient needs to change cushion on the mask or plugs on nasal pillows along with disposable filters once every  month and change mask frame, tubing, headgear and Velcro straps every 6 months at the minimum.      Any medications prescribed have been sent electronically to   [facility] [place]  [facility] - [address] [place] [address] - [phone]  - [phone]   [address]  [place] [address]  Phone: [phone] Fax: [phone]        David Denis MD  01/31/23  11:47 EST

## 2023-04-03 ENCOUNTER — OFFICE VISIT (OUTPATIENT)
Dept: SLEEP MEDICINE | Facility: HOSPITAL | Age: 38
End: 2023-04-03
Payer: COMMERCIAL

## 2023-04-03 VITALS
OXYGEN SATURATION: 98 % | HEIGHT: 77 IN | SYSTOLIC BLOOD PRESSURE: 153 MMHG | DIASTOLIC BLOOD PRESSURE: 91 MMHG | BODY MASS INDEX: 37.19 KG/M2 | HEART RATE: 78 BPM | WEIGHT: 315 LBS

## 2023-04-03 DIAGNOSIS — Z99.89 OSA ON CPAP: ICD-10-CM

## 2023-04-03 DIAGNOSIS — G47.33 OSA ON CPAP: ICD-10-CM

## 2023-04-03 DIAGNOSIS — E66.9 CLASS 2 OBESITY: Primary | ICD-10-CM

## 2023-04-03 PROBLEM — E66.812 CLASS 2 OBESITY: Status: ACTIVE | Noted: 2023-04-03

## 2023-04-03 PROCEDURE — 1160F RVW MEDS BY RX/DR IN RCRD: CPT | Performed by: INTERNAL MEDICINE

## 2023-04-03 PROCEDURE — 1159F MED LIST DOCD IN RCRD: CPT | Performed by: INTERNAL MEDICINE

## 2023-04-03 PROCEDURE — G0463 HOSPITAL OUTPT CLINIC VISIT: HCPCS

## 2023-04-03 PROCEDURE — 3077F SYST BP >= 140 MM HG: CPT | Performed by: INTERNAL MEDICINE

## 2023-04-03 PROCEDURE — 3080F DIAST BP >= 90 MM HG: CPT | Performed by: INTERNAL MEDICINE

## 2023-04-03 PROCEDURE — 99213 OFFICE O/P EST LOW 20 MIN: CPT | Performed by: INTERNAL MEDICINE

## 2023-04-03 NOTE — PROGRESS NOTES
"  [address] [facility] [address]  Phone: [phone]  Fax: [phone]      SLEEP CLINIC FOLLOW UP PROGRESS NOTE.    Vance Lorenzo  [id number]   1985  38 y.o.  male      PCP: Onesimo Thayer Jr., MD      Date of visit: 4/3/2023    Chief Complaint   Patient presents with   • Sleep Apnea   • Obesity       HPI:  This is a 38 y.o. years old patient is here for the management of obstructive sleep apnea.  Sleep apnea is moderate in severity with a AHI of 16.5/hr. Patient is using positive airway pressure therapy with CPAP 8 and the symptoms of sleep apnea have improved significantly on the therapy. Normally patient goes to bed at 11 PM and wakes up at 8 AM .  The patient wakes up 1 time(s) during the night and has no problem going back to sleep.  Feels refreshed after waking up.  He also has a history of hypertension and atrial fibrillation and is on medical therapy    Medications and allergies are reviewed by me and documented in the encounter.     SOCIAL (habits pertaining to sleep medicine)  • History tobacco use:No   • History of alcohol use: 0 per week  • Caffeine use: 1     REVIEW OF SYSTEMS:   Pertaining positive symptoms are:  • [place] Sleepiness Scale :Total score: 1       PHYSICAL EXAMINATION:  CONSTITUTIONAL:  Vitals:    04/03/23 1100   BP: 153/91   Pulse: 78   SpO2: 98%   Weight: (!) 147 kg (323 lb 14.4 oz)   Height: 195.6 cm (77.01\")    Body mass index is 38.4 kg/m².   NOSE: nasal passages are clear, No deformities noted   RESP SYSTEM: Not in any respiratory distress, no chest deformities noted,   CARDIOVASULAR: No edema noted  NEURO: Oriented x 3, gait normal,  Mood and affect appeared appropriate      Data reviewed:  The Smart card downloaded on 4/3/2023 has been reviewed independently by me for compliance and discussed the data with the patient.   Compliance; 83%  More than 4 hr use, 70%  Average use of the device 6 hours per " night  Residual AHI: 1.7 /hr (goal < 5.0 /hr)  Mask type: Fullface mask  Device: ResMed  DME: [employer]      ASSESSMENT AND PLAN:  · Obstructive sleep apnea ( G 47.33).  The symptoms of sleep apnea have improved with the device and the treatment.  Patient's compliance with the device is excellent for treatment of sleep apnea.  I have independently reviewed the smart card down load and discussed with the patient the download data and encouarged the patient to continue to use the device.The residual AHI is acceptable. The device is benefiting the patient and the device is medically necessary.  Without proper control of sleep apnea and good compliance there is a increased risk for hypertension, diabetes mellitus and nonrestorative sleep with hypersomnia which can increase risk for motor vehicle accidents.  Untreated sleep apnea is also a risk factor for development of atrial fibrillation, pulmonary hypertension, insulin resistance and stroke. The patient is also instructed to get the supplies from the DME company and and change them on a regular basis.  A prescription for supplies has been sent to the DME company.  I have also discussed the good sleep hygiene habits and adequate amount of sleep needed for good health.  · Obesity  2 with BMI is Body mass index is 38.4 kg/m².. I have discuss the relationship between the weight and sleep apnea. The benefit of weight loss in reducing severity of sleep apnea was discussed. Discussed diet and exercise with the patient to achieve ideal BMI.   · Return in about 1 year (around 4/3/2024) for with smart card down load dr Denis. . Patient's questions were answered.    4/3/2023  [place] Bacon MD  Sleep Medicine.  Medical Director,   [facility], [facility] sleep [facility].

## 2023-05-08 ENCOUNTER — OFFICE VISIT (OUTPATIENT)
Dept: INTERNAL MEDICINE | Facility: CLINIC | Age: 38
End: 2023-05-08
Payer: COMMERCIAL

## 2023-05-08 VITALS
HEIGHT: 72 IN | SYSTOLIC BLOOD PRESSURE: 150 MMHG | DIASTOLIC BLOOD PRESSURE: 106 MMHG | OXYGEN SATURATION: 98 % | TEMPERATURE: 97.7 F | WEIGHT: 315 LBS | BODY MASS INDEX: 42.66 KG/M2 | HEART RATE: 97 BPM

## 2023-05-08 DIAGNOSIS — I10 ESSENTIAL HYPERTENSION: ICD-10-CM

## 2023-05-08 DIAGNOSIS — Z11.59 NEED FOR HEPATITIS C SCREENING TEST: ICD-10-CM

## 2023-05-08 DIAGNOSIS — E78.5 HYPERLIPIDEMIA LDL GOAL <100: ICD-10-CM

## 2023-05-08 DIAGNOSIS — R29.810 FACIAL DROOP: ICD-10-CM

## 2023-05-08 DIAGNOSIS — R73.9 HYPERGLYCEMIA: ICD-10-CM

## 2023-05-08 DIAGNOSIS — R79.1 ABNORMAL COAGULATION PROFILE: ICD-10-CM

## 2023-05-08 DIAGNOSIS — I48.0 PAROXYSMAL ATRIAL FIBRILLATION: Primary | ICD-10-CM

## 2023-05-08 LAB
ALBUMIN SERPL-MCNC: 4.8 G/DL (ref 3.5–5.2)
ALBUMIN/GLOB SERPL: 1.7 G/DL
ALP SERPL-CCNC: 62 U/L (ref 39–117)
ALT SERPL W P-5'-P-CCNC: 32 U/L (ref 1–41)
ANION GAP SERPL CALCULATED.3IONS-SCNC: 12 MMOL/L (ref 5–15)
APTT PPP: 39.6 SECONDS (ref 24.2–34.2)
AST SERPL-CCNC: 28 U/L (ref 1–40)
BASOPHILS # BLD AUTO: 0.05 10*3/MM3 (ref 0–0.2)
BASOPHILS NFR BLD AUTO: 0.5 % (ref 0–1.5)
BILIRUB SERPL-MCNC: 0.8 MG/DL (ref 0–1.2)
BUN SERPL-MCNC: 11 MG/DL (ref 6–20)
BUN/CREAT SERPL: 13.6 (ref 7–25)
CALCIUM SPEC-SCNC: 9.7 MG/DL (ref 8.6–10.5)
CHLORIDE SERPL-SCNC: 103 MMOL/L (ref 98–107)
CHOLEST SERPL-MCNC: 271 MG/DL (ref 0–200)
CO2 SERPL-SCNC: 24 MMOL/L (ref 22–29)
CREAT SERPL-MCNC: 0.81 MG/DL (ref 0.76–1.27)
DEPRECATED RDW RBC AUTO: 41.4 FL (ref 37–54)
EGFRCR SERPLBLD CKD-EPI 2021: 117.2 ML/MIN/1.73
EOSINOPHIL # BLD AUTO: 0.01 10*3/MM3 (ref 0–0.4)
EOSINOPHIL NFR BLD AUTO: 0.1 % (ref 0.3–6.2)
ERYTHROCYTE [DISTWIDTH] IN BLOOD BY AUTOMATED COUNT: 12.7 % (ref 12.3–15.4)
GLOBULIN UR ELPH-MCNC: 2.9 GM/DL
GLUCOSE SERPL-MCNC: 98 MG/DL (ref 65–99)
HBA1C MFR BLD: 5.3 % (ref 4.8–5.6)
HCT VFR BLD AUTO: 54.1 % (ref 37.5–51)
HDLC SERPL-MCNC: 49 MG/DL (ref 40–60)
HGB BLD-MCNC: 18.5 G/DL (ref 13–17.7)
IMM GRANULOCYTES # BLD AUTO: 0.02 10*3/MM3 (ref 0–0.05)
IMM GRANULOCYTES NFR BLD AUTO: 0.2 % (ref 0–0.5)
INR PPP: 1.22 (ref 0.86–1.15)
LDLC SERPL CALC-MCNC: 197 MG/DL (ref 0–100)
LDLC/HDLC SERPL: 3.96 {RATIO}
LYMPHOCYTES # BLD AUTO: 1.94 10*3/MM3 (ref 0.7–3.1)
LYMPHOCYTES NFR BLD AUTO: 19.9 % (ref 19.6–45.3)
MCH RBC QN AUTO: 30.6 PG (ref 26.6–33)
MCHC RBC AUTO-ENTMCNC: 34.2 G/DL (ref 31.5–35.7)
MCV RBC AUTO: 89.6 FL (ref 79–97)
MONOCYTES # BLD AUTO: 0.43 10*3/MM3 (ref 0.1–0.9)
MONOCYTES NFR BLD AUTO: 4.4 % (ref 5–12)
NEUTROPHILS NFR BLD AUTO: 7.29 10*3/MM3 (ref 1.7–7)
NEUTROPHILS NFR BLD AUTO: 74.9 % (ref 42.7–76)
NRBC BLD AUTO-RTO: 0 /100 WBC (ref 0–0.2)
PLATELET # BLD AUTO: 276 10*3/MM3 (ref 140–450)
PMV BLD AUTO: 12.9 FL (ref 6–12)
POTASSIUM SERPL-SCNC: 3.5 MMOL/L (ref 3.5–5.2)
PROT SERPL-MCNC: 7.7 G/DL (ref 6–8.5)
PROTHROMBIN TIME: 15.5 SECONDS (ref 11.8–14.9)
RBC # BLD AUTO: 6.04 10*6/MM3 (ref 4.14–5.8)
SODIUM SERPL-SCNC: 139 MMOL/L (ref 136–145)
TRIGL SERPL-MCNC: 139 MG/DL (ref 0–150)
TSH SERPL DL<=0.05 MIU/L-ACNC: 1.38 UIU/ML (ref 0.27–4.2)
VLDLC SERPL-MCNC: 25 MG/DL (ref 5–40)
WBC NRBC COR # BLD: 9.74 10*3/MM3 (ref 3.4–10.8)

## 2023-05-08 PROCEDURE — 85610 PROTHROMBIN TIME: CPT | Performed by: INTERNAL MEDICINE

## 2023-05-08 PROCEDURE — 83036 HEMOGLOBIN GLYCOSYLATED A1C: CPT | Performed by: INTERNAL MEDICINE

## 2023-05-08 PROCEDURE — 80061 LIPID PANEL: CPT | Performed by: INTERNAL MEDICINE

## 2023-05-08 PROCEDURE — 84443 ASSAY THYROID STIM HORMONE: CPT | Performed by: INTERNAL MEDICINE

## 2023-05-08 PROCEDURE — 80053 COMPREHEN METABOLIC PANEL: CPT | Performed by: INTERNAL MEDICINE

## 2023-05-08 PROCEDURE — 86803 HEPATITIS C AB TEST: CPT | Performed by: INTERNAL MEDICINE

## 2023-05-08 PROCEDURE — 85730 THROMBOPLASTIN TIME PARTIAL: CPT | Performed by: INTERNAL MEDICINE

## 2023-05-08 PROCEDURE — 85025 COMPLETE CBC W/AUTO DIFF WBC: CPT | Performed by: INTERNAL MEDICINE

## 2023-05-08 NOTE — PROGRESS NOTES
Chief Complaint  Annual Exam, bell's palsy (Patient thinks he has this- was going to talk to doc about it /Since Tuesday[facility] ), and Headache (Patient states the whole right side of his head its hurting )    Subjective          Vance Lorenzo presents to [facility]  History of Present Illness  Patient reports improvement in tinnitus since starting flonase.   Patient reports having right facial dropping. x1 week. Patient reports Has and dizziness as well as URI recently. Patient concerned may be associated with CPAP machine. Patient also with dental work last week. Patient was given prednisone and excedrin to help with HAs.  afib- patient tolerating eliquis. He reports he ha snot missed a dose.   Hematuria- noted on previous urinalysis. Due for repeat urinalysis.   hypertension - patient with home Blood Pressure readings that are consistently <130. Patient denies chest pain.   Elevated blood glucose- patient has been walking more often.        Current Outpatient Medications   Medication Instructions   • Acetaminophen-Caffeine 500-65 MG tablet 2 tablets, Oral, 4 Times Daily PRN   • dilTIAZem XR (DILACOR XR) 240 mg, Oral, Daily   • Eliquis 5 MG tablet tablet TAKE ONE TABLET BY MOUTH TWICE DAILY   • flecainide (TAMBOCOR) 100 mg, Oral, Daily PRN   • fluticasone (FLONASE) 50 MCG/ACT nasal spray 2 sprays, Nasal, Daily   • hydroCHLOROthiazide (HYDRODIURIL) 25 MG tablet TAKE ONE TABLET BY MOUTH EVERY DAY   • loteprednol (LOTEMAX) 0.5 % ophthalmic suspension INSTILL 1 DROP IN EACH EYE FOUR TIMES DAILY   • Xiidra 5 % ophthalmic solution INSTILL 1 DROP IN EACH EYE TWICE DAILY       The following portions of the patient's history were reviewed and updated as appropriate: allergies, current medications, past family history, past medical history, past social history, past surgical history, and problem list.    Objective   Vital Signs:   BP (!) 150/106 (BP  "Location: Left arm)   Pulse 97   Temp 97.7 °F (36.5 °C) (Temporal)   Ht 182.9 cm (72\")   Wt (!) 143 kg (316 lb)   SpO2 98%   BMI 42.86 kg/m²     Wt Readings from Last 3 Encounters:   05/08/23 (!) 143 kg (316 lb)   05/05/23 (!) 144 kg (318 lb 1.6 oz)   04/03/23 (!) 147 kg (323 lb 14.4 oz)     BP Readings from Last 3 Encounters:   05/08/23 (!) 150/106   05/05/23 153/91   04/03/23 153/91     Physical Exam   Appearance: No acute distress, well-nourished  Head: normocephalic, atraumatic  Eyes: extraocular movements intact, no scleral icterus, no conjunctival injection  Ears, Nose, and Throat: external ears normal, nares patent, moist mucous membranes  Cardiovascular: regular rate and rhythm. no murmurs, rubs, or gallops. no edema  Respiratory: breathing comfortably, symmetric chest rise, clear to auscultation bilaterally. No wheezes, rales, or rhonchi.  Neuro: alert and oriented to time, place, and person. Normal gait  Psych: normal mood and affect     Result Review :   The following data was reviewed by: Onesimo Thayer Jr, MD on 05/08/2023:  Common labs        5/19/2022    07:51 12/26/2022    11:33   Common Labs   Glucose  107     BUN  8     Creatinine  0.82     Sodium  138     Potassium  3.8     Chloride  100     Calcium  9.6     Albumin 4.50   4.60     Total Bilirubin 0.8   0.8     Alkaline Phosphatase 58   67     AST (SGOT) 15   16     ALT (SGPT) 17   19     WBC 5.84   8.27     Hemoglobin 16.6   16.1     Hematocrit 49.4   45.5     Platelets 223   230     Total Cholesterol 203      Triglycerides 126      HDL Cholesterol 42      LDL Cholesterol  138            Lab Results   Component Value Date    INR 1.16 (H) 12/26/2022    BILIRUBINUR Negative 12/26/2022          Assessment and Plan    Diagnoses and all orders for this visit:    1. Paroxysmal atrial fibrillation (Primary)  Comments:  rate controlled today. cont eliquis.   Orders:  -     TSH    2. Essential hypertension  Comments:  elevated today, but " may be related to caffiene in excedrin. montior closely. encouraged bringing home cuff to clinic to ensure accuracy.   Orders:  -     CBC & Differential  -     Comprehensive Metabolic Panel    3. Hyperlipidemia LDL goal <100  -     Lipid Panel    4. Abnormal coagulation profile  -     Protime-INR  -     aPTT    5. Facial droop  Comments:  Dx'd with Bell's Palsy by [facility] last week. given risk factors, will obtain brain MRI to rule out CVA.   Orders:  -     TSH  -     MRI Brain With & Without Contrast; Future    6. Need for hepatitis C screening test  -     HCV Antibody Rfx To Qnt PCR    7. Hyperglycemia  -     Hemoglobin A1c        Medications Discontinued During This Encounter   Medication Reason   • acyclovir (Zovirax) 5 % ointment *Therapy completed   • predniSONE (DELTASONE) 20 MG tablet *Therapy completed          Follow Up   Return in about 6 months (around 11/8/2023) for Annual physical.  Patient was given instructions and counseling regarding his condition or for health maintenance advice. Please see specific information pulled into the AVS if appropriate.       Onesimo Thayer Jr, MD  05/08/23  09:59 EDT

## 2023-05-09 ENCOUNTER — TELEPHONE (OUTPATIENT)
Dept: INTERNAL MEDICINE | Facility: CLINIC | Age: 38
End: 2023-05-09
Payer: COMMERCIAL

## 2023-05-09 DIAGNOSIS — D75.1 POLYCYTHEMIA: Primary | ICD-10-CM

## 2023-05-09 DIAGNOSIS — E78.5 HYPERLIPIDEMIA LDL GOAL <100: ICD-10-CM

## 2023-05-09 DIAGNOSIS — R79.1 ABNORMAL COAGULATION PROFILE: ICD-10-CM

## 2023-05-09 LAB
HCV AB SERPL QL IA: NORMAL
HCV IGG SERPL QL IA: NON REACTIVE

## 2023-05-09 RX ORDER — ATORVASTATIN CALCIUM 10 MG/1
10 TABLET, FILM COATED ORAL NIGHTLY
Qty: 90 TABLET | Refills: 3 | Status: SHIPPED | OUTPATIENT
Start: 2023-05-09

## 2023-05-09 NOTE — TELEPHONE ENCOUNTER
----- Message from Onesimo Thayer Jr., MD sent at 5/9/2023 11:20 AM EDT -----  LDL is elevated - this has been associated with increased risk of cardiovascular disease including heart attacks and strokes. Would recommend low cholesterol diet to reduce.   This level is very high - recommend starting atorvastatin to reduce risk of cardiovascular disease. Will send new Prescription.     Polycythemia noted - would recommend recheck complete blood count in 1 month and will place order.     Other labs ok.

## 2023-05-11 ENCOUNTER — CLINICAL SUPPORT (OUTPATIENT)
Dept: INTERNAL MEDICINE | Facility: CLINIC | Age: 38
End: 2023-05-11
Payer: COMMERCIAL

## 2023-05-11 VITALS — HEART RATE: 83 BPM | OXYGEN SATURATION: 98 % | DIASTOLIC BLOOD PRESSURE: 92 MMHG | SYSTOLIC BLOOD PRESSURE: 150 MMHG

## 2023-05-11 DIAGNOSIS — I10 ESSENTIAL HYPERTENSION: ICD-10-CM

## 2023-05-16 ENCOUNTER — HOSPITAL ENCOUNTER (OUTPATIENT)
Dept: MRI IMAGING | Facility: HOSPITAL | Age: 38
Discharge: HOME | End: 2023-05-16
Admitting: INTERNAL MEDICINE
Payer: COMMERCIAL

## 2023-05-16 DIAGNOSIS — R29.810 FACIAL DROOP: ICD-10-CM

## 2023-05-16 PROCEDURE — A9577 INJ MULTIHANCE: HCPCS | Performed by: INTERNAL MEDICINE

## 2023-05-16 PROCEDURE — 70553 MRI BRAIN STEM W/O & W/DYE: CPT

## 2023-05-16 PROCEDURE — 0 GADOBENATE DIMEGLUMINE 529 MG/ML SOLUTION: Performed by: INTERNAL MEDICINE

## 2023-05-16 RX ADMIN — GADOBENATE DIMEGLUMINE 20 ML: 529 INJECTION, SOLUTION INTRAVENOUS at 13:35

## 2023-05-19 ENCOUNTER — PATIENT MESSAGE (OUTPATIENT)
Dept: INTERNAL MEDICINE | Facility: CLINIC | Age: 38
End: 2023-05-19
Payer: COMMERCIAL

## 2023-05-19 RX ORDER — ROSUVASTATIN CALCIUM 5 MG/1
5 TABLET, COATED ORAL NIGHTLY
Qty: 90 TABLET | Refills: 3 | Status: SHIPPED | OUTPATIENT
Start: 2023-05-19

## 2023-05-19 NOTE — TELEPHONE ENCOUNTER
From: Vance Lorenzo  To: Onesimo Thayer  Sent: 5/19/2023 10:47 AM EDT  Subject: Cholesterol Pill     I have noticed that 1 hour after I take my cholesterol pill my ears start ringing louder, my arms feel weak and sore and my lower back starts hurting on both sides. Is there another type of pill I can try?

## 2023-06-07 ENCOUNTER — CLINICAL SUPPORT (OUTPATIENT)
Dept: INTERNAL MEDICINE | Facility: CLINIC | Age: 38
End: 2023-06-07
Payer: COMMERCIAL

## 2023-06-07 ENCOUNTER — LAB (OUTPATIENT)
Dept: LAB | Facility: HOSPITAL | Age: 38
End: 2023-06-07
Payer: COMMERCIAL

## 2023-06-07 DIAGNOSIS — D75.1 POLYCYTHEMIA: ICD-10-CM

## 2023-06-07 DIAGNOSIS — R79.1 ABNORMAL COAGULATION PROFILE: ICD-10-CM

## 2023-06-07 DIAGNOSIS — R79.1 ABNORMAL COAGULATION PROFILE: Primary | ICD-10-CM

## 2023-06-07 LAB
BASOPHILS # BLD AUTO: 0.05 10*3/MM3 (ref 0–0.2)
BASOPHILS NFR BLD AUTO: 0.6 % (ref 0–1.5)
DEPRECATED RDW RBC AUTO: 39.6 FL (ref 37–54)
EOSINOPHIL # BLD AUTO: 0.08 10*3/MM3 (ref 0–0.4)
EOSINOPHIL NFR BLD AUTO: 1 % (ref 0.3–6.2)
ERYTHROCYTE [DISTWIDTH] IN BLOOD BY AUTOMATED COUNT: 12.2 % (ref 12.3–15.4)
HCT VFR BLD AUTO: 47.4 % (ref 37.5–51)
HGB BLD-MCNC: 16.4 G/DL (ref 13–17.7)
LYMPHOCYTES # BLD AUTO: 2.72 10*3/MM3 (ref 0.7–3.1)
LYMPHOCYTES NFR BLD AUTO: 33.7 % (ref 19.6–45.3)
MCH RBC QN AUTO: 30.9 PG (ref 26.6–33)
MCHC RBC AUTO-ENTMCNC: 34.6 G/DL (ref 31.5–35.7)
MCV RBC AUTO: 89.4 FL (ref 79–97)
MONOCYTES # BLD AUTO: 0.6 10*3/MM3 (ref 0.1–0.9)
MONOCYTES NFR BLD AUTO: 7.4 % (ref 5–12)
NEUTROPHILS NFR BLD AUTO: 4.59 10*3/MM3 (ref 1.7–7)
NEUTROPHILS NFR BLD AUTO: 57.1 % (ref 42.7–76)
PLATELET # BLD AUTO: 256 10*3/MM3 (ref 140–450)
PMV BLD AUTO: 14 FL (ref 6–12)
RBC # BLD AUTO: 5.3 10*6/MM3 (ref 4.14–5.8)
WBC NRBC COR # BLD: 8.06 10*3/MM3 (ref 3.4–10.8)

## 2023-06-07 PROCEDURE — 36415 COLL VENOUS BLD VENIPUNCTURE: CPT | Performed by: INTERNAL MEDICINE

## 2023-06-07 PROCEDURE — 85025 COMPLETE CBC W/AUTO DIFF WBC: CPT | Performed by: INTERNAL MEDICINE

## 2023-08-21 DIAGNOSIS — I10 ESSENTIAL HYPERTENSION: ICD-10-CM

## 2023-08-21 RX ORDER — HYDROCHLOROTHIAZIDE 25 MG/1
TABLET ORAL
Qty: 90 TABLET | Refills: 1 | Status: SHIPPED | OUTPATIENT
Start: 2023-08-21

## 2023-09-12 RX ORDER — APIXABAN 5 MG/1
TABLET, FILM COATED ORAL
Qty: 180 TABLET | Refills: 3 | Status: SHIPPED | OUTPATIENT
Start: 2023-09-12

## 2023-11-09 ENCOUNTER — OFFICE VISIT (OUTPATIENT)
Dept: INTERNAL MEDICINE | Facility: CLINIC | Age: 38
End: 2023-11-09
Payer: COMMERCIAL

## 2023-11-09 ENCOUNTER — TELEPHONE (OUTPATIENT)
Dept: INTERNAL MEDICINE | Facility: CLINIC | Age: 38
End: 2023-11-09

## 2023-11-09 VITALS
HEART RATE: 91 BPM | TEMPERATURE: 97.5 F | SYSTOLIC BLOOD PRESSURE: 154 MMHG | WEIGHT: 305.6 LBS | HEIGHT: 72 IN | OXYGEN SATURATION: 97 % | BODY MASS INDEX: 41.39 KG/M2 | DIASTOLIC BLOOD PRESSURE: 90 MMHG

## 2023-11-09 DIAGNOSIS — R31.9 HEMATURIA, UNSPECIFIED TYPE: ICD-10-CM

## 2023-11-09 DIAGNOSIS — I48.0 PAROXYSMAL ATRIAL FIBRILLATION: ICD-10-CM

## 2023-11-09 DIAGNOSIS — I10 ESSENTIAL HYPERTENSION: ICD-10-CM

## 2023-11-09 DIAGNOSIS — E78.5 HYPERLIPIDEMIA LDL GOAL <100: ICD-10-CM

## 2023-11-09 DIAGNOSIS — Z23 NEED FOR COVID-19 VACCINE: ICD-10-CM

## 2023-11-09 DIAGNOSIS — H93.13 TINNITUS OF BOTH EARS: ICD-10-CM

## 2023-11-09 DIAGNOSIS — R09.81 NASAL CONGESTION: ICD-10-CM

## 2023-11-09 DIAGNOSIS — R05.8 OTHER COUGH: ICD-10-CM

## 2023-11-09 DIAGNOSIS — U07.1 COVID-19 VIRUS INFECTION: ICD-10-CM

## 2023-11-09 DIAGNOSIS — Z23 NEED FOR INFLUENZA VACCINATION: ICD-10-CM

## 2023-11-09 DIAGNOSIS — Z00.00 ANNUAL PHYSICAL EXAM: Primary | ICD-10-CM

## 2023-11-09 LAB
EXPIRATION DATE: ABNORMAL
EXPIRATION DATE: NORMAL
FLUAV AG UPPER RESP QL IA.RAPID: NOT DETECTED
FLUBV AG UPPER RESP QL IA.RAPID: NOT DETECTED
INTERNAL CONTROL: ABNORMAL
INTERNAL CONTROL: NORMAL
Lab: ABNORMAL
Lab: NORMAL
S PYO AG THROAT QL: NEGATIVE
SARS-COV-2 AG UPPER RESP QL IA.RAPID: DETECTED

## 2023-11-09 NOTE — PROGRESS NOTES
Chief Complaint  Fever (High fever /Tuesday was 100 /103 yesterday /This morning 99.1), Nasal Congestion, Cough, Hypertension (Follow up ), Diarrhea (Been water), Earache (A real loud ringing and three time they have gone out to were he couldn't ear anything ), dark urine (Patient stated that it has no smell ), and Nose Bleed    Subjective          Vance Lorenzo presents to [facility]  History of Present Illness  Patient reports fevers, congestion, cough x3 days. Patient denies shortness of breath, chest pain, vomiting. Patient reports having nonbloody, watery diarrhea. Patient reports tinnitus is worse with sickness. He had an episode of hearing loss for 1-2 minutes he noticed while washing his hand. Patient denies blurry vision or presyncope during episode. Patient has been taking tylenol and corecitin as well.   afib- patient tolerating eliquis. He reports he has not missed a dose. Patient takes flecainide on as needed.   Hematuria- noted on previous urinalysis. Due for repeat urinalysis.   hypertension - patient with home Blood Pressure readings that are consistently <130. Patient denies chest pain, dizziness, headaches.   Elevated blood glucose- noted previously. patient has been walking more often. He has lost 10 lbs in last 6 months.        Current Outpatient Medications   Medication Instructions    dilTIAZem XR (DILACOR XR) 240 MG 24 hr capsule TAKE ONE CAPSULE BY MOUTH DAILY    Eliquis 5 MG tablet tablet TAKE ONE TABLET BY MOUTH TWICE DAILY    flecainide (TAMBOCOR) 100 mg, Oral, Daily PRN    fluticasone (FLONASE) 50 MCG/ACT nasal spray 2 sprays, Nasal, Daily    hydroCHLOROthiazide (HYDRODIURIL) 25 MG tablet TAKE ONE TABLET BY MOUTH EVERY DAY    loteprednol (LOTEMAX) 0.5 % ophthalmic suspension INSTILL 1 DROP IN EACH EYE FOUR TIMES DAILY    Nirmatrelvir&Ritonavir 300/100 (PAXLOVID) 20 x 150 MG & 10 x 100MG tablet therapy pack tablet 3 tablets, Oral, 2  "Times Daily    rosuvastatin (CRESTOR) 5 mg, Oral, Nightly    Xiidra 5 % ophthalmic solution INSTILL 1 DROP IN EACH EYE TWICE DAILY       The following portions of the patient's history were reviewed and updated as appropriate: allergies, current medications, past family history, past medical history, past social history, past surgical history, and problem list.    Objective   Vital Signs:   /90 (BP Location: Left arm)   Pulse 91   Temp 97.5 °F (36.4 °C) (Temporal)   Ht 182.9 cm (72\")   Wt (!) 139 kg (305 lb 9.6 oz)   SpO2 97%   BMI 41.45 kg/m²     Wt Readings from Last 3 Encounters:   11/09/23 (!) 139 kg (305 lb 9.6 oz)   07/13/23 (!) 143 kg (316 lb)   05/08/23 (!) 143 kg (316 lb)     BP Readings from Last 3 Encounters:   11/09/23 154/90   07/13/23 142/76   05/11/23 150/92     Physical Exam   Appearance: No acute distress, well-nourished  Head: normocephalic, atraumatic  Eyes: extraocular movements intact, no scleral icterus, no conjunctival injection  Ears, Nose, and Throat: external ears normal, nares patent, moist mucous membranes  Cardiovascular: regular rate and rhythm. no murmurs, rubs, or gallops. no edema  Respiratory: breathing comfortably, symmetric chest rise, clear to auscultation bilaterally. No wheezes, rales, or rhonchi.  Neuro: alert and oriented to time, place, and person. Normal gait  Psych: normal mood and affect     Result Review :   The following data was reviewed by: Onesimo Thayer Jr, MD on 05/08/2023:  Common labs          12/26/2022    11:33 5/8/2023    10:56 6/7/2023    09:58   Common Labs   Glucose 107  98     BUN 8  11     Creatinine 0.82  0.81     Sodium 138  139     Potassium 3.8  3.5     Chloride 100  103     Calcium 9.6  9.7     Albumin 4.60  4.8     Total Bilirubin 0.8  0.8     Alkaline Phosphatase 67  62     AST (SGOT) 16  28     ALT (SGPT) 19  32     WBC 8.27  9.74  8.06    Hemoglobin 16.1  18.5  16.4    Hematocrit 45.5  54.1  47.4    Platelets 230  276  256  "   Total Cholesterol  271     Triglycerides  139     HDL Cholesterol  49     LDL Cholesterol   197     Hemoglobin A1C  5.30           Lab Results   Component Value Date    SARSANTIGEN Detected (A) 11/09/2023    FLUAAG Not Detected 11/09/2023    FLUBAG Not Detected 11/09/2023    RAPSCRN Negative 11/09/2023    INR 1.22 (H) 05/08/2023    BILIRUBINUR Negative 12/26/2022          Assessment and Plan    Diagnoses and all orders for this visit:    1. Annual physical exam (Primary)    2. Paroxysmal atrial fibrillation  Comments:  rate controlled. cont eliquis and BB    3. Hyperlipidemia LDL goal <100  Comments:  check labs when well    4. Essential hypertension  Comments:  elevated today, but possibly due to acute illness. monitor. goal BP <130/80    5. Need for influenza vaccination  Comments:  defer today while sick.    6. Need for COVID-19 vaccine  Comments:  defer today while sick.    7. Tinnitus of both ears  Comments:  likely related to infection. monitor.    8. Nasal congestion  -     POCT SARS-CoV-2 Antigen JAIME + Flu  -     POCT rapid strep A    9. Other cough  -     POCT rapid strep A    10. COVID-19 virus infection  Comments:  exam reassuring today. will Rx paxlovid in higher risk pt.  on reduce eliquis to once daily and cont hold flecainide.  Orders:  -     Nirmatrelvir&Ritonavir 300/100 (PAXLOVID) 20 x 150 MG & 10 x 100MG tablet therapy pack tablet; Take 3 tablets by mouth 2 (Two) Times a Day for 5 days.  Dispense: 30 tablet; Refill: 0    11. Hematuria, unspecified type  -     Urinalysis With Microscopic - Urine, Clean Catch; Future      Advised on diet, physical activity, etc      There are no discontinued medications.       Follow Up   Return if symptoms worsen or fail to improve.  Patient was given instructions and counseling regarding his condition or for health maintenance advice. Please see specific information pulled into the AVS if appropriate.       Onesimo Thayer Jr, MD  11/09/23  11:04  EST

## 2023-11-09 NOTE — TELEPHONE ENCOUNTER
Caller: SATURNINO BANKS    Relationship: Mother    Best call back number: [phone]     What is the best time to reach you: NOT BETWEEN 1 AND 2 PM    Who are you requesting to speak with (clinical staff, provider,  specific staff member): CLINICAL     What was the call regarding: MEDICATION QUESTIONS REGARDING COVID DIAGNOSIS AND MEDICATIONS TO HOLD ETC.     Is it okay if the provider responds through MyChart: CALL PLEASE.

## 2023-11-10 NOTE — TELEPHONE ENCOUNTER
Patient educated to continue holding flecainide and to reduce eliquis to once daily dosing. Please ensure confirmed. I believe Kari spoke with this patient directly already

## 2023-11-10 NOTE — TELEPHONE ENCOUNTER
Patient was advised of medication recommendations after I directly spoke with Dr. Thayer. Spoke with patient in 11/9/23.

## 2023-11-27 ENCOUNTER — PATIENT MESSAGE (OUTPATIENT)
Dept: INTERNAL MEDICINE | Facility: CLINIC | Age: 38
End: 2023-11-27
Payer: COMMERCIAL

## 2023-11-27 DIAGNOSIS — E78.5 HYPERLIPIDEMIA LDL GOAL <100: ICD-10-CM

## 2023-11-27 DIAGNOSIS — I10 ESSENTIAL HYPERTENSION: Primary | ICD-10-CM

## 2023-11-27 NOTE — TELEPHONE ENCOUNTER
From: Vance Lorenzo  To: Onesimo Thayer  Sent: 11/27/2023 10:06 AM EST  Subject: Bloodwork    On my last visit, you told me to get bloodwork done. I just wanted to know if you sent the order for bloodwork to be done so I can get it done this week.

## 2023-12-06 ENCOUNTER — LAB (OUTPATIENT)
Dept: LAB | Facility: HOSPITAL | Age: 38
End: 2023-12-06
Payer: COMMERCIAL

## 2023-12-06 DIAGNOSIS — R31.9 HEMATURIA, UNSPECIFIED TYPE: ICD-10-CM

## 2023-12-06 DIAGNOSIS — R79.1 ABNORMAL COAGULATION PROFILE: ICD-10-CM

## 2023-12-06 DIAGNOSIS — E78.5 HYPERLIPIDEMIA LDL GOAL <100: ICD-10-CM

## 2023-12-06 DIAGNOSIS — I10 ESSENTIAL HYPERTENSION: ICD-10-CM

## 2023-12-06 LAB
ALBUMIN SERPL-MCNC: 4.6 G/DL (ref 3.5–5.2)
ALBUMIN/GLOB SERPL: 1.7 G/DL
ALP SERPL-CCNC: 67 U/L (ref 39–117)
ALT SERPL W P-5'-P-CCNC: 20 U/L (ref 1–41)
ANION GAP SERPL CALCULATED.3IONS-SCNC: 13.2 MMOL/L (ref 5–15)
APTT PPP: 33.1 SECONDS (ref 24.2–34.2)
AST SERPL-CCNC: 21 U/L (ref 1–40)
BACTERIA UR QL AUTO: NORMAL /HPF
BASOPHILS # BLD AUTO: 0.05 10*3/MM3 (ref 0–0.2)
BASOPHILS NFR BLD AUTO: 0.6 % (ref 0–1.5)
BILIRUB SERPL-MCNC: 0.9 MG/DL (ref 0–1.2)
BILIRUB UR QL STRIP: NEGATIVE
BUN SERPL-MCNC: 5 MG/DL (ref 6–20)
BUN/CREAT SERPL: 6 (ref 7–25)
CALCIUM SPEC-SCNC: 9.6 MG/DL (ref 8.6–10.5)
CHLORIDE SERPL-SCNC: 103 MMOL/L (ref 98–107)
CHOLEST SERPL-MCNC: 168 MG/DL (ref 0–200)
CLARITY UR: CLEAR
CO2 SERPL-SCNC: 25.8 MMOL/L (ref 22–29)
COD CRY URNS QL: NORMAL /HPF
COLOR UR: YELLOW
CREAT SERPL-MCNC: 0.83 MG/DL (ref 0.76–1.27)
DEPRECATED RDW RBC AUTO: 39.7 FL (ref 37–54)
EGFRCR SERPLBLD CKD-EPI 2021: 116.3 ML/MIN/1.73
EOSINOPHIL # BLD AUTO: 0.06 10*3/MM3 (ref 0–0.4)
EOSINOPHIL NFR BLD AUTO: 0.7 % (ref 0.3–6.2)
ERYTHROCYTE [DISTWIDTH] IN BLOOD BY AUTOMATED COUNT: 12.2 % (ref 12.3–15.4)
GLOBULIN UR ELPH-MCNC: 2.7 GM/DL
GLUCOSE SERPL-MCNC: 91 MG/DL (ref 65–99)
GLUCOSE UR STRIP-MCNC: NEGATIVE MG/DL
HCT VFR BLD AUTO: 46.9 % (ref 37.5–51)
HDLC SERPL-MCNC: 38 MG/DL (ref 40–60)
HGB BLD-MCNC: 16.4 G/DL (ref 13–17.7)
HGB UR QL STRIP.AUTO: NEGATIVE
HYALINE CASTS UR QL AUTO: NORMAL /LPF
INR PPP: 1.24 (ref 0.86–1.15)
KETONES UR QL STRIP: ABNORMAL
LDLC SERPL CALC-MCNC: 106 MG/DL (ref 0–100)
LDLC/HDLC SERPL: 2.73 {RATIO}
LEUKOCYTE ESTERASE UR QL STRIP.AUTO: NEGATIVE
LYMPHOCYTES # BLD AUTO: 3.28 10*3/MM3 (ref 0.7–3.1)
LYMPHOCYTES NFR BLD AUTO: 39.5 % (ref 19.6–45.3)
MCH RBC QN AUTO: 31.2 PG (ref 26.6–33)
MCHC RBC AUTO-ENTMCNC: 35 G/DL (ref 31.5–35.7)
MCV RBC AUTO: 89.3 FL (ref 79–97)
MONOCYTES # BLD AUTO: 0.72 10*3/MM3 (ref 0.1–0.9)
MONOCYTES NFR BLD AUTO: 8.7 % (ref 5–12)
MUCOUS THREADS URNS QL MICRO: NORMAL /HPF
NEUTROPHILS NFR BLD AUTO: 4.18 10*3/MM3 (ref 1.7–7)
NEUTROPHILS NFR BLD AUTO: 50.3 % (ref 42.7–76)
NITRITE UR QL STRIP: NEGATIVE
PH UR STRIP.AUTO: 6.5 [PH] (ref 5–8)
PLATELET # BLD AUTO: 227 10*3/MM3 (ref 140–450)
PMV BLD AUTO: 14.3 FL (ref 6–12)
POTASSIUM SERPL-SCNC: 3.7 MMOL/L (ref 3.5–5.2)
PROT SERPL-MCNC: 7.3 G/DL (ref 6–8.5)
PROT UR QL STRIP: NEGATIVE
PROTHROMBIN TIME: 15.9 SECONDS (ref 11.8–14.9)
RBC # BLD AUTO: 5.25 10*6/MM3 (ref 4.14–5.8)
RBC # UR STRIP: NORMAL /HPF
REF LAB TEST METHOD: NORMAL
SODIUM SERPL-SCNC: 142 MMOL/L (ref 136–145)
SP GR UR STRIP: 1.02 (ref 1–1.03)
SQUAMOUS #/AREA URNS HPF: NORMAL /HPF
TRIGL SERPL-MCNC: 131 MG/DL (ref 0–150)
UROBILINOGEN UR QL STRIP: ABNORMAL
VLDLC SERPL-MCNC: 24 MG/DL (ref 5–40)
WBC # UR STRIP: NORMAL /HPF
WBC NRBC COR # BLD AUTO: 8.31 10*3/MM3 (ref 3.4–10.8)

## 2023-12-06 PROCEDURE — 80053 COMPREHEN METABOLIC PANEL: CPT

## 2023-12-06 PROCEDURE — 80061 LIPID PANEL: CPT

## 2023-12-06 PROCEDURE — 85730 THROMBOPLASTIN TIME PARTIAL: CPT

## 2023-12-06 PROCEDURE — 36415 COLL VENOUS BLD VENIPUNCTURE: CPT

## 2023-12-06 PROCEDURE — 81001 URINALYSIS AUTO W/SCOPE: CPT

## 2023-12-06 PROCEDURE — 85025 COMPLETE CBC W/AUTO DIFF WBC: CPT

## 2023-12-06 PROCEDURE — 85610 PROTHROMBIN TIME: CPT

## 2023-12-07 ENCOUNTER — TELEPHONE (OUTPATIENT)
Dept: INTERNAL MEDICINE | Facility: CLINIC | Age: 38
End: 2023-12-07
Payer: COMMERCIAL

## 2023-12-07 DIAGNOSIS — R79.1 ELEVATED INR: Primary | ICD-10-CM

## 2023-12-07 NOTE — TELEPHONE ENCOUNTER
----- Message from Onesimo Thayer Jr., MD sent at 12/7/2023  8:02 AM EST -----  HDL low - this is protective cholesterol and generally like the number to be >50. encourage exercise to increase level.     LDL much improved.     Persistently elevated INR - will refer to hematology for evaluation.

## 2023-12-07 NOTE — TELEPHONE ENCOUNTER
PT has been made aware pt would like to know if pt being on blood thinners can cause there INR to be elevated ?    ----- Message from Onesimo Thayer Jr., MD sent at 12/7/2023  8:02 AM EST -----  HDL low - this is protective cholesterol and generally like the number to be >50. encourage exercise to increase level.     LDL much improved.     Persistently elevated INR - will refer to hematology for evaluation.

## 2024-01-16 NOTE — PROGRESS NOTES
Chief Complaint  Follow-up    Subjective            History of Present Illness  Vance Lorenzo is a 38-year-old male patient who presents to the office today for follow-up.  He has atrial fibrillation, hypertension, and hyperlipidemia.  He reports a couple episodes of palpitations with shortness of breath usually occurring in the evenings and during the night while sleeping, causes him to wake up. He has been unable to use CPAP machine for the last month due to waiting on the mask to be delivered. He denies any issues during the daytime other than fatigue. He denies any tachycardic episodes. He does not take the flecainide on a daily basis. He has only taken 3 doses since July. He admits to caffeine intake which he is going to try to stop.    PMH  Past Medical History:   Diagnosis Date    Essential hypertension 12/01/2021    Hyperlipidemia LDL goal <100 12/01/2021    Paroxysmal atrial fibrillation 12/01/2021    Tinnitus     Xerosis of skin 08/17/2018         ALLERGY  Allergies   Allergen Reactions    Metoprolol Tinnitus          SURGICALHX  History reviewed. No pertinent surgical history.       SOC  Social History     Socioeconomic History    Marital status: Single   Tobacco Use    Smoking status: Never     Passive exposure: Never    Smokeless tobacco: Never   Vaping Use    Vaping Use: Never used   Substance and Sexual Activity    Alcohol use: Never    Drug use: Never    Sexual activity: Defer         FAMHX  Family History   Problem Relation Age of Onset    Breast cancer Maternal Grandmother         50s    Cancer Paternal Grandmother     Cancer Paternal Grandfather     Heart failure Paternal Grandfather           MEDSIGONLY  Current Outpatient Medications on File Prior to Visit   Medication Sig    dilTIAZem XR (DILACOR XR) 240 MG 24 hr capsule TAKE ONE CAPSULE BY MOUTH DAILY    Eliquis 5 MG tablet tablet TAKE ONE TABLET BY MOUTH TWICE DAILY    fluticasone (FLONASE) 50 MCG/ACT nasal spray 2 sprays into the  "nostril(s) as directed by provider Daily.    hydroCHLOROthiazide (HYDRODIURIL) 25 MG tablet TAKE ONE TABLET BY MOUTH EVERY DAY    loteprednol (LOTEMAX) 0.5 % ophthalmic suspension INSTILL 1 DROP IN EACH EYE FOUR TIMES DAILY    rosuvastatin (CRESTOR) 5 MG tablet Take 1 tablet by mouth Every Night.    Xiidra 5 % ophthalmic solution INSTILL 1 DROP IN EACH EYE TWICE DAILY    [DISCONTINUED] flecainide (TAMBOCOR) 100 MG tablet Take 1 tablet by mouth Daily As Needed (for palpitations and/or heart rate greater than 100 bpm).     No current facility-administered medications on file prior to visit.         Objective   /95   Pulse 78   Ht 182.9 cm (72\")   Wt (!) 140 kg (309 lb)   BMI 41.91 kg/m²       Physical Exam  HENT:      Head: Normocephalic.   Neck:      Vascular: No carotid bruit.   Cardiovascular:      Rate and Rhythm: Normal rate and regular rhythm.      Pulses: Normal pulses.      Heart sounds: Normal heart sounds. No murmur heard.  Pulmonary:      Effort: Pulmonary effort is normal.      Breath sounds: Normal breath sounds.   Musculoskeletal:      Cervical back: Neck supple.      Right lower leg: No edema.      Left lower leg: No edema.   Skin:     General: Skin is dry.   Neurological:      Mental Status: He is alert and oriented to person, place, and time.   Psychiatric:         Behavior: Behavior normal.       ECG 12 Lead    Date/Time: 1/17/2024 8:56 AM  Performed by: Samia Veliz APRN    Authorized by: Samia Veliz APRN  Comparison: compared with previous ECG from 12/9/2022  Similar to previous ECG  Comparison to previous ECG: Improved rate  Rhythm: atrial fibrillation  Rate: normal  BPM: 89  Conduction: conduction normal  Other findings: non-specific ST-T wave changes    Clinical impression: abnormal EKG        Result Review :   The following data was reviewed by: MARSHA Crespo on 01/17/2024:  proBNP   Date Value Ref Range Status   07/27/2022 21.5 0.0 - 450.0 pg/mL Final " "        12/6/2023    15:25   CMP   Glucose 91    BUN 5    Creatinine 0.83    EGFR 116.3    Sodium 142    Potassium 3.7    Chloride 103    Calcium 9.6    Total Protein 7.3    Albumin 4.6    Globulin 2.7    Total Bilirubin 0.9    Alkaline Phosphatase 67    AST (SGOT) 21    ALT (SGPT) 20    Albumin/Globulin Ratio 1.7    BUN/Creatinine Ratio 6.0    Anion Gap 13.2          12/6/2023    15:25   CBC w/Diff   WBC 8.31    RBC 5.25    Hemoglobin 16.4    Hematocrit 46.9    MCV 89.3    MCH 31.2    MCHC 35.0    RDW 12.2    Platelets 227    Neutrophil Rel % 50.3    Immature Granulocyte Rel %    Lymphocyte Rel % 39.5    Monocyte Rel % 8.7    Eosinophil Rel % 0.7    Basophil Rel % 0.6       Lab Results   Component Value Date    TSH 1.380 05/08/2023      Lab Results   Component Value Date    FREET4 1.1 02/11/2019      No results found for: \"DDIMERQUANT\"  Magnesium   Date Value Ref Range Status   01/15/2022 2.0 1.6 - 2.6 mg/dL Final      No results found for: \"DIGOXIN\"   No results found for: \"TROPONINT\"            12/6/2023    15:25   Lipid Panel   Total Cholesterol 168    Triglycerides 131    HDL Cholesterol 38    VLDL Cholesterol 24    LDL Cholesterol  106    LDL/HDL Ratio 2.73        [id number] Score: 1      Assessment and Plan    Diagnoses and all orders for this visit:    1. Paroxysmal atrial fibrillation (Primary)  He is going to attempt flecainide every other day for better rhythm control.  Continue diltiazem 240 mg daily for rate control.  Avoid caffeine intake.  Continue Eliquis for CVA prevention.  If flecainide every other day is unsuccessful and controlling his rhythm and will refer to electrophysiology for possible ablation therapy due to the fact he has been intolerant to other antiarrhythmics in the past.  -     ECG 12 Lead    2. Essential hypertension  Currently controlled and without adverse effects from medication, continue hydrochlorothiazide 25 mg daily.    3. Hyperlipidemia LDL goal <100  Last lipid panel was " 12/6/2023 with  which is only slightly above goal range.  Continue rosuvastatin 5 mg nightly.      Other orders  -     flecainide (TAMBOCOR) 100 MG tablet; Take 1 tablet by mouth Every Other Day.  Dispense: 45 tablet; Refill: 3          Follow Up   Return in about 6 months (around 7/17/2024) for Follow up with Dr Castellano.    Patient was given instructions and counseling regarding his condition or for health maintenance advice. Please see specific information pulled into the AVS if appropriate.     Vance Lorenzo  reports that he has never smoked. He has never been exposed to tobacco smoke. He has never used smokeless tobacco.           Samia Veliz, MARSHA  01/17/24  16:05 EST    Dictated Utilizing Dragon Dictation

## 2024-01-17 ENCOUNTER — OFFICE VISIT (OUTPATIENT)
Dept: CARDIOLOGY | Facility: CLINIC | Age: 39
End: 2024-01-17
Payer: COMMERCIAL

## 2024-01-17 VITALS
HEART RATE: 78 BPM | DIASTOLIC BLOOD PRESSURE: 95 MMHG | HEIGHT: 72 IN | SYSTOLIC BLOOD PRESSURE: 133 MMHG | BODY MASS INDEX: 41.85 KG/M2 | WEIGHT: 309 LBS

## 2024-01-17 DIAGNOSIS — I48.0 PAROXYSMAL ATRIAL FIBRILLATION: Primary | ICD-10-CM

## 2024-01-17 DIAGNOSIS — E78.5 HYPERLIPIDEMIA LDL GOAL <100: ICD-10-CM

## 2024-01-17 DIAGNOSIS — I10 ESSENTIAL HYPERTENSION: ICD-10-CM

## 2024-01-17 RX ORDER — FLECAINIDE ACETATE 100 MG/1
100 TABLET ORAL EVERY OTHER DAY
Qty: 45 TABLET | Refills: 3 | Status: SHIPPED | OUTPATIENT
Start: 2024-01-17

## 2024-02-01 ENCOUNTER — CONSULT (OUTPATIENT)
Dept: ONCOLOGY | Facility: HOSPITAL | Age: 39
End: 2024-02-01
Payer: COMMERCIAL

## 2024-02-01 ENCOUNTER — LAB (OUTPATIENT)
Dept: ONCOLOGY | Facility: HOSPITAL | Age: 39
End: 2024-02-01
Payer: COMMERCIAL

## 2024-02-01 VITALS
RESPIRATION RATE: 18 BRPM | HEART RATE: 81 BPM | BODY MASS INDEX: 35.71 KG/M2 | DIASTOLIC BLOOD PRESSURE: 88 MMHG | SYSTOLIC BLOOD PRESSURE: 157 MMHG | HEIGHT: 77 IN | WEIGHT: 302.47 LBS | TEMPERATURE: 97.3 F | OXYGEN SATURATION: 96 %

## 2024-02-01 DIAGNOSIS — R79.1 ELEVATED INR: Primary | ICD-10-CM

## 2024-02-01 DIAGNOSIS — R79.1 ELEVATED INR: ICD-10-CM

## 2024-02-01 LAB
ALBUMIN SERPL-MCNC: 4.7 G/DL (ref 3.5–5.2)
ALBUMIN/GLOB SERPL: 1.4 G/DL
ALP SERPL-CCNC: 77 U/L (ref 39–117)
ALT SERPL W P-5'-P-CCNC: 24 U/L (ref 1–41)
ANION GAP SERPL CALCULATED.3IONS-SCNC: 15.2 MMOL/L (ref 5–15)
ANISOCYTOSIS BLD QL: ABNORMAL
AST SERPL-CCNC: 21 U/L (ref 1–40)
BILIRUB SERPL-MCNC: 1.4 MG/DL (ref 0–1.2)
BUN SERPL-MCNC: 10 MG/DL (ref 6–20)
BUN/CREAT SERPL: 11.1 (ref 7–25)
CALCIUM SPEC-SCNC: 10.1 MG/DL (ref 8.6–10.5)
CHLORIDE SERPL-SCNC: 101 MMOL/L (ref 98–107)
CO2 SERPL-SCNC: 23.8 MMOL/L (ref 22–29)
CREAT SERPL-MCNC: 0.9 MG/DL (ref 0.76–1.27)
DEPRECATED RDW RBC AUTO: 39.5 FL (ref 37–54)
EGFRCR SERPLBLD CKD-EPI 2021: 113.5 ML/MIN/1.73
ERYTHROCYTE [DISTWIDTH] IN BLOOD BY AUTOMATED COUNT: 12.6 % (ref 12.3–15.4)
GLOBULIN UR ELPH-MCNC: 3.3 GM/DL
GLUCOSE SERPL-MCNC: 87 MG/DL (ref 65–99)
HCT VFR BLD AUTO: 49.5 % (ref 37.5–51)
HGB BLD-MCNC: 17.4 G/DL (ref 13–17.7)
INR PPP: 1.2 (ref 0.86–1.15)
LYMPHOCYTES # BLD MANUAL: 3.16 10*3/MM3 (ref 0.7–3.1)
LYMPHOCYTES NFR BLD MANUAL: 6 % (ref 5–12)
MCH RBC QN AUTO: 30.5 PG (ref 26.6–33)
MCHC RBC AUTO-ENTMCNC: 35.2 G/DL (ref 31.5–35.7)
MCV RBC AUTO: 86.8 FL (ref 79–97)
MONOCYTES # BLD: 0.46 10*3/MM3 (ref 0.1–0.9)
NEUTROPHILS # BLD AUTO: 4.09 10*3/MM3 (ref 1.7–7)
NEUTROPHILS NFR BLD MANUAL: 53 % (ref 42.7–76)
PATHOLOGY REVIEW: YES
PLATELET # BLD AUTO: 243 10*3/MM3 (ref 140–450)
PMV BLD AUTO: 13.4 FL (ref 6–12)
POTASSIUM SERPL-SCNC: 3.5 MMOL/L (ref 3.5–5.2)
PROT SERPL-MCNC: 8 G/DL (ref 6–8.5)
PROTHROMBIN TIME: 15.5 SECONDS (ref 11.8–14.9)
RBC # BLD AUTO: 5.7 10*6/MM3 (ref 4.14–5.8)
SMALL PLATELETS BLD QL SMEAR: ADEQUATE
SODIUM SERPL-SCNC: 140 MMOL/L (ref 136–145)
VARIANT LYMPHS NFR BLD MANUAL: 41 % (ref 19.6–45.3)
WBC MORPH BLD: NORMAL
WBC NRBC COR # BLD AUTO: 7.71 10*3/MM3 (ref 3.4–10.8)

## 2024-02-01 PROCEDURE — G0463 HOSPITAL OUTPT CLINIC VISIT: HCPCS | Performed by: NURSE PRACTITIONER

## 2024-02-01 PROCEDURE — 80053 COMPREHEN METABOLIC PANEL: CPT

## 2024-02-01 PROCEDURE — 85610 PROTHROMBIN TIME: CPT | Performed by: NURSE PRACTITIONER

## 2024-02-01 PROCEDURE — 36415 COLL VENOUS BLD VENIPUNCTURE: CPT | Performed by: NURSE PRACTITIONER

## 2024-02-01 PROCEDURE — 85027 COMPLETE CBC AUTOMATED: CPT | Performed by: NURSE PRACTITIONER

## 2024-02-01 NOTE — PROGRESS NOTES
Chief Complaint/Reason for Referral:  Elevated INR    Onesimo Thayer*  Onesimo Thayer Jr., MD    Records Obtained:  Records of the patients history including those obtained from  [facility] and patient information  were reviewed and summarized in detail.    Subjective    History of present illness:   Mr. Vance Lorenzo presents for new patient evaluation for elevated INR. He has has history of Atrial Fib for which he takes Eliquis for BID since around 2018. Other medical history includes HLD, NANETTE, obesity.     Lab work shows PT of 15.9, INR of 1.24 on 12/6/2023. 5/8/2023: PT of 15.5 and INR of 1.22. INR of 1.16 in February of 2023.     He denies any acute bleeding episodes or extensive bruising. He does reports when an intermittent nose bleed sometimes when he hits his gag reflex with brushing his teeth. He reports bleeding usually stops with pressure.         Oncology/Hematology History    No history exists.       Review of Systems   Constitutional:  Negative for appetite change, diaphoresis, fatigue, fever, unexpected weight gain and unexpected weight loss.   HENT:  Negative for hearing loss, mouth sores, sore throat, swollen glands, trouble swallowing and voice change.    Eyes:  Negative for blurred vision.   Respiratory:  Negative for cough, shortness of breath and wheezing.    Cardiovascular:  Negative for chest pain and palpitations.   Gastrointestinal:  Negative for abdominal pain, blood in stool, constipation, diarrhea, nausea and vomiting.   Endocrine: Negative for cold intolerance and heat intolerance.   Genitourinary:  Negative for difficulty urinating, dysuria, frequency, hematuria and urinary incontinence.   Musculoskeletal:  Negative for arthralgias, back pain and myalgias.   Skin:  Negative for rash, skin lesions and wound.   Neurological:  Negative for dizziness, seizures, weakness, numbness and headache.   Hematological:  Does not bruise/bleed easily.   Psychiatric/Behavioral:   Negative for depressed mood. The patient is not nervous/anxious.    All other systems reviewed and are negative.      Current Outpatient Medications on File Prior to Visit   Medication Sig Dispense Refill    dilTIAZem XR (DILACOR XR) 240 MG 24 hr capsule TAKE ONE CAPSULE BY MOUTH DAILY 90 capsule 3    Eliquis 5 MG tablet tablet TAKE ONE TABLET BY MOUTH TWICE DAILY 180 tablet 3    flecainide (TAMBOCOR) 100 MG tablet Take 1 tablet by mouth Every Other Day. 45 tablet 3    hydroCHLOROthiazide (HYDRODIURIL) 25 MG tablet TAKE ONE TABLET BY MOUTH EVERY DAY 90 tablet 1    loteprednol (LOTEMAX) 0.5 % ophthalmic suspension INSTILL 1 DROP IN EACH EYE FOUR TIMES DAILY      rosuvastatin (CRESTOR) 5 MG tablet Take 1 tablet by mouth Every Night. 90 tablet 3    fluticasone (FLONASE) 50 MCG/ACT nasal spray 2 sprays into the nostril(s) as directed by provider Daily. (Patient not taking: Reported on 2/1/2024) 16 g 0    Xiidra 5 % ophthalmic solution INSTILL 1 DROP IN EACH EYE TWICE DAILY (Patient not taking: Reported on 2/1/2024)       No current facility-administered medications on file prior to visit.       Allergies   Allergen Reactions    Metoprolol Tinnitus     Past Medical History:   Diagnosis Date    Essential hypertension 12/01/2021    Hyperlipidemia LDL goal <100 12/01/2021    Paroxysmal atrial fibrillation 12/01/2021    Tinnitus     Xerosis of skin 08/17/2018     History reviewed. No pertinent surgical history.  Social History     Socioeconomic History    Marital status: Single   Tobacco Use    Smoking status: Never     Passive exposure: Never    Smokeless tobacco: Never   Vaping Use    Vaping Use: Never used   Substance and Sexual Activity    Alcohol use: Never    Drug use: Never    Sexual activity: Defer     Family History   Problem Relation Age of Onset    Breast cancer Maternal Grandmother         50s    Cancer Paternal Grandmother     Cancer Paternal Grandfather     Heart failure Paternal Grandfather      Immunization  "History   Administered Date(s) Administered    Hep B, Adolescent or Pediatric 02/25/1999    MMR 07/29/1998    Td (TDVAX) 02/25/1999       Tobacco Use: Low Risk  (2/1/2024)    Patient History     Smoking Tobacco Use: Never     Smokeless Tobacco Use: Never     Passive Exposure: Never       Objective     Physical Exam  Vitals and nursing note reviewed.   Constitutional:       Appearance: Normal appearance. He is obese.   HENT:      Head: Normocephalic.      Nose: Nose normal.      Mouth/Throat:      Mouth: Mucous membranes are moist.   Eyes:      Pupils: Pupils are equal, round, and reactive to light.   Cardiovascular:      Rate and Rhythm: Normal rate and regular rhythm.      Pulses: Normal pulses.      Heart sounds: Normal heart sounds.   Pulmonary:      Effort: Pulmonary effort is normal. No respiratory distress.      Breath sounds: Normal breath sounds. No wheezing, rhonchi or rales.   Abdominal:      General: Bowel sounds are normal. There is no distension.      Palpations: Abdomen is soft.   Musculoskeletal:         General: Normal range of motion.      Cervical back: Normal range of motion and neck supple.   Skin:     General: Skin is warm and dry.      Capillary Refill: Capillary refill takes less than 2 seconds.   Neurological:      General: No focal deficit present.      Mental Status: He is alert and oriented to person, place, and time.   Psychiatric:         Mood and Affect: Mood normal.         Behavior: Behavior normal.         Thought Content: Thought content normal.         Judgment: Judgment normal.         Vitals:    02/01/24 1439   BP: 157/88   Pulse: 81   Resp: 18   Temp: 97.3 °F (36.3 °C)   SpO2: 96%   Weight: (!) 137 kg (302 lb 7.5 oz)   Height: 195.6 cm (77\")   PainSc: 0-No pain       Wt Readings from Last 3 Encounters:   02/01/24 (!) 137 kg (302 lb 7.5 oz)   01/17/24 (!) 140 kg (309 lb)   11/09/23 (!) 139 kg (305 lb 9.6 oz)                 ECOG score: 0         ECOG: (0) Fully Active - Able to " "Carry On All Pre-disease Performance Without Restriction  Fall Risk Assessment was completed, and patient is at low risk for falls.  PHQ-9 Total Score: 0       The patient is not  experiencing fatigue. Fatigue score: 0    PT/OT Functional Screening: PT fx screen : No needs identified  Speech Functional Screening: Speech fx screen : No needs identified  Rehab to be ordered: Rehab to be ordered : No needs identified        Result Review :  The following data was reviewed by: MARSHA Ambriz on 02/01/2024:  Lab Results   Component Value Date    HGB 16.4 12/06/2023    HCT 46.9 12/06/2023    MCV 89.3 12/06/2023     12/06/2023    WBC 8.31 12/06/2023    NEUTROABS 4.18 12/06/2023    LYMPHSABS 3.28 (H) 12/06/2023    MONOSABS 0.72 12/06/2023    EOSABS 0.06 12/06/2023    BASOSABS 0.05 12/06/2023     Lab Results   Component Value Date    GLUCOSE 91 12/06/2023    BUN 5 (L) 12/06/2023    CREATININE 0.83 12/06/2023     12/06/2023    K 3.7 12/06/2023     12/06/2023    CO2 25.8 12/06/2023    CALCIUM 9.6 12/06/2023    PROTEINTOT 7.3 12/06/2023    ALBUMIN 4.6 12/06/2023    BILITOT 0.9 12/06/2023    ALKPHOS 67 12/06/2023    AST 21 12/06/2023    ALT 20 12/06/2023        No results found for: \"IRON\", \"LABIRON\", \"TRANSFERRIN\", \"TIBC\"  No results found for: \"LDH\", \"FERRITIN\", \"[id number]\", \"FOLATE\"  No results found for: \"PSA\", \"CEA\", \"AFP\", \"\", \"\"    No Images in the past 120 days found..         Assessment and Plan:  Diagnoses and all orders for this visit:    1. Elevated INR (Primary)  -     Peripheral Blood Smear  -     CBC (No Diff)  -     Protime-INR  -     Comprehensive Metabolic Panel; Future    Although there has been concern for elevation in the INR level with the use of Eliquis and the prolongation of the prothrombin time and the INR ratio in a literature review.  DOACs do not require any monitoring and the literature surrounding the extent of elevation and the clinical significance is " limited. The literature does not support monitoring INR as a marker of apixaban activity. Given the mild elevation of the INR level of 1.16 to to 1.24 and he is not having having any persistent acute blood loss daily or persistent bruising, would not recommend any change in his treatment regimen of his Eliquis and would not recommend routine monitoring of the INR level. CMP has been normal as well with no evidence for increased liver enzymes.     No further follow up is needed at this time unless he continues to have any acute or chronic bleeding issues.     Discussed with Dr. Etienne.     I spent 20 minutes caring for Vance on this date of service. This time includes time spent by me in the following activities:preparing for the visit, reviewing tests, obtaining and/or reviewing a separately obtained history, performing a medically appropriate examination and/or evaluation , counseling and educating the patient/family/caregiver, ordering medications, tests, or procedures, referring and communicating with other health care professionals , documenting information in the medical record, independently interpreting results and communicating that information with the patient/family/caregiver, and care coordination    Patient Follow Up: PRN    Patient was given instructions and counseling regarding his condition or for health maintenance advice. Please see specific information pulled into the AVS if appropriate.     Clara Hanson, APRN    2/1/2024    .tob

## 2024-02-02 LAB
CYTO UR: NORMAL
LAB AP CASE REPORT: NORMAL
LAB AP CLINICAL INFORMATION: NORMAL
PATH REPORT.FINAL DX SPEC: NORMAL
PATH REPORT.GROSS SPEC: NORMAL

## 2024-02-29 ENCOUNTER — HOSPITAL ENCOUNTER (INPATIENT)
Facility: HOSPITAL | Age: 39
LOS: 1 days | Discharge: HOME | DRG: 310 | End: 2024-03-01
Attending: EMERGENCY MEDICINE | Admitting: INTERNAL MEDICINE
Payer: COMMERCIAL

## 2024-02-29 ENCOUNTER — APPOINTMENT (OUTPATIENT)
Dept: GENERAL RADIOLOGY | Facility: HOSPITAL | Age: 39
DRG: 310 | End: 2024-02-29
Payer: COMMERCIAL

## 2024-02-29 DIAGNOSIS — I48.0 PAROXYSMAL ATRIAL FIBRILLATION: ICD-10-CM

## 2024-02-29 DIAGNOSIS — I48.92 ATRIAL FLUTTER WITH RAPID VENTRICULAR RESPONSE: Primary | ICD-10-CM

## 2024-02-29 PROBLEM — I48.91 ATRIAL FIBRILLATION WITH RVR: Status: ACTIVE | Noted: 2024-02-29

## 2024-02-29 LAB
ALBUMIN SERPL-MCNC: 4.4 G/DL (ref 3.5–5.2)
ALBUMIN/GLOB SERPL: 1.5 G/DL
ALP SERPL-CCNC: 74 U/L (ref 39–117)
ALT SERPL W P-5'-P-CCNC: 19 U/L (ref 1–41)
ANION GAP SERPL CALCULATED.3IONS-SCNC: 10.9 MMOL/L (ref 5–15)
AST SERPL-CCNC: 17 U/L (ref 1–40)
BASOPHILS # BLD AUTO: 0.04 10*3/MM3 (ref 0–0.2)
BASOPHILS NFR BLD AUTO: 0.6 % (ref 0–1.5)
BILIRUB SERPL-MCNC: 1.2 MG/DL (ref 0–1.2)
BUN SERPL-MCNC: 8 MG/DL (ref 6–20)
BUN/CREAT SERPL: 8.8 (ref 7–25)
CALCIUM SPEC-SCNC: 9.7 MG/DL (ref 8.6–10.5)
CHLORIDE SERPL-SCNC: 100 MMOL/L (ref 98–107)
CO2 SERPL-SCNC: 29.1 MMOL/L (ref 22–29)
CREAT SERPL-MCNC: 0.91 MG/DL (ref 0.76–1.27)
DEPRECATED RDW RBC AUTO: 39.5 FL (ref 37–54)
EGFRCR SERPLBLD CKD-EPI 2021: 112 ML/MIN/1.73
EOSINOPHIL # BLD AUTO: 0.05 10*3/MM3 (ref 0–0.4)
EOSINOPHIL NFR BLD AUTO: 0.8 % (ref 0.3–6.2)
ERYTHROCYTE [DISTWIDTH] IN BLOOD BY AUTOMATED COUNT: 12.2 % (ref 12.3–15.4)
GEN 5 2HR TROPONIN T REFLEX: 9 NG/L
GLOBULIN UR ELPH-MCNC: 3 GM/DL
GLUCOSE SERPL-MCNC: 110 MG/DL (ref 65–99)
HCT VFR BLD AUTO: 48.2 % (ref 37.5–51)
HGB BLD-MCNC: 16.7 G/DL (ref 13–17.7)
HOLD SPECIMEN: NORMAL
HOLD SPECIMEN: NORMAL
IMM GRANULOCYTES # BLD AUTO: 0.01 10*3/MM3 (ref 0–0.05)
IMM GRANULOCYTES NFR BLD AUTO: 0.2 % (ref 0–0.5)
LIPASE SERPL-CCNC: 31 U/L (ref 13–60)
LYMPHOCYTES # BLD AUTO: 2.04 10*3/MM3 (ref 0.7–3.1)
LYMPHOCYTES NFR BLD AUTO: 31.3 % (ref 19.6–45.3)
MAGNESIUM SERPL-MCNC: 1.9 MG/DL (ref 1.6–2.6)
MCH RBC QN AUTO: 30.5 PG (ref 26.6–33)
MCHC RBC AUTO-ENTMCNC: 34.6 G/DL (ref 31.5–35.7)
MCV RBC AUTO: 88 FL (ref 79–97)
MONOCYTES # BLD AUTO: 0.43 10*3/MM3 (ref 0.1–0.9)
MONOCYTES NFR BLD AUTO: 6.6 % (ref 5–12)
NEUTROPHILS NFR BLD AUTO: 3.95 10*3/MM3 (ref 1.7–7)
NEUTROPHILS NFR BLD AUTO: 60.5 % (ref 42.7–76)
NRBC BLD AUTO-RTO: 0 /100 WBC (ref 0–0.2)
NT-PROBNP SERPL-MCNC: 201.7 PG/ML (ref 0–450)
PLATELET # BLD AUTO: 223 10*3/MM3 (ref 140–450)
PMV BLD AUTO: 11.1 FL (ref 6–12)
POTASSIUM SERPL-SCNC: 3.7 MMOL/L (ref 3.5–5.2)
PROT SERPL-MCNC: 7.4 G/DL (ref 6–8.5)
QT INTERVAL: 353 MS
QT INTERVAL: 353 MS
QT INTERVAL: 378 MS
QTC INTERVAL: 421 MS
QTC INTERVAL: 483 MS
QTC INTERVAL: 514 MS
RBC # BLD AUTO: 5.48 10*6/MM3 (ref 4.14–5.8)
SODIUM SERPL-SCNC: 140 MMOL/L (ref 136–145)
TROPONIN T DELTA: 1 NG/L
TROPONIN T SERPL HS-MCNC: 8 NG/L
WBC NRBC COR # BLD AUTO: 6.52 10*3/MM3 (ref 3.4–10.8)
WHOLE BLOOD HOLD COAG: NORMAL
WHOLE BLOOD HOLD SPECIMEN: NORMAL

## 2024-02-29 PROCEDURE — 83735 ASSAY OF MAGNESIUM: CPT

## 2024-02-29 PROCEDURE — 93010 ELECTROCARDIOGRAM REPORT: CPT | Performed by: INTERNAL MEDICINE

## 2024-02-29 PROCEDURE — 83690 ASSAY OF LIPASE: CPT

## 2024-02-29 PROCEDURE — 80053 COMPREHEN METABOLIC PANEL: CPT

## 2024-02-29 PROCEDURE — 96366 THER/PROPH/DIAG IV INF ADDON: CPT

## 2024-02-29 PROCEDURE — 99223 1ST HOSP IP/OBS HIGH 75: CPT | Performed by: INTERNAL MEDICINE

## 2024-02-29 PROCEDURE — 83880 ASSAY OF NATRIURETIC PEPTIDE: CPT

## 2024-02-29 PROCEDURE — 94799 UNLISTED PULMONARY SVC/PX: CPT

## 2024-02-29 PROCEDURE — 99291 CRITICAL CARE FIRST HOUR: CPT

## 2024-02-29 PROCEDURE — 94660 CPAP INITIATION&MGMT: CPT

## 2024-02-29 PROCEDURE — 85025 COMPLETE CBC W/AUTO DIFF WBC: CPT

## 2024-02-29 PROCEDURE — 99222 1ST HOSP IP/OBS MODERATE 55: CPT | Performed by: INTERNAL MEDICINE

## 2024-02-29 PROCEDURE — 93005 ELECTROCARDIOGRAM TRACING: CPT

## 2024-02-29 PROCEDURE — 84484 ASSAY OF TROPONIN QUANT: CPT | Performed by: EMERGENCY MEDICINE

## 2024-02-29 PROCEDURE — 36415 COLL VENOUS BLD VENIPUNCTURE: CPT

## 2024-02-29 PROCEDURE — 71045 X-RAY EXAM CHEST 1 VIEW: CPT

## 2024-02-29 PROCEDURE — 99285 EMERGENCY DEPT VISIT HI MDM: CPT

## 2024-02-29 PROCEDURE — 93005 ELECTROCARDIOGRAM TRACING: CPT | Performed by: EMERGENCY MEDICINE

## 2024-02-29 PROCEDURE — 96365 THER/PROPH/DIAG IV INF INIT: CPT

## 2024-02-29 PROCEDURE — 84484 ASSAY OF TROPONIN QUANT: CPT

## 2024-02-29 RX ORDER — SODIUM CHLORIDE 0.9 % (FLUSH) 0.9 %
10 SYRINGE (ML) INJECTION AS NEEDED
Status: DISCONTINUED | OUTPATIENT
Start: 2024-02-29 | End: 2024-03-01 | Stop reason: HOSPADM

## 2024-02-29 RX ORDER — ASPIRIN 81 MG/1
324 TABLET, CHEWABLE ORAL ONCE
Status: DISCONTINUED | OUTPATIENT
Start: 2024-02-29 | End: 2024-02-29

## 2024-02-29 RX ORDER — CALCIUM CARBONATE 500 MG/1
2 TABLET, CHEWABLE ORAL 2 TIMES DAILY PRN
Status: DISCONTINUED | OUTPATIENT
Start: 2024-02-29 | End: 2024-03-01 | Stop reason: HOSPADM

## 2024-02-29 RX ORDER — ROSUVASTATIN CALCIUM 5 MG/1
5 TABLET, COATED ORAL NIGHTLY
Status: DISCONTINUED | OUTPATIENT
Start: 2024-02-29 | End: 2024-03-01 | Stop reason: HOSPADM

## 2024-02-29 RX ORDER — DILTIAZEM HYDROCHLORIDE 240 MG/1
240 CAPSULE, COATED, EXTENDED RELEASE ORAL
Status: DISCONTINUED | OUTPATIENT
Start: 2024-02-29 | End: 2024-03-01

## 2024-02-29 RX ORDER — SODIUM CHLORIDE 0.9 % (FLUSH) 0.9 %
10 SYRINGE (ML) INJECTION EVERY 12 HOURS SCHEDULED
Status: DISCONTINUED | OUTPATIENT
Start: 2024-02-29 | End: 2024-03-01 | Stop reason: HOSPADM

## 2024-02-29 RX ORDER — SODIUM CHLORIDE 9 MG/ML
40 INJECTION, SOLUTION INTRAVENOUS AS NEEDED
Status: DISCONTINUED | OUTPATIENT
Start: 2024-02-29 | End: 2024-03-01 | Stop reason: HOSPADM

## 2024-02-29 RX ORDER — FLECAINIDE ACETATE 50 MG/1
100 TABLET ORAL EVERY 12 HOURS SCHEDULED
Status: DISCONTINUED | OUTPATIENT
Start: 2024-02-29 | End: 2024-03-01 | Stop reason: HOSPADM

## 2024-02-29 RX ORDER — CHOLECALCIFEROL (VITAMIN D3) 125 MCG
5 CAPSULE ORAL NIGHTLY PRN
Status: DISCONTINUED | OUTPATIENT
Start: 2024-02-29 | End: 2024-03-01 | Stop reason: HOSPADM

## 2024-02-29 RX ORDER — ONDANSETRON 2 MG/ML
4 INJECTION INTRAMUSCULAR; INTRAVENOUS EVERY 4 HOURS PRN
Status: DISCONTINUED | OUTPATIENT
Start: 2024-02-29 | End: 2024-03-01 | Stop reason: HOSPADM

## 2024-02-29 RX ORDER — PREDNISOLONE ACETATE 10 MG/ML
2 SUSPENSION/ DROPS OPHTHALMIC EVERY 6 HOURS SCHEDULED
Status: DISCONTINUED | OUTPATIENT
Start: 2024-02-29 | End: 2024-03-01 | Stop reason: HOSPADM

## 2024-02-29 RX ORDER — FLECAINIDE ACETATE 50 MG/1
100 TABLET ORAL EVERY OTHER DAY
Status: DISCONTINUED | OUTPATIENT
Start: 2024-03-01 | End: 2024-02-29

## 2024-02-29 RX ORDER — ACETAMINOPHEN 325 MG/1
650 TABLET ORAL EVERY 4 HOURS PRN
Status: DISCONTINUED | OUTPATIENT
Start: 2024-02-29 | End: 2024-03-01 | Stop reason: HOSPADM

## 2024-02-29 RX ORDER — DILTIAZEM HCL IN NACL,ISO-OSM 125 MG/125
5-15 PLASTIC BAG, INJECTION (ML) INTRAVENOUS
Status: DISCONTINUED | OUTPATIENT
Start: 2024-02-29 | End: 2024-03-01

## 2024-02-29 RX ADMIN — PREDNISOLONE ACETATE 2 DROP: 10 SUSPENSION/ DROPS OPHTHALMIC at 17:00

## 2024-02-29 RX ADMIN — ROSUVASTATIN CALCIUM 5 MG: 5 TABLET, FILM COATED ORAL at 20:56

## 2024-02-29 RX ADMIN — DILTIAZEM HYDROCHLORIDE 240 MG: 240 CAPSULE, COATED, EXTENDED RELEASE ORAL at 16:59

## 2024-02-29 RX ADMIN — APIXABAN 5 MG: 5 TABLET, FILM COATED ORAL at 20:55

## 2024-02-29 RX ADMIN — Medication 10 ML: at 20:56

## 2024-02-29 RX ADMIN — FLECAINIDE ACETATE 100 MG: 50 TABLET ORAL at 20:55

## 2024-02-29 RX ADMIN — ACETAMINOPHEN 650 MG: 325 TABLET ORAL at 18:08

## 2024-02-29 RX ADMIN — PREDNISOLONE ACETATE 2 DROP: 10 SUSPENSION/ DROPS OPHTHALMIC at 23:53

## 2024-02-29 RX ADMIN — DILTIAZEM HYDROCHLORIDE 5 MG/HR: 5 INJECTION INTRAVENOUS at 13:16

## 2024-02-29 NOTE — PAYOR COMM NOTE
"UTILIZATION REVIEW CONTACT INFORMATION  Phone:      [phone]  Fax:           [phone]      RENDERING FACILITY  Name:  [facility]   NPI:  [id number]  TID:  [id number]  [place]      [place] [address]  Phone  [phone]        PATIENT INFORMATION  Name:  Vance Banks  MRN#:     [id number]  :  1985         ADMISSION INFORMATION  CLASS: Inpatient   DOS:  24        ADMISSION DIAGNOSIS  Atrial fibrillation with RVR [I48.91]        ++++++++++++++++++++++++++++++++++++++++++++++++++++++++++++++++++++++++++++++++          Vance Banks (38 y.o. Male)       Date of Birth   1985    Social Security Number       Address   [address] [place] [address]    Home Phone   [phone]    MRN   [id number]       Gnosticism   Gnosticist    Marital Status   Single                            Admission Date   24    Admission Type   Emergency    Admitting Provider   Ej Luna MD    Attending Provider   Jose Carlos Salazar MD    Department, Room/Bed   [facility] EMERGENCY ROOM,        Discharge Date       Discharge Disposition       Discharge Destination                                 Attending Provider: Jose Carlos Salazar MD    Allergies: Metoprolol    Isolation: None   Infection: None   Code Status: CPR    Ht: 195.6 cm (77\")   Wt: 137 kg (302 lb 0.5 oz)    Admission Cmt: None   Principal Problem: Atrial fibrillation with RVR [I48.91]                   Active Insurance as of 2024       Primary Coverage       Payor Plan Insurance Group Employer/Plan Group    [facility] BY KELLY [facility] BY KELLY [id number]       Payor Plan Address Payor Plan Phone Number Payor Plan Fax Number Effective Dates    [address] [address]   2021 - None Entered    [facility] [id number]         Subscriber Name Subscriber Birth Date Member ID       VANCE BANKS 1985 [id number]                     Emergency " Contacts        (Rel.) Home Phone Work Phone Mobile Phone    SATURNINO BANKS (Mother) -- -- [phone]               Atrial Fibrillation RRG Inpatient Care       Indications Met   Last updated by Garima Howard on 2024 1519     Review Status Created By   Primary Completed Garima Howard      Criteria Review   Atrial Fibrillation RRG Inpatient Care     Overall Determination: Indications Met     Criteria:  [×] Admission is indicated for  1 or more  of the following :      [×] Initiation or adjustment of antiarrhythmic medication that requires cardiac monitoring (eg, telemetry), as indicated by  ALL  of the following :          [×] Cardiac monitoring (eg, telemetry) appropriate, as indicated by  1 or more  of the following :              [×] Prolonged QT interval                  2024  3:19 PM                      -- 2024  3:19 PM by Garima Howard --                          Patient on Flecanide, QTcB= 483  ms          [×] Cardiac monitoring required that extends beyond observation care time frame [F]     Notes:  -- 2024  3:19 PM by Garima Howard --            *       c/o palpitations      *       Hx of AFIB on Diltiazem PO. Eliquis and Flecainide at home      *       HR 86 up to 149      *       EKG Atrial fibrillation      Borderline prolonged QT interval      When compared with ECG of 2024 12:15:22,      Significant repolarization change      QTcB= 483 ms      *       CP 5/10      *       HS Trop negative      *             *       WBC normal      *       RADS no acute process                  History & Physical        Ej Luna MD at 24 [address] [place], [place] [address]IST HISTORY AND PHYSICAL  Date: 2024   Patient Name: Vance Banks  : 1985  MRN: [id number]  Primary Care Physician:  Onesimo Thayer Jr., MD  Date of admission: 2024    Subjective  Subjective     Chief Complaint: Palpitations,  shortness of breath    HPI:    Vance Lorenzo is a 38 y.o. male PMH HTN, HLD, and breath and palpitations.  Patient states his symptoms actually started last night around 7 PM where he felt his heart was racing and was irregular.  He states it improved and seem to calm down around 1230 or 1 and he went to sleep.  This morning, he still felt intermittent palpitations.  He called his primary cardiologist office to recommended he go to the ED.  In the ED he was found to be in A-fib with RVR with rates up to the 160s.  He was given a push of diltiazem, started on diltiazem drip and admission was requested.  Currently, his heart rate is in the high 90s and he states his palpitations feel somewhat better.  He denies any recent illness, fever, chills, URI symptoms.    Personal History     Past Medical History:  Past Medical History:   Diagnosis Date    Essential hypertension 12/01/2021    Hyperlipidemia LDL goal <100 12/01/2021    Paroxysmal atrial fibrillation 12/01/2021    Tinnitus     Xerosis of skin 08/17/2018       Past Surgical History:  History reviewed. No pertinent surgical history.    Family History:   Family History   Problem Relation Age of Onset    Breast cancer Maternal Grandmother         50s    Cancer Paternal Grandmother     Cancer Paternal Grandfather     Heart failure Paternal Grandfather         Social History:   Social History     Socioeconomic History    Marital status: Single   Tobacco Use    Smoking status: Never     Passive exposure: Never    Smokeless tobacco: Never   Vaping Use    Vaping Use: Never used   Substance and Sexual Activity    Alcohol use: Never    Drug use: Never    Sexual activity: Defer        Home Medications:  Lifitegrast, apixaban, dilTIAZem XR, flecainide, fluticasone, hydroCHLOROthiazide, loteprednol, and rosuvastatin    Allergies:  Allergies   Allergen Reactions    Metoprolol Tinnitus       Review of Systems   A 14 point review of systems was obtained and otherwise  negative unless stated in the HPI    Objective  Objective     Vitals:   Temp:  [98.9 °F (37.2 °C)] 98.9 °F (37.2 °C)  Heart Rate:  [] 149  Resp:  [18] 18  BP: (105-151)/() 133/91    Physical Exam    Constitutional: Awake, alert, no acute distress   HENT: NCAT, mucous membranes moist   Respiratory: Clear to auscultation bilaterally, nonlabored respirations    Cardiovascular: Irregularly irregular, tachycardic, no murmurs, rubs, or gallops, palpable pedal pulses bilaterally   Gastrointestinal: Positive bowel sounds, soft, nontender, nondistended   Musculoskeletal: No bilateral ankle edema, no clubbing or cyanosis to extremities   Psychiatric: Appropriate affect, cooperative   Neurologic: Oriented x 3, strength symmetric in all extremities, Cranial Nerves grossly intact to confrontation, speech clear   Skin: No rashes     Result Review   Result Review:  I have personally reviewed the results from the time of this admission to 2/29/2024 14:14 EST and agree with these findings:  [x]  Laboratory personally viewed CMP, CBC, magnesium, troponin  CBC          12/6/2023    15:25 2/1/2024    15:13 2/29/2024    11:20   CBC   WBC 8.31  7.71  6.52    RBC 5.25  5.70  5.48    Hemoglobin 16.4  17.4  16.7    Hematocrit 46.9  49.5  48.2    MCV 89.3  86.8  88.0    MCH 31.2  30.5  30.5    MCHC 35.0  35.2  34.6    RDW 12.2  12.6  12.2    Platelets 227  243  223      CMP          12/6/2023    15:25 2/1/2024    15:13 2/29/2024    11:20   CMP   Glucose 91  87  110    BUN 5  10  8    Creatinine 0.83  0.90  0.91    EGFR 116.3  113.5  112.0    Sodium 142  140  140    Potassium 3.7  3.5  3.7    Chloride 103  101  100    Calcium 9.6  10.1  9.7    Total Protein 7.3  8.0  7.4    Albumin 4.6  4.7  4.4    Globulin 2.7  3.3  3.0    Total Bilirubin 0.9  1.4  1.2    Alkaline Phosphatase 67  77  74    AST (SGOT) 21  21  17    ALT (SGPT) 20  24  19    Albumin/Globulin Ratio 1.7  1.4  1.5    BUN/Creatinine Ratio 6.0  11.1  8.8    Anion Gap  13.2  15.2  10.9      []  Microbiology  []  Radiology  [x]  EKG/Telemetry Telemetry personally reviewed  []  Cardiology/Vascular   []  Pathology  []  Old records  []  Other:      Assessment & Plan  Assessment / Plan     Assessment/Plan:   Paroxysmal atrial fibrillation with RVR  Hypertension  Hyperlipidemia  NANETTE compliant with CPAP    Admit to the hospital on telemetry for workup and management of the above  Consult cardiology-appreciate assistance  Restart home oral diltiazem and flecainide  Continue diltiazem drip, wean as tolerated.  Monitor electrolytes vital signs closely while on high risk drip  Restart Eliquis 5 mg twice daily  Restart home simvastatin  Ordered CPAP nightly with naps  Trend renal function and electrolytes with a.m. BMP, magnesium   Trend Hgb and WBC with a.m. CBC    Discussed case with: ED physician, bedside RN, cardiology    DVT prophylaxis:  Medical DVT prophylaxis orders are present.      CODE STATUS:    Level Of Support Discussed With: Patient  Code Status (Patient has no pulse and is not breathing): CPR (Attempt to Resuscitate)  Medical Interventions (Patient has pulse or is breathing): Full Support      Electronically signed by Ej Chapin MD, 02/29/24, 2:14 PM EST.             Electronically signed by Ej Luna MD at 02/29/24 1418          Emergency Department Notes        Jose Carlos Salazar MD at 02/29/24 1230          Time: 12:30 PM EST  Date of encounter:  2/29/2024  Independent Historian/Clinical History and Information was obtained by:   Patient    History is limited by: N/A    Chief Complaint: Palpitations      History of Present Illness:  Patient is a 38 y.o. year old male with h/o Afib who presents to the emergency department for evaluation of palpitations that started between 9&10 pm last night. Pt also states he had some mild shortness of breath, nausea, and a brief sharp pain in his chest that went into his left arm. Pt is on diltiazem once daily, eliquis  bid, and flecainide every other day for his Afib. He does state he is supposed to avoid caffeine, but did drink a couple sodas in the last few days. Pt sees Dr. Castellano, he called their office this morning and they recommended ER evaluation.     HPI    Patient Care Team  Primary Care Provider: Onesimo Thayer Jr., MD    Past Medical History:     Allergies   Allergen Reactions    Metoprolol Tinnitus     Past Medical History:   Diagnosis Date    Essential hypertension 12/01/2021    Hyperlipidemia LDL goal <100 12/01/2021    Paroxysmal atrial fibrillation 12/01/2021    Tinnitus     Xerosis of skin 08/17/2018     History reviewed. No pertinent surgical history.  Family History   Problem Relation Age of Onset    Breast cancer Maternal Grandmother         50s    Cancer Paternal Grandmother     Cancer Paternal Grandfather     Heart failure Paternal Grandfather        Home Medications:  Prior to Admission medications    Medication Sig Start Date End Date Taking? Authorizing Provider   dilTIAZem XR (DILACOR XR) 240 MG 24 hr capsule TAKE ONE CAPSULE BY MOUTH DAILY 7/19/23   Onesimo Thayer Jr., MD   Eliquis 5 MG tablet tablet TAKE ONE TABLET BY MOUTH TWICE DAILY 9/12/23   Damien Castellano MD   flecainide (TAMBOCOR) 100 MG tablet Take 1 tablet by mouth Every Other Day. 1/17/24   Samia Veliz APRN   fluticasone (FLONASE) 50 MCG/ACT nasal spray 2 sprays into the nostril(s) as directed by provider Daily.  Patient not taking: Reported on 2/1/2024 1/23/23   Meme Funes APRN   hydroCHLOROthiazide (HYDRODIURIL) 25 MG tablet TAKE ONE TABLET BY MOUTH EVERY DAY 8/21/23   Onesimo Thayer Jr., MD   loteprednol (LOTEMAX) 0.5 % ophthalmic suspension INSTILL 1 DROP IN EACH EYE FOUR TIMES DAILY 5/18/22   Provider, MD Roe   rosuvastatin (CRESTOR) 5 MG tablet Take 1 tablet by mouth Every Night. 5/19/23   Onesimo Thayer Jr., MD   Xiidra 5 % ophthalmic solution INSTILL 1 DROP IN EACH EYE TWICE  "DAILY  Patient not taking: Reported on 2/1/2024 7/12/22   Provider, MD Roe        Social History:   Social History     Tobacco Use    Smoking status: Never     Passive exposure: Never    Smokeless tobacco: Never   Vaping Use    Vaping Use: Never used   Substance Use Topics    Alcohol use: Never    Drug use: Never         Review of Systems:  Review of Systems   Respiratory:  Positive for shortness of breath.    Cardiovascular:  Positive for chest pain and palpitations. Negative for leg swelling.   Gastrointestinal:  Positive for nausea. Negative for vomiting.        Physical Exam:  /91   Pulse (!) 149   Temp 98.9 °F (37.2 °C) (Oral)   Resp 18   Ht 195.6 cm (77\")   Wt (!) 137 kg (302 lb 0.5 oz)   SpO2 97%   BMI 35.82 kg/m²     Physical Exam  Constitutional:       General: He is not in acute distress.     Appearance: He is well-developed.   HENT:      Head: Normocephalic and atraumatic.   Cardiovascular:      Rate and Rhythm: Tachycardia present. Rhythm irregularly irregular.      Heart sounds: Normal heart sounds.   Pulmonary:      Effort: Pulmonary effort is normal.      Breath sounds: Normal breath sounds.   Abdominal:      Palpations: Abdomen is soft.      Tenderness: There is no abdominal tenderness.   Musculoskeletal:      Right lower leg: No edema.      Left lower leg: No edema.   Skin:     General: Skin is warm and dry.   Neurological:      General: No focal deficit present.      Mental Status: He is alert.   Psychiatric:         Mood and Affect: Mood normal.         Behavior: Behavior normal.                  Procedures:  Procedures      Medical Decision Making:      Comorbidities that affect care:    Atrial Fibrillation, Hypertension    External Notes reviewed:    Reviewed cardiology note from 1/17/2024      The following orders were placed and all results were independently analyzed by me:  Orders Placed This Encounter   Procedures    XR Chest 1 View    [place] Draw    High Sensitivity " Troponin T    Comprehensive Metabolic Panel    Lipase    BNP    Magnesium    CBC Auto Differential    High Sensitivity Troponin T 2Hr    NPO Diet NPO Type: Strict NPO    Undress & Gown    Continuous Pulse Oximetry    Inpatient Cardiology Consult    Inpatient Hospitalist Consult    Oxygen Therapy- Nasal Cannula; Titrate 1-6 LPM Per SpO2; 90 - 95%    ECG 12 Lead ED Triage Standing Order; Chest Pain    ECG 12 Lead ED Triage Standing Order; Chest Pain    ECG 12 Lead Rhythm Change    Insert Peripheral IV    CBC & Differential    Green Top (Gel)    Lavender Top    Gold Top - SST    Light Blue Top       Medications Given in the Emergency Department:  Medications   sodium chloride 0.9 % flush 10 mL (has no administration in time range)   aspirin chewable tablet 324 mg (324 mg Oral Not Given 2/29/24 1313)   dilTIAZem (CARDIZEM) 125 mg in 125 mL sodium chloride  infusion (5 mg/hr Intravenous New Bag 2/29/24 1316)   dilTIAZem (CARDIZEM) bolus from bag 1 mg/mL solution 10 mg (10 mg Intravenous Bolus from Bag 2/29/24 1315)        ED Course:    ED Course as of 02/29/24 1400   Thu Feb 29, 2024   1237 EKG interpreted by me  Time: 1115  Heart rate 96  Sinus, multiple PVCs and PACs, nonspecific ST changes [MA]   1237 Repeat EKG at 1215 shows an atrial flutter [MA]   1342 Spoke with  Dr. Swenson who recommends admission and continuing Cardizem drip [MA]   1359 Spoke with Dr. Fall who agrees to admit.  [MA]      ED Course User Index  [MA] Jose Carlos Salazar MD       Labs:    Lab Results (last 24 hours)       Procedure Component Value Units Date/Time    High Sensitivity Troponin T [[id number]]  (Normal) Collected: 02/29/24 1120    Specimen: Blood from Arm, Left Updated: 02/29/24 1156     HS Troponin T 8 ng/L     Narrative:      High Sensitive Troponin T Reference Range:  <14.0 ng/L- Negative Female for AMI  <22.0 ng/L- Negative Male for AMI  >=14 - Abnormal Female indicating possible myocardial injury.  >=22 - Abnormal Male  indicating possible myocardial injury.   Clinicians would have to utilize clinical acumen, EKG, Troponin, and serial changes to determine if it is an Acute Myocardial Infarction or myocardial injury due to an underlying chronic condition.         CBC & Differential [[id number]]  (Abnormal) Collected: 02/29/24 1120    Specimen: Blood from Arm, Left Updated: 02/29/24 1129    Narrative:      The following orders were created for panel order CBC & Differential.  Procedure                               Abnormality         Status                     ---------                               -----------         ------                     CBC Auto Differential[[id number]]        Abnormal            Final result                 Please view results for these tests on the individual orders.    Comprehensive Metabolic Panel [[id number]]  (Abnormal) Collected: 02/29/24 1120    Specimen: Blood from Arm, Left Updated: 02/29/24 1156     Glucose 110 mg/dL      BUN 8 mg/dL      Creatinine 0.91 mg/dL      Sodium 140 mmol/L      Potassium 3.7 mmol/L      Chloride 100 mmol/L      CO2 29.1 mmol/L      Calcium 9.7 mg/dL      Total Protein 7.4 g/dL      Albumin 4.4 g/dL      ALT (SGPT) 19 U/L      AST (SGOT) 17 U/L      Alkaline Phosphatase 74 U/L      Total Bilirubin 1.2 mg/dL      Globulin 3.0 gm/dL      A/G Ratio 1.5 g/dL      BUN/Creatinine Ratio 8.8     Anion Gap 10.9 mmol/L      eGFR 112.0 mL/min/1.73     Narrative:      GFR Normal >60  Chronic Kidney Disease <60  Kidney Failure <15      Lipase [[id number]]  (Normal) Collected: 02/29/24 1120    Specimen: Blood from Arm, Left Updated: 02/29/24 1156     Lipase 31 U/L     BNP [[id number]]  (Normal) Collected: 02/29/24 1120    Specimen: Blood from Arm, Left Updated: 02/29/24 1151     proBNP 201.7 pg/mL     Narrative:      This assay is used as an aid in the diagnosis of individuals suspected of having heart failure. It can be used as an aid in the diagnosis of acute decompensated heart failure  (ADHF) in patients presenting with signs and symptoms of ADHF to the emergency department (ED). In addition, NT-proBNP of <300 pg/mL indicates ADHF is not likely.    Age Range Result Interpretation  NT-proBNP Concentration (pg/mL:      <50             Positive            >450                   Gray                 300-450                    Negative             <300    50-75           Positive            >900                  Gray                300-900                  Negative            <300      >75             Positive            >1800                  Gray                300-1800                  Negative            <300    Magnesium [[id number]]  (Normal) Collected: 02/29/24 1120    Specimen: Blood from Arm, Left Updated: 02/29/24 1156     Magnesium 1.9 mg/dL     CBC Auto Differential [[id number]]  (Abnormal) Collected: 02/29/24 1120    Specimen: Blood from Arm, Left Updated: 02/29/24 1129     WBC 6.52 10*3/mm3      RBC 5.48 10*6/mm3      Hemoglobin 16.7 g/dL      Hematocrit 48.2 %      MCV 88.0 fL      MCH 30.5 pg      MCHC 34.6 g/dL      RDW 12.2 %      RDW-SD 39.5 fl      MPV 11.1 fL      Platelets 223 10*3/mm3      Neutrophil % 60.5 %      Lymphocyte % 31.3 %      Monocyte % 6.6 %      Eosinophil % 0.8 %      Basophil % 0.6 %      Immature Grans % 0.2 %      Neutrophils, Absolute 3.95 10*3/mm3      Lymphocytes, Absolute 2.04 10*3/mm3      Monocytes, Absolute 0.43 10*3/mm3      Eosinophils, Absolute 0.05 10*3/mm3      Basophils, Absolute 0.04 10*3/mm3      Immature Grans, Absolute 0.01 10*3/mm3      nRBC 0.0 /100 WBC     High Sensitivity Troponin T 2Hr [[id number]] Collected: 02/29/24 1353    Specimen: Blood Updated: 02/29/24 1355             Imaging:    XR Chest 1 View    Result Date: 2/29/2024  PROCEDURE: XR CHEST 1 VW  COMPARISON: [facility], CR, XR CHEST PA AND LATERAL, 11/09/2022, 16:44.  INDICATIONS: CHEST PAIN TODAY  FINDINGS:  The cardiomediastinal silhouette is within normal  limits. The lungs are clear. There is no focal consolidation, pneumothorax or large pleural effusion.        NO ACUTE PROCESS.       RON DIANE MD       Electronically Signed and Approved By: RON DIANE MD on 2/29/2024 at 11:43                Differential Diagnosis and Discussion:    Palpitations: Differential diagnosis includes but is not limited to anxiety, atrioventricular blocks, mitral valve disease, hypoxia, coronary artery disease, hypokalemia, anemia, fever, COPD, congestive heart failure, pericarditis, Davidson-Parkinson-White syndrome, pulmonary embolism, SVT, atrial fibrillation, atrial flutter, sinus tachycardia, thyrotoxicosis, and pheochromocytoma.    All labs were reviewed and interpreted by me.  All X-rays impressions were independently interpreted by me.  EKG was interpreted by me.    MDM     Patient is a 38-year-old gentleman with a history of A-fib who presents with complaints of palpitations.  Found to be in a flutter with RVR.  Had to be given Cardizem and a Cardizem drip.  Spoke with cardiology who recommended admission.  Will admit to the hospital further workup management.      Critical Care Note: Total Critical Care time of 35 minutes. Total critical care time documented does not include time spent on separately billed procedures for services of nurses or physician assistants. I personally saw and examined the patient. I have reviewed all diagnostic interpretations and treatment plans as written. I was present for the key portions of any procedures performed and the inclusive time noted in any critical care statement. Critical care time includes patient management by me, time spent at the patients bedside,  time to review lab and imaging results, discussing patient care, documentation in the medical record, and time spent with family or caregiver.        Patient Care Considerations:          Consultants/Shared Management Plan:    Hospitalist: I have discussed the case with Dr. Fall who  agrees to accept the patient for admission.  Consultant: I have discussed the case with Dr. Swenson.  who agrees to consult on the patient.    Social Determinants of Health:          Disposition and Care Coordination:    Admit:   Through independent evaluation of the patient's history, physical, and imperical data, the patient meets criteria for inpatient admission to the hospital.        Final diagnoses:   Atrial flutter with rapid ventricular response        ED Disposition       ED Disposition   Decision to Admit    Condition   --    Comment   --               This medical record created using voice recognition software.             Jose Carlos Salazar MD  02/29/24 1401      Electronically signed by Jose Carlos Salazar MD at 02/29/24 1401       Facility-Administered Medications as of 2/29/2024   Medication Dose Route Frequency Provider Last Rate Last Admin    apixaban (ELIQUIS) tablet 5 mg  5 mg Oral BID Ej Luna MD        dilTIAZem (CARDIZEM) 125 mg in 125 mL sodium chloride  infusion  5-15 mg/hr Intravenous Titrated Jose Carlos Salazar MD 5 mL/hr at 02/29/24 1316 5 mg/hr at 02/29/24 1316    [COMPLETED] dilTIAZem (CARDIZEM) bolus from bag 1 mg/mL solution 10 mg  10 mg Intravenous Once Jose Carlos Salazar MD   10 mg at 02/29/24 1315    dilTIAZem CD (CARDIZEM CD) 24 hr capsule 240 mg  240 mg Oral Q24H Ej Luna MD        flecainide (TAMBOCOR) tablet 100 mg  100 mg Oral Q12H KAVON Swenson MD        prednisoLONE acetate (PRED FORTE) 1 % ophthalmic suspension 2 drop  2 drop Both Eyes Q6H Ej Luna MD        rosuvastatin (CRESTOR) tablet 5 mg  5 mg Oral Nightly Ej Luna MD        sodium chloride 0.9 % flush 10 mL  10 mL Intravenous PRN Jose Carlos Salazar MD                Consult Notes (last 24 hours)        KAVON Swenson MD at 02/29/24 0547        Consult Orders    1. Inpatient Cardiology Consult [[id number]] ordered by Jose Carlos Salazar MD at 02/29/24 6006                  Cardiology Consult Note  [facility] EMERGENCY ROOM          Patient Identification:  Vanec Lorenzo      [id number]  38 y.o.        male  1985       Date of Consultation: 2/29/2024    Reason for Consultation: Atrial fibrillation    PCP: Onesimo Thayer Jr., MD  Primary cardiologist: Dr. Castellano    History of Present Illness:     38-year-old white male.  He has known paroxysmal atrial fibrillation, he has been on a combination of medical therapy to keep this controlled.  He has obstructive sleep apnea, he has not been consistently using his CPAP over the past couple of weeks.  He is hypertensive.  Most recently he was started on flecainide but takes it only every other day.  He had palpitations and breakthrough atrial fibrillation today and came to the emergency room for further evaluation.  His initial EKG showed sinus rhythm with frequent ectopy but then he went back into rapid atrial fibrillation.  He was initially given a single dose of IV Cardizem but now is on a Cardizem drip.  He feels somewhat of a headache.  No bleeding problems on anticoagulation.  He has been compliant with his medical therapy.    Past History:  Past Medical History:   Diagnosis Date    Essential hypertension 12/01/2021    Hyperlipidemia LDL goal <100 12/01/2021    Paroxysmal atrial fibrillation 12/01/2021    Tinnitus     Xerosis of skin 08/17/2018     History reviewed. No pertinent surgical history.  Allergies   Allergen Reactions    Metoprolol Tinnitus     Social History     Socioeconomic History    Marital status: Single   Tobacco Use    Smoking status: Never     Passive exposure: Never    Smokeless tobacco: Never   Vaping Use    Vaping Use: Never used   Substance and Sexual Activity    Alcohol use: Never    Drug use: Never    Sexual activity: Defer     Family History   Problem Relation Age of Onset    Breast cancer Maternal Grandmother         50s    Cancer Paternal Grandmother     Cancer Paternal  "Grandfather     Heart failure Paternal Grandfather      Medications:  (Not in a hospital admission)    Current medications:  apixaban, 5 mg, Oral, BID  dilTIAZem CD, 240 mg, Oral, Q24H  [START ON 3/1/2024] flecainide, 100 mg, Oral, Every Other Day  prednisoLONE acetate, 2 drop, Both Eyes, Q6H  rosuvastatin, 5 mg, Oral, Nightly      Current IV drips:  dilTIAZem, 5-15 mg/hr, Last Rate: 5 mg/hr (02/29/24 1316)        Review of Systems   Constitutional: Positive for malaise/fatigue.   Eyes: Negative.    Cardiovascular:  Positive for dyspnea on exertion, irregular heartbeat and palpitations. Negative for chest pain.   Respiratory:  Positive for shortness of breath.    Endocrine: Negative.    Hematologic/Lymphatic: Negative.    Skin: Negative.    Musculoskeletal: Negative.    Gastrointestinal: Negative.    Genitourinary: Negative.    Neurological:  Positive for headaches.   Psychiatric/Behavioral: Negative.           Physical exam:    /93   Pulse 100   Temp 98.9 °F (37.2 °C) (Oral)   Resp 18   Ht 195.6 cm (77\")   Wt (!) 137 kg (302 lb 0.5 oz)   SpO2 94%   BMI 35.82 kg/m²  Body mass index is 35.82 kg/m².    SpO2  Min: 94 %  Max: 98 %    General Appearance:   well developed  well nourished  HENT:   oropharynx moist  lips not cyanotic  Neck:  thyroid not enlarged  supple  Respiratory:  no respiratory distress  normal breath sounds  no rales  Cardiovascular:  no jugular venous distention  Irregularly irregular rhythm  apical impulse normal  S1 normal, S2 normal  no S3, no S4   no murmur  no rub, no thrill  carotid pulses normal; no bruit  pedal pulses normal  lower extremity edema: none    Gastrointestinal:   bowel sounds normal  non-tender  no hepatomegaly, no splenomegaly  Musculoskeletal:  no clubbing of fingers.   normocephalic, head atraumatic  Skin:   warm, dry  Neuro/Psychiatric:  judgement and insight appropriate  normal mood and affect    Cardiographics:     ECG  (personally reviewed) atrial " "fibrillation, rate 98, QTc normal   Telemetry:  (personally reviewed) atrial fibrillation   ECHO: Reviewed previously   CATH:     CARDIOLITE:      Lab Review:       Results from last 7 days   Lab Units 02/29/24  1120   WBC 10*3/mm3 6.52   HEMOGLOBIN g/dL 16.7   HEMATOCRIT % 48.2            Results from last 7 days   Lab Units 02/29/24  1120   SODIUM mmol/L 140   BUN mg/dL 8   CREATININE mg/dL 0.91   GLUCOSE mg/dL 110*      Estimated Creatinine Clearance: 171.4 mL/min (by C-G formula based on SCr of 0.91 mg/dL).         Invalid input(s): \"LDLCALC\"          Lab Results   Component Value Date    TSH 1.380 05/08/2023        Lab Results   Component Value Date    HGBA1C 5.30 05/08/2023           No results found for: \"DIGOXIN\"   No components found for: \"DDIMERQUAN\"     Imaging:   XR Chest 1 View    Result Date: 2/29/2024  PROCEDURE: XR CHEST 1 VW  COMPARISON: [facility], CR, XR CHEST PA AND LATERAL, 11/09/2022, 16:44.  INDICATIONS: CHEST PAIN TODAY  FINDINGS:  The cardiomediastinal silhouette is within normal limits. The lungs are clear. There is no focal consolidation, pneumothorax or large pleural effusion.       Impression:  NO ACUTE PROCESS.       RON DIANE MD       Electronically Signed and Approved By: RON DIANE MD on 2/29/2024 at 11:43                The ASCVD Risk score (Saeid TRAYLOR, et al., 2019) failed to calculate for the following reasons:    The 2019 ASCVD risk score is only valid for ages 40 to 79      Assessment:      Atrial fibrillation with RVR      Initial cardiac assessment: 38-year-old white male with known paroxysmal atrial fibrillation presents with symptomatic breakthrough A-fib.  His rates were rapid and he is now on a Cardizem drip.  His home medication regimen shows that he is on a combination of oral Cardizem, and flecainide but he is only taking his flecainide every other day so it is likely not fully effective.  He is on chronic Eliquis " therapy      Recommendations:  1.  Continue rate control, will give flecainide tonight and in the morning along with his normal Eliquis dosing.  He may need to be cardioverted if he does not convert back to sinus rhythm by tomorrow for the short-term management.  Otherwise long-term this patient needs to see electrophysiology and have a catheter ablation done.  2.  We will tentatively schedule elective cardioversion tomorrow depending on how his rhythm goes.  Will dose the flecainide consistently and not skip days.  3.  In addition his noncompliance with CPAP therapy would need to be addressed as this may be playing a role in his breakthrough atrial fibrillation.  His machine apparently has something broken that is needing serviced.  4.  Other recommendations will depend on his hospital course                yRan Swenson MD  2/29/2024    14:58 EST    Electronically signed by KAVON Swenson MD at 02/29/24 [id number]

## 2024-02-29 NOTE — ED PROVIDER NOTES
Time: 12:30 PM EST  Date of encounter:  2/29/2024  Independent Historian/Clinical History and Information was obtained by:   Patient    History is limited by: N/A    Chief Complaint: Palpitations      History of Present Illness:  Patient is a 38 y.o. year old male with h/o Afib who presents to the emergency department for evaluation of palpitations that started between 9&10 pm last night. Pt also states he had some mild shortness of breath, nausea, and a brief sharp pain in his chest that went into his left arm. Pt is on diltiazem once daily, eliquis bid, and flecainide every other day for his Afib. He does state he is supposed to avoid caffeine, but did drink a couple sodas in the last few days. Pt sees Dr. Castellano, he called their office this morning and they recommended ER evaluation.     HPI    Patient Care Team  Primary Care Provider: Onseimo Thayer Jr., MD    Past Medical History:     Allergies   Allergen Reactions    Metoprolol Tinnitus     Past Medical History:   Diagnosis Date    Essential hypertension 12/01/2021    Hyperlipidemia LDL goal <100 12/01/2021    Paroxysmal atrial fibrillation 12/01/2021    Tinnitus     Xerosis of skin 08/17/2018     History reviewed. No pertinent surgical history.  Family History   Problem Relation Age of Onset    Breast cancer Maternal Grandmother         50s    Cancer Paternal Grandmother     Cancer Paternal Grandfather     Heart failure Paternal Grandfather        Home Medications:  Prior to Admission medications    Medication Sig Start Date End Date Taking? Authorizing Provider   dilTIAZem XR (DILACOR XR) 240 MG 24 hr capsule TAKE ONE CAPSULE BY MOUTH DAILY 7/19/23   Onesimo Thayer Jr., MD   Eliquis 5 MG tablet tablet TAKE ONE TABLET BY MOUTH TWICE DAILY 9/12/23   Damien Castellano MD   flecainide (TAMBOCOR) 100 MG tablet Take 1 tablet by mouth Every Other Day. 1/17/24   Samia Veliz APRN   fluticasone (FLONASE) 50 MCG/ACT nasal spray 2 sprays into the  "nostril(s) as directed by provider Daily.  Patient not taking: Reported on 2/1/2024 1/23/23   Meme Funes APRFLAVIA   hydroCHLOROthiazide (HYDRODIURIL) 25 MG tablet TAKE ONE TABLET BY MOUTH EVERY DAY 8/21/23   Onesimo Thayer Jr., MD   loteprednol (LOTEMAX) 0.5 % ophthalmic suspension INSTILL 1 DROP IN EACH EYE FOUR TIMES DAILY 5/18/22   Roe Francois MD   rosuvastatin (CRESTOR) 5 MG tablet Take 1 tablet by mouth Every Night. 5/19/23   Onesimo Thayer Jr., MD   Xiidra 5 % ophthalmic solution INSTILL 1 DROP IN EACH EYE TWICE DAILY  Patient not taking: Reported on 2/1/2024 7/12/22   ProviderRoe MD        Social History:   Social History     Tobacco Use    Smoking status: Never     Passive exposure: Never    Smokeless tobacco: Never   Vaping Use    Vaping Use: Never used   Substance Use Topics    Alcohol use: Never    Drug use: Never         Review of Systems:  Review of Systems   Respiratory:  Positive for shortness of breath.    Cardiovascular:  Positive for chest pain and palpitations. Negative for leg swelling.   Gastrointestinal:  Positive for nausea. Negative for vomiting.        Physical Exam:  /91   Pulse (!) 149   Temp 98.9 °F (37.2 °C) (Oral)   Resp 18   Ht 195.6 cm (77\")   Wt (!) 137 kg (302 lb 0.5 oz)   SpO2 97%   BMI 35.82 kg/m²     Physical Exam  Constitutional:       General: He is not in acute distress.     Appearance: He is well-developed.   HENT:      Head: Normocephalic and atraumatic.   Cardiovascular:      Rate and Rhythm: Tachycardia present. Rhythm irregularly irregular.      Heart sounds: Normal heart sounds.   Pulmonary:      Effort: Pulmonary effort is normal.      Breath sounds: Normal breath sounds.   Abdominal:      Palpations: Abdomen is soft.      Tenderness: There is no abdominal tenderness.   Musculoskeletal:      Right lower leg: No edema.      Left lower leg: No edema.   Skin:     General: Skin is warm and dry.   Neurological:      " General: No focal deficit present.      Mental Status: He is alert.   Psychiatric:         Mood and Affect: Mood normal.         Behavior: Behavior normal.                  Procedures:  Procedures      Medical Decision Making:      Comorbidities that affect care:    Atrial Fibrillation, Hypertension    External Notes reviewed:    Reviewed cardiology note from 1/17/2024      The following orders were placed and all results were independently analyzed by me:  Orders Placed This Encounter   Procedures    XR Chest 1 View    Adrian Draw    High Sensitivity Troponin T    Comprehensive Metabolic Panel    Lipase    BNP    Magnesium    CBC Auto Differential    High Sensitivity Troponin T 2Hr    NPO Diet NPO Type: Strict NPO    Undress & Gown    Continuous Pulse Oximetry    Inpatient Cardiology Consult    Inpatient Hospitalist Consult    Oxygen Therapy- Nasal Cannula; Titrate 1-6 LPM Per SpO2; 90 - 95%    ECG 12 Lead ED Triage Standing Order; Chest Pain    ECG 12 Lead ED Triage Standing Order; Chest Pain    ECG 12 Lead Rhythm Change    Insert Peripheral IV    CBC & Differential    Green Top (Gel)    Lavender Top    Gold Top - SST    Light Blue Top       Medications Given in the Emergency Department:  Medications   sodium chloride 0.9 % flush 10 mL (has no administration in time range)   aspirin chewable tablet 324 mg (324 mg Oral Not Given 2/29/24 1313)   dilTIAZem (CARDIZEM) 125 mg in 125 mL sodium chloride  infusion (5 mg/hr Intravenous New Bag 2/29/24 1316)   dilTIAZem (CARDIZEM) bolus from bag 1 mg/mL solution 10 mg (10 mg Intravenous Bolus from Bag 2/29/24 1315)        ED Course:    ED Course as of 02/29/24 1400   Thu Feb 29, 2024   1237 EKG interpreted by me  Time: 1115  Heart rate 96  Sinus, multiple PVCs and PACs, nonspecific ST changes [MA]   1237 Repeat EKG at 1215 shows an atrial flutter [MA]   1342 Spoke with  Dr. Swenson who recommends admission and continuing Cardizem drip [MA]   1359 Spoke with Dr. Fall  who agrees to admit.  [MA]      ED Course User Index  [MA] Jose Carlos Salazar MD       Labs:    Lab Results (last 24 hours)       Procedure Component Value Units Date/Time    High Sensitivity Troponin T [[id number]]  (Normal) Collected: 02/29/24 1120    Specimen: Blood from Arm, Left Updated: 02/29/24 1156     HS Troponin T 8 ng/L     Narrative:      High Sensitive Troponin T Reference Range:  <14.0 ng/L- Negative Female for AMI  <22.0 ng/L- Negative Male for AMI  >=14 - Abnormal Female indicating possible myocardial injury.  >=22 - Abnormal Male indicating possible myocardial injury.   Clinicians would have to utilize clinical acumen, EKG, Troponin, and serial changes to determine if it is an Acute Myocardial Infarction or myocardial injury due to an underlying chronic condition.         CBC & Differential [[id number]]  (Abnormal) Collected: 02/29/24 1120    Specimen: Blood from Arm, Left Updated: 02/29/24 1129    Narrative:      The following orders were created for panel order CBC & Differential.  Procedure                               Abnormality         Status                     ---------                               -----------         ------                     CBC Auto Differential[[id number]]        Abnormal            Final result                 Please view results for these tests on the individual orders.    Comprehensive Metabolic Panel [[id number]]  (Abnormal) Collected: 02/29/24 1120    Specimen: Blood from Arm, Left Updated: 02/29/24 1156     Glucose 110 mg/dL      BUN 8 mg/dL      Creatinine 0.91 mg/dL      Sodium 140 mmol/L      Potassium 3.7 mmol/L      Chloride 100 mmol/L      CO2 29.1 mmol/L      Calcium 9.7 mg/dL      Total Protein 7.4 g/dL      Albumin 4.4 g/dL      ALT (SGPT) 19 U/L      AST (SGOT) 17 U/L      Alkaline Phosphatase 74 U/L      Total Bilirubin 1.2 mg/dL      Globulin 3.0 gm/dL      A/G Ratio 1.5 g/dL      BUN/Creatinine Ratio 8.8     Anion Gap 10.9 mmol/L      eGFR 112.0  mL/min/1.73     Narrative:      GFR Normal >60  Chronic Kidney Disease <60  Kidney Failure <15      Lipase [[id number]]  (Normal) Collected: 02/29/24 1120    Specimen: Blood from Arm, Left Updated: 02/29/24 1156     Lipase 31 U/L     BNP [[id number]]  (Normal) Collected: 02/29/24 1120    Specimen: Blood from Arm, Left Updated: 02/29/24 1151     proBNP 201.7 pg/mL     Narrative:      This assay is used as an aid in the diagnosis of individuals suspected of having heart failure. It can be used as an aid in the diagnosis of acute decompensated heart failure (ADHF) in patients presenting with signs and symptoms of ADHF to the emergency department (ED). In addition, NT-proBNP of <300 pg/mL indicates ADHF is not likely.    Age Range Result Interpretation  NT-proBNP Concentration (pg/mL:      <50             Positive            >450                   Gray                 300-450                    Negative             <300    50-75           Positive            >900                  Gray                300-900                  Negative            <300      >75             Positive            >1800                  Gray                300-1800                  Negative            <300    Magnesium [[id number]]  (Normal) Collected: 02/29/24 1120    Specimen: Blood from Arm, Left Updated: 02/29/24 1156     Magnesium 1.9 mg/dL     CBC Auto Differential [[id number]]  (Abnormal) Collected: 02/29/24 1120    Specimen: Blood from Arm, Left Updated: 02/29/24 1129     WBC 6.52 10*3/mm3      RBC 5.48 10*6/mm3      Hemoglobin 16.7 g/dL      Hematocrit 48.2 %      MCV 88.0 fL      MCH 30.5 pg      MCHC 34.6 g/dL      RDW 12.2 %      RDW-SD 39.5 fl      MPV 11.1 fL      Platelets 223 10*3/mm3      Neutrophil % 60.5 %      Lymphocyte % 31.3 %      Monocyte % 6.6 %      Eosinophil % 0.8 %      Basophil % 0.6 %      Immature Grans % 0.2 %      Neutrophils, Absolute 3.95 10*3/mm3      Lymphocytes, Absolute 2.04 10*3/mm3      Monocytes, Absolute  0.43 10*3/mm3      Eosinophils, Absolute 0.05 10*3/mm3      Basophils, Absolute 0.04 10*3/mm3      Immature Grans, Absolute 0.01 10*3/mm3      nRBC 0.0 /100 WBC     High Sensitivity Troponin T 2Hr [[id number]] Collected: 02/29/24 1353    Specimen: Blood Updated: 02/29/24 1355             Imaging:    XR Chest 1 View    Result Date: 2/29/2024  PROCEDURE: XR CHEST 1 VW  COMPARISON: [facility], CR, XR CHEST PA AND LATERAL, 11/09/2022, 16:44.  INDICATIONS: CHEST PAIN TODAY  FINDINGS:  The cardiomediastinal silhouette is within normal limits. The lungs are clear. There is no focal consolidation, pneumothorax or large pleural effusion.        NO ACUTE PROCESS.       RON DIANE MD       Electronically Signed and Approved By: RON DIANE MD on 2/29/2024 at 11:43                Differential Diagnosis and Discussion:    Palpitations: Differential diagnosis includes but is not limited to anxiety, atrioventricular blocks, mitral valve disease, hypoxia, coronary artery disease, hypokalemia, anemia, fever, COPD, congestive heart failure, pericarditis, Davidson-Parkinson-White syndrome, pulmonary embolism, SVT, atrial fibrillation, atrial flutter, sinus tachycardia, thyrotoxicosis, and pheochromocytoma.    All labs were reviewed and interpreted by me.  All X-rays impressions were independently interpreted by me.  EKG was interpreted by me.    MDM     Patient is a 38-year-old gentleman with a history of A-fib who presents with complaints of palpitations.  Found to be in a flutter with RVR.  Had to be given Cardizem and a Cardizem drip.  Spoke with cardiology who recommended admission.  Will admit to the hospital further workup management.      Critical Care Note: Total Critical Care time of 35 minutes. Total critical care time documented does not include time spent on separately billed procedures for services of nurses or physician assistants. I personally saw and examined the patient. I have reviewed all  diagnostic interpretations and treatment plans as written. I was present for the key portions of any procedures performed and the inclusive time noted in any critical care statement. Critical care time includes patient management by me, time spent at the patients bedside,  time to review lab and imaging results, discussing patient care, documentation in the medical record, and time spent with family or caregiver.        Patient Care Considerations:          Consultants/Shared Management Plan:    Hospitalist: I have discussed the case with Dr. Fall who agrees to accept the patient for admission.  Consultant: I have discussed the case with Dr. Swenson.  who agrees to consult on the patient.    Social Determinants of Health:          Disposition and Care Coordination:    Admit:   Through independent evaluation of the patient's history, physical, and imperical data, the patient meets criteria for inpatient admission to the hospital.        Final diagnoses:   Atrial flutter with rapid ventricular response        ED Disposition       ED Disposition   Decision to Admit    Condition   --    Comment   --               This medical record created using voice recognition software.             Jose Carlos Salazar MD  02/29/24 [id number]

## 2024-02-29 NOTE — PLAN OF CARE
Goal Outcome Evaluation:  Plan of Care Reviewed With: patient, mother  Pt is on Cardizem gtt at 5. SR with PVC's on the cardiac monitor. Denies pain or discomfort.

## 2024-02-29 NOTE — H&P
[facility]  Date: 2024   Patient Name: Vance Lorenzo  : 1985  MRN: [id number]  Primary Care Physician:  Onesimo Thayer Jr., MD  Date of admission: 2024    Subjective   Subjective     Chief Complaint: Palpitations, shortness of breath    HPI:    Vance Lorenzo is a 38 y.o. male PMH HTN, HLD, and breath and palpitations.  Patient states his symptoms actually started last night around 7 PM where he felt his heart was racing and was irregular.  He states it improved and seem to calm down around 1230 or 1 and he went to sleep.  This morning, he still felt intermittent palpitations.  He called his primary cardiologist office to recommended he go to the ED.  In the ED he was found to be in A-fib with RVR with rates up to the 160s.  He was given a push of diltiazem, started on diltiazem drip and admission was requested.  Currently, his heart rate is in the high 90s and he states his palpitations feel somewhat better.  He denies any recent illness, fever, chills, URI symptoms.    Personal History     Past Medical History:  Past Medical History:   Diagnosis Date    Essential hypertension 2021    Hyperlipidemia LDL goal <100 2021    Paroxysmal atrial fibrillation 2021    Tinnitus     Xerosis of skin 2018       Past Surgical History:  History reviewed. No pertinent surgical history.    Family History:   Family History   Problem Relation Age of Onset    Breast cancer Maternal Grandmother         50s    Cancer Paternal Grandmother     Cancer Paternal Grandfather     Heart failure Paternal Grandfather         Social History:   Social History     Socioeconomic History    Marital status: Single   Tobacco Use    Smoking status: Never     Passive exposure: Never    Smokeless tobacco: Never   Vaping Use    Vaping Use: Never used   Substance and Sexual Activity    Alcohol use: Never    Drug use: Never    Sexual activity: Defer         Home Medications:  Lifitegrast, apixaban, dilTIAZem XR, flecainide, fluticasone, hydroCHLOROthiazide, loteprednol, and rosuvastatin    Allergies:  Allergies   Allergen Reactions    Metoprolol Tinnitus       Review of Systems   A 14 point review of systems was obtained and otherwise negative unless stated in the HPI    Objective   Objective     Vitals:   Temp:  [98.9 °F (37.2 °C)] 98.9 °F (37.2 °C)  Heart Rate:  [] 149  Resp:  [18] 18  BP: (105-151)/() 133/91    Physical Exam    Constitutional: Awake, alert, no acute distress   HENT: NCAT, mucous membranes moist   Respiratory: Clear to auscultation bilaterally, nonlabored respirations    Cardiovascular: Irregularly irregular, tachycardic, no murmurs, rubs, or gallops, palpable pedal pulses bilaterally   Gastrointestinal: Positive bowel sounds, soft, nontender, nondistended   Musculoskeletal: No bilateral ankle edema, no clubbing or cyanosis to extremities   Psychiatric: Appropriate affect, cooperative   Neurologic: Oriented x 3, strength symmetric in all extremities, Cranial Nerves grossly intact to confrontation, speech clear   Skin: No rashes     Result Review    Result Review:  I have personally reviewed the results from the time of this admission to 2/29/2024 14:14 EST and agree with these findings:  [x]  Laboratory personally viewed CMP, CBC, magnesium, troponin  CBC          12/6/2023    15:25 2/1/2024    15:13 2/29/2024    11:20   CBC   WBC 8.31  7.71  6.52    RBC 5.25  5.70  5.48    Hemoglobin 16.4  17.4  16.7    Hematocrit 46.9  49.5  48.2    MCV 89.3  86.8  88.0    MCH 31.2  30.5  30.5    MCHC 35.0  35.2  34.6    RDW 12.2  12.6  12.2    Platelets 227  243  223      CMP          12/6/2023    15:25 2/1/2024    15:13 2/29/2024    11:20   CMP   Glucose 91  87  110    BUN 5  10  8    Creatinine 0.83  0.90  0.91    EGFR 116.3  113.5  112.0    Sodium 142  140  140    Potassium 3.7  3.5  3.7    Chloride 103  101  100    Calcium 9.6  10.1  9.7     Total Protein 7.3  8.0  7.4    Albumin 4.6  4.7  4.4    Globulin 2.7  3.3  3.0    Total Bilirubin 0.9  1.4  1.2    Alkaline Phosphatase 67  77  74    AST (SGOT) 21  21  17    ALT (SGPT) 20  24  19    Albumin/Globulin Ratio 1.7  1.4  1.5    BUN/Creatinine Ratio 6.0  11.1  8.8    Anion Gap 13.2  15.2  10.9      []  Microbiology  []  Radiology  [x]  EKG/Telemetry Telemetry personally reviewed  []  Cardiology/Vascular   []  Pathology  []  Old records  []  Other:      Assessment & Plan   Assessment / Plan     Assessment/Plan:   Paroxysmal atrial fibrillation with RVR  Hypertension  Hyperlipidemia  NANETTE compliant with CPAP    Admit to the hospital on telemetry for workup and management of the above  Consult cardiology-appreciate assistance  Restart home oral diltiazem and flecainide  Continue diltiazem drip, wean as tolerated.  Monitor electrolytes vital signs closely while on high risk drip  Restart Eliquis 5 mg twice daily  Restart home simvastatin  Ordered CPAP nightly with naps  Trend renal function and electrolytes with a.m. BMP, magnesium   Trend Hgb and WBC with a.m. CBC    Discussed case with: ED physician, bedside RN, cardiology    DVT prophylaxis:  Medical DVT prophylaxis orders are present.      CODE STATUS:    Level Of Support Discussed With: Patient  Code Status (Patient has no pulse and is not breathing): CPR (Attempt to Resuscitate)  Medical Interventions (Patient has pulse or is breathing): Full Support      Electronically signed by Ej Chapin MD, 02/29/24, 2:14 PM EST.

## 2024-02-29 NOTE — CONSULTS
Cardiology Consult Note  [facility] EMERGENCY ROOM          Patient Identification:  Vance Lorenzo      [id number]  38 y.o.        male  1985       Date of Consultation: 2/29/2024    Reason for Consultation: Atrial fibrillation    PCP: Onesimo Thayer Jr., MD  Primary cardiologist: Dr. Castellano    History of Present Illness:     38-year-old white male.  He has known paroxysmal atrial fibrillation, he has been on a combination of medical therapy to keep this controlled.  He has obstructive sleep apnea, he has not been consistently using his CPAP over the past couple of weeks.  He is hypertensive.  Most recently he was started on flecainide but takes it only every other day.  He had palpitations and breakthrough atrial fibrillation today and came to the emergency room for further evaluation.  His initial EKG showed sinus rhythm with frequent ectopy but then he went back into rapid atrial fibrillation.  He was initially given a single dose of IV Cardizem but now is on a Cardizem drip.  He feels somewhat of a headache.  No bleeding problems on anticoagulation.  He has been compliant with his medical therapy.    Past History:  Past Medical History:   Diagnosis Date    Essential hypertension 12/01/2021    Hyperlipidemia LDL goal <100 12/01/2021    Paroxysmal atrial fibrillation 12/01/2021    Tinnitus     Xerosis of skin 08/17/2018     History reviewed. No pertinent surgical history.  Allergies   Allergen Reactions    Metoprolol Tinnitus     Social History     Socioeconomic History    Marital status: Single   Tobacco Use    Smoking status: Never     Passive exposure: Never    Smokeless tobacco: Never   Vaping Use    Vaping Use: Never used   Substance and Sexual Activity    Alcohol use: Never    Drug use: Never    Sexual activity: Defer     Family History   Problem Relation Age of Onset    Breast cancer Maternal Grandmother         50s    Cancer Paternal Grandmother     Cancer Paternal Grandfather   "   Heart failure Paternal Grandfather      Medications:  (Not in a hospital admission)    Current medications:  apixaban, 5 mg, Oral, BID  dilTIAZem CD, 240 mg, Oral, Q24H  [START ON 3/1/2024] flecainide, 100 mg, Oral, Every Other Day  prednisoLONE acetate, 2 drop, Both Eyes, Q6H  rosuvastatin, 5 mg, Oral, Nightly      Current IV drips:  dilTIAZem, 5-15 mg/hr, Last Rate: 5 mg/hr (02/29/24 1316)        Review of Systems   Constitutional: Positive for malaise/fatigue.   Eyes: Negative.    Cardiovascular:  Positive for dyspnea on exertion, irregular heartbeat and palpitations. Negative for chest pain.   Respiratory:  Positive for shortness of breath.    Endocrine: Negative.    Hematologic/Lymphatic: Negative.    Skin: Negative.    Musculoskeletal: Negative.    Gastrointestinal: Negative.    Genitourinary: Negative.    Neurological:  Positive for headaches.   Psychiatric/Behavioral: Negative.           Physical exam:    /93   Pulse 100   Temp 98.9 °F (37.2 °C) (Oral)   Resp 18   Ht 195.6 cm (77\")   Wt (!) 137 kg (302 lb 0.5 oz)   SpO2 94%   BMI 35.82 kg/m²  Body mass index is 35.82 kg/m².    SpO2  Min: 94 %  Max: 98 %    General Appearance:   well developed  well nourished  HENT:   oropharynx moist  lips not cyanotic  Neck:  thyroid not enlarged  supple  Respiratory:  no respiratory distress  normal breath sounds  no rales  Cardiovascular:  no jugular venous distention  Irregularly irregular rhythm  apical impulse normal  S1 normal, S2 normal  no S3, no S4   no murmur  no rub, no thrill  carotid pulses normal; no bruit  pedal pulses normal  lower extremity edema: none    Gastrointestinal:   bowel sounds normal  non-tender  no hepatomegaly, no splenomegaly  Musculoskeletal:  no clubbing of fingers.   normocephalic, head atraumatic  Skin:   warm, dry  Neuro/Psychiatric:  judgement and insight appropriate  normal mood and affect    Cardiographics:     ECG  (personally reviewed) atrial fibrillation, rate 98, " "QTc normal   Telemetry:  (personally reviewed) atrial fibrillation   ECHO: Reviewed previously   CATH:     CARDIOLITE:      Lab Review:       Results from last 7 days   Lab Units 02/29/24  1120   WBC 10*3/mm3 6.52   HEMOGLOBIN g/dL 16.7   HEMATOCRIT % 48.2            Results from last 7 days   Lab Units 02/29/24  1120   SODIUM mmol/L 140   BUN mg/dL 8   CREATININE mg/dL 0.91   GLUCOSE mg/dL 110*      Estimated Creatinine Clearance: 171.4 mL/min (by C-G formula based on SCr of 0.91 mg/dL).         Invalid input(s): \"LDLCALC\"          Lab Results   Component Value Date    TSH 1.380 05/08/2023        Lab Results   Component Value Date    HGBA1C 5.30 05/08/2023           No results found for: \"DIGOXIN\"   No components found for: \"DDIMERQUAN\"     Imaging:   XR Chest 1 View    Result Date: 2/29/2024  PROCEDURE: XR CHEST 1 VW  COMPARISON: [facility], CR, XR CHEST PA AND LATERAL, 11/09/2022, 16:44.  INDICATIONS: CHEST PAIN TODAY  FINDINGS:  The cardiomediastinal silhouette is within normal limits. The lungs are clear. There is no focal consolidation, pneumothorax or large pleural effusion.       Impression:  NO ACUTE PROCESS.       RON DIANE MD       Electronically Signed and Approved By: RON DIANE MD on 2/29/2024 at 11:43                The ASCVD Risk score (Saeid TRAYLOR, et al., 2019) failed to calculate for the following reasons:    The 2019 ASCVD risk score is only valid for ages 40 to 79      Assessment:      Atrial fibrillation with RVR      Initial cardiac assessment: 38-year-old white male with known paroxysmal atrial fibrillation presents with symptomatic breakthrough A-fib.  His rates were rapid and he is now on a Cardizem drip.  His home medication regimen shows that he is on a combination of oral Cardizem, and flecainide but he is only taking his flecainide every other day so it is likely not fully effective.  He is on chronic Eliquis therapy      Recommendations:  1.  Continue rate " control, will give flecainide tonight and in the morning along with his normal Eliquis dosing.  He may need to be cardioverted if he does not convert back to sinus rhythm by tomorrow for the short-term management.  Otherwise long-term this patient needs to see electrophysiology and have a catheter ablation done.  2.  We will tentatively schedule elective cardioversion tomorrow depending on how his rhythm goes.  Will dose the flecainide consistently and not skip days.  3.  In addition his noncompliance with CPAP therapy would need to be addressed as this may be playing a role in his breakthrough atrial fibrillation.  His machine apparently has something broken that is needing serviced.  4.  Other recommendations will depend on his hospital course                Ryan Swenson MD  2/29/2024    14:58 EST

## 2024-03-01 ENCOUNTER — READMISSION MANAGEMENT (OUTPATIENT)
Dept: CALL CENTER | Facility: HOSPITAL | Age: 39
End: 2024-03-01
Payer: COMMERCIAL

## 2024-03-01 VITALS
SYSTOLIC BLOOD PRESSURE: 139 MMHG | RESPIRATION RATE: 18 BRPM | HEART RATE: 92 BPM | DIASTOLIC BLOOD PRESSURE: 99 MMHG | BODY MASS INDEX: 35.4 KG/M2 | OXYGEN SATURATION: 96 % | TEMPERATURE: 98.2 F | HEIGHT: 77 IN | WEIGHT: 299.83 LBS

## 2024-03-01 LAB
ANION GAP SERPL CALCULATED.3IONS-SCNC: 14.1 MMOL/L (ref 5–15)
BASOPHILS # BLD AUTO: 0.04 10*3/MM3 (ref 0–0.2)
BASOPHILS NFR BLD AUTO: 0.4 % (ref 0–1.5)
BUN SERPL-MCNC: 11 MG/DL (ref 6–20)
BUN/CREAT SERPL: 13.4 (ref 7–25)
CALCIUM SPEC-SCNC: 9.5 MG/DL (ref 8.6–10.5)
CHLORIDE SERPL-SCNC: 100 MMOL/L (ref 98–107)
CO2 SERPL-SCNC: 23.9 MMOL/L (ref 22–29)
CREAT SERPL-MCNC: 0.82 MG/DL (ref 0.76–1.27)
DEPRECATED RDW RBC AUTO: 38.1 FL (ref 37–54)
EGFRCR SERPLBLD CKD-EPI 2021: 116.8 ML/MIN/1.73
EOSINOPHIL # BLD AUTO: 0.05 10*3/MM3 (ref 0–0.4)
EOSINOPHIL NFR BLD AUTO: 0.6 % (ref 0.3–6.2)
ERYTHROCYTE [DISTWIDTH] IN BLOOD BY AUTOMATED COUNT: 12 % (ref 12.3–15.4)
GLUCOSE SERPL-MCNC: 105 MG/DL (ref 65–99)
HCT VFR BLD AUTO: 46.5 % (ref 37.5–51)
HGB BLD-MCNC: 16.3 G/DL (ref 13–17.7)
IMM GRANULOCYTES # BLD AUTO: 0.01 10*3/MM3 (ref 0–0.05)
IMM GRANULOCYTES NFR BLD AUTO: 0.1 % (ref 0–0.5)
LYMPHOCYTES # BLD AUTO: 2.53 10*3/MM3 (ref 0.7–3.1)
LYMPHOCYTES NFR BLD AUTO: 28.2 % (ref 19.6–45.3)
MAGNESIUM SERPL-MCNC: 1.9 MG/DL (ref 1.6–2.6)
MCH RBC QN AUTO: 30.4 PG (ref 26.6–33)
MCHC RBC AUTO-ENTMCNC: 35.1 G/DL (ref 31.5–35.7)
MCV RBC AUTO: 86.6 FL (ref 79–97)
MONOCYTES # BLD AUTO: 0.63 10*3/MM3 (ref 0.1–0.9)
MONOCYTES NFR BLD AUTO: 7 % (ref 5–12)
NEUTROPHILS NFR BLD AUTO: 5.71 10*3/MM3 (ref 1.7–7)
NEUTROPHILS NFR BLD AUTO: 63.7 % (ref 42.7–76)
NRBC BLD AUTO-RTO: 0 /100 WBC (ref 0–0.2)
PLATELET # BLD AUTO: 231 10*3/MM3 (ref 140–450)
PMV BLD AUTO: 12.2 FL (ref 6–12)
POTASSIUM SERPL-SCNC: 3.5 MMOL/L (ref 3.5–5.2)
RBC # BLD AUTO: 5.37 10*6/MM3 (ref 4.14–5.8)
SODIUM SERPL-SCNC: 138 MMOL/L (ref 136–145)
WBC NRBC COR # BLD AUTO: 8.97 10*3/MM3 (ref 3.4–10.8)

## 2024-03-01 PROCEDURE — 99232 SBSQ HOSP IP/OBS MODERATE 35: CPT | Performed by: INTERNAL MEDICINE

## 2024-03-01 PROCEDURE — 94799 UNLISTED PULMONARY SVC/PX: CPT

## 2024-03-01 PROCEDURE — 96366 THER/PROPH/DIAG IV INF ADDON: CPT

## 2024-03-01 PROCEDURE — 85025 COMPLETE CBC W/AUTO DIFF WBC: CPT | Performed by: INTERNAL MEDICINE

## 2024-03-01 PROCEDURE — 94761 N-INVAS EAR/PLS OXIMETRY MLT: CPT

## 2024-03-01 PROCEDURE — 99239 HOSP IP/OBS DSCHRG MGMT >30: CPT | Performed by: INTERNAL MEDICINE

## 2024-03-01 PROCEDURE — 80048 BASIC METABOLIC PNL TOTAL CA: CPT | Performed by: INTERNAL MEDICINE

## 2024-03-01 PROCEDURE — 83735 ASSAY OF MAGNESIUM: CPT | Performed by: INTERNAL MEDICINE

## 2024-03-01 RX ORDER — POTASSIUM CHLORIDE 750 MG/1
40 CAPSULE, EXTENDED RELEASE ORAL ONCE
Status: COMPLETED | OUTPATIENT
Start: 2024-03-01 | End: 2024-03-01

## 2024-03-01 RX ORDER — FLECAINIDE ACETATE 100 MG/1
100 TABLET ORAL 2 TIMES DAILY
Qty: 180 TABLET | Refills: 0 | Status: SHIPPED | OUTPATIENT
Start: 2024-03-01 | End: 2024-03-01 | Stop reason: SDUPTHER

## 2024-03-01 RX ORDER — DILTIAZEM HYDROCHLORIDE 360 MG/1
360 CAPSULE, EXTENDED RELEASE ORAL
Qty: 90 CAPSULE | Refills: 1 | Status: SHIPPED | OUTPATIENT
Start: 2024-03-01

## 2024-03-01 RX ORDER — DILTIAZEM HYDROCHLORIDE 180 MG/1
360 CAPSULE, COATED, EXTENDED RELEASE ORAL
Status: DISCONTINUED | OUTPATIENT
Start: 2024-03-01 | End: 2024-03-01 | Stop reason: HOSPADM

## 2024-03-01 RX ORDER — FLECAINIDE ACETATE 100 MG/1
100 TABLET ORAL 2 TIMES DAILY
Qty: 180 TABLET | Refills: 0 | Status: SHIPPED | OUTPATIENT
Start: 2024-03-01

## 2024-03-01 RX ADMIN — POTASSIUM CHLORIDE 40 MEQ: 10 CAPSULE, COATED, EXTENDED RELEASE ORAL at 09:28

## 2024-03-01 RX ADMIN — APIXABAN 5 MG: 5 TABLET, FILM COATED ORAL at 09:28

## 2024-03-01 RX ADMIN — ACETAMINOPHEN 650 MG: 325 TABLET ORAL at 11:26

## 2024-03-01 RX ADMIN — FLECAINIDE ACETATE 100 MG: 50 TABLET ORAL at 09:28

## 2024-03-01 RX ADMIN — PREDNISOLONE ACETATE 2 DROP: 10 SUSPENSION/ DROPS OPHTHALMIC at 11:28

## 2024-03-01 RX ADMIN — Medication 10 ML: at 09:29

## 2024-03-01 RX ADMIN — DILTIAZEM HYDROCHLORIDE 360 MG: 180 CAPSULE, EXTENDED RELEASE ORAL at 09:28

## 2024-03-01 NOTE — PROGRESS NOTES
CARDIOLOGY  INPATIENT PROGRESS NOTE         [address]    3/1/2024      PATIENT IDENTIFICATION:   Name:  Vance Lorezno      MRN:  [id number]     38 y.o.  male             Reason for visit: Paroxysmal atrial fibrillation      SUBJECTIVE:    The patient has been in and out of sinus rhythm overnight.  I saw him about 730 this morning and at shortly after 7 he was in sinus rhythm but back in atrial fibrillation when I examined him.  He tried to use CPAP last night but apparently had an air leak so he did not use it.  OBJECTIVE:  Vitals:    02/29/24 2340 03/01/24 0405 03/01/24 0729 03/01/24 0753   BP:  161/87  167/85   BP Location:  Right arm  Right arm   Patient Position:  Lying  Lying   Pulse: 95 107 110 89   Resp:  18 18 20   Temp:  98.4 °F (36.9 °C)  98.3 °F (36.8 °C)   TempSrc:  Axillary  Oral   SpO2: 96% 95% 92% 94%   Weight:       Height:               Body mass index is 35.55 kg/m².    Intake/Output Summary (Last 24 hours) at 3/1/2024 0807  Last data filed at 2/29/2024 2300  Gross per 24 hour   Intake 178.67 ml   Output --   Net 178.67 ml       Telemetry: Atrial fibrillation with controlled rate, off and on sinus rhythm    Review of Systems   Constitutional:  Positive for fatigue.   Cardiovascular:  Positive for palpitations.         Exam:  General appearance no acute distress    well nourished   HEENT sclerae non-icteric    lips not cyanotic   Respiratory rate and depth normal    normal breath sounds    no rales, no wheeze   Cardiovascular JVP normal    r irregularly irregular rhythm    S1 normal, S2 normal    no S3, no S4    no murmur    lower extremity edema:none   Abdominal bowel sounds normal    abdomen soft, non-tender    no hepatosplenomegaly   Neuro-psych oriented to person, place, time    cooperative     Allergies   Allergen Reactions    Metoprolol Tinnitus     Scheduled meds:  apixaban, 5 mg, Oral, BID  dilTIAZem CD, 240 mg, Oral, Q24H  flecainide, 100 mg, Oral, Q12H  potassium  "chloride, 40 mEq, Oral, Once  prednisoLONE acetate, 2 drop, Both Eyes, Q6H  rosuvastatin, 5 mg, Oral, Nightly  sodium chloride, 10 mL, Intravenous, Q12H      IV meds:                      dilTIAZem, 5-15 mg/hr, Last Rate: 2.5 mg/hr (02/29/24 2300)      Data Review:  Results from last 7 days   Lab Units 03/01/24  0415 02/29/24  1120   SODIUM mmol/L 138 140   BUN mg/dL 11 8   CREATININE mg/dL 0.82 0.91   GLUCOSE mg/dL 105* 110*             Estimated Creatinine Clearance: 190.2 mL/min (by C-G formula based on SCr of 0.82 mg/dL).  Results from last 7 days   Lab Units 03/01/24  0415 02/29/24  1120   WBC 10*3/mm3 8.97 6.52   HEMOGLOBIN g/dL 16.3 16.7         Results from last 7 days   Lab Units 02/29/24  1120   ALT (SGPT) U/L 19   AST (SGOT) U/L 17     No results found for: \"DIGOXIN\"   Lab Results   Component Value Date    TSH 1.380 05/08/2023           Invalid input(s): \"LDLCALC\"            Imaging (last 24 hr):   Imaging Results (Last 24 Hours)       Procedure Component Value Units Date/Time    XR Chest 1 View [[id number]] Collected: 02/29/24 1143     Updated: 02/29/24 1146    Narrative:      PROCEDURE: XR CHEST 1 VW     COMPARISON: [facility], CR, XR CHEST PA AND LATERAL, 11/09/2022, 16:44.     INDICATIONS: CHEST PAIN TODAY     FINDINGS:   The cardiomediastinal silhouette is within normal limits. The lungs are clear. There is no focal   consolidation, pneumothorax or large pleural effusion.          Impression:       NO ACUTE PROCESS.                  RON DIANE MD         Electronically Signed and Approved By: RON DIANE MD on 2/29/2024 at 11:43                               ASSESSMENT:     Atrial fibrillation with RVR        PLAN:  1.  Since he is having off-and-on sinus rhythm there is no need for cardioversion this morning.  The flecainide will be taken twice a day every day instead of how he was taking it at home which was every other day.  Will increase home dose of diltiazem  2.  " Continue anticoagulation  3.  Discontinue Cardizem drip  4.  Okay to discharge today with follow-up in the office and then eventual expedited referral to EP.  Long-term he needs an ablation            Ryan Swenson MD  3/1/2024    08:07 EST

## 2024-03-01 NOTE — DISCHARGE SUMMARY
[facility]         HOSPITALIST  DISCHARGE SUMMARY    Patient Name: Vance Lorenzo  : 1985  MRN: [id number]    Date of Admission: 2024  Date of Discharge: 3/1/2024  Primary Care Physician: Onesimo Thayer Jr., MD    Consults       Date and Time Order Name Status Description    2024  4:09 PM Inpatient Cardiology Consult      2024  1:44 PM Inpatient Hospitalist Consult      2024  1:34 PM Inpatient Cardiology Consult Completed             Active and Resolved Hospital Problems:  Active Hospital Problems    Diagnosis POA   • **Atrial fibrillation with RVR [I48.91] Yes      Resolved Hospital Problems   No resolved problems to display.   Paroxysmal atrial fibrillation with RVR  Hypertension  Hyperlipidemia  NANETTE, difficulty using CPAP due to broken device    Hospital Course     Hospital Course:  Vance Lorenzo is a 38 y.o. male PMH HTN, HLD, and breath and palpitations.  Patient states his symptoms actually started last night around 7 PM where he felt his heart was racing and was irregular.  He states it improved and seem to calm down around 1230 or 1 and he went to sleep.  This morning, he still felt intermittent palpitations.  He called his primary cardiologist office who recommended he go to the ED.  In the ED he was found to be in A-fib with RVR with rates up to the 160s.  He was given a push of diltiazem, started on diltiazem drip and admission was requested.  Of note, he has a CPAP bedside difficulty using it as the mask is broken.  He is try to call insurance and the company but they have been unable to fix the issue.  For that reason, he has not been using his CPAP for the last week.  Cardiology was consulted, his flecainide was increased to twice daily and his diltiazem was increased.  He was weaned off the diltiazem drip.  He went in and out of A-fib with normal sinus rhythm during his hospital stay so cardioversion was deferred.  Her heart rate was  controlled off diltiazem drip and he was discharged home in stable condition on 3/1/2024 with an increase in his medication regimen.  Recommend follow-up with cardiology in 2 weeks, recommend EP evaluation on outpatient basis for consideration of ablation    Day of Discharge     Vital Signs:  Temp:  [98.2 °F (36.8 °C)-98.8 °F (37.1 °C)] 98.2 °F (36.8 °C)  Heart Rate:  [] 92  Resp:  [16-20] 18  BP: (122-167)/() 139/99  Physical Exam:   Gen: NAD, WDWN  ENT: PERRL, EOMI   CV: IR/IR, no MRG  Pulm: CTAB, no w/r/r  GI: Abd soft, NTND, +bs  Neuro: Moving all extremities spontaneously, CN II-XII grossly intact   Psych: A&O*3, normal mood and affect  Skin: No lesions or rashes noted      Discharge Details        Discharge Medications        New Medications        Instructions Start Date   dilTIAZem  MG 24 hr capsule  Commonly known as: CARDIZEM CD  Replaces: dilTIAZem  MG 24 hr capsule   360 mg, Oral, Every 24 Hours Scheduled             Changes to Medications        Instructions Start Date   flecainide 100 MG tablet  Commonly known as: TAMBOCOR  What changed: when to take this   100 mg, Oral, 2 Times Daily             Continue These Medications        Instructions Start Date   Eliquis 5 MG tablet tablet  Generic drug: apixaban   TAKE ONE TABLET BY MOUTH TWICE DAILY      loteprednol 0.5 % ophthalmic suspension  Commonly known as: LOTEMAX   Administer 1 drop to both eyes Daily.      rosuvastatin 5 MG tablet  Commonly known as: CRESTOR   5 mg, Oral, Nightly             Stop These Medications      dilTIAZem  MG 24 hr capsule  Commonly known as: DILACOR XR  Replaced by: dilTIAZem  MG 24 hr capsule     hydroCHLOROthiazide 25 MG tablet              Allergies   Allergen Reactions   • Metoprolol Tinnitus       Discharge Disposition:  Home or Self Care    Diet:  Hospital:  Diet Order   Procedures   • Diet: Cardiac Diets; Healthy Heart (2-3 Na+); Texture: Regular Texture (IDDSI 7); Fluid  Consistency: Thin (IDDSI 0)       Discharge Activity:   Activity Instructions       Activity as Tolerated              CODE STATUS:  Code Status and Medical Interventions:   Ordered at: 02/29/24 1413     Level Of Support Discussed With:    Patient     Code Status (Patient has no pulse and is not breathing):    CPR (Attempt to Resuscitate)     Medical Interventions (Patient has pulse or is breathing):    Full Support         Future Appointments   Date Time Provider Department Center   7/24/2024  2:45 PM Damien Castellano MD [facility]       Additional Instructions for the Follow-ups that You Need to Schedule       Discharge Follow-up with PCP   As directed       Currently Documented PCP:    Onesimo Thayer Jr., MD    PCP Phone Number:    [phone]     Follow Up Details: 3-5 days        Discharge Follow-up with Specified Provider: Cardiology Dr. Castellano; 2 Weeks   As directed      To: Cardiology Dr. Castellano   Follow Up: 2 Weeks                Pertinent  and/or Most Recent Results     PROCEDURES:   None    LAB RESULTS:      Lab 03/01/24  0415 02/29/24  1120   WBC 8.97 6.52   HEMOGLOBIN 16.3 16.7   HEMATOCRIT 46.5 48.2   PLATELETS 231 223   NEUTROS ABS 5.71 3.95   IMMATURE GRANS (ABS) 0.01 0.01   LYMPHS ABS 2.53 2.04   MONOS ABS 0.63 0.43   EOS ABS 0.05 0.05   MCV 86.6 88.0         Lab 03/01/24  0415 02/29/24  1120   SODIUM 138 140   POTASSIUM 3.5 3.7   CHLORIDE 100 100   CO2 23.9 29.1*   ANION GAP 14.1 10.9   BUN 11 8   CREATININE 0.82 0.91   EGFR 116.8 112.0   GLUCOSE 105* 110*   CALCIUM 9.5 9.7   MAGNESIUM 1.9 1.9         Lab 02/29/24  1120   TOTAL PROTEIN 7.4   ALBUMIN 4.4   GLOBULIN 3.0   ALT (SGPT) 19   AST (SGOT) 17   BILIRUBIN 1.2   ALK PHOS 74   LIPASE 31         Lab 02/29/24  1453 02/29/24  1120   PROBNP  --  201.7   HSTROP T 9 8                 Brief Urine Lab Results  (Last result in the past 365 days)        Color   Clarity   Blood   Leuk Est   Nitrite   Protein   CREAT   Urine HCG        12/06/23  1525 Yellow   Clear   Negative   Negative   Negative   Negative                 Microbiology Results (last 10 days)       ** No results found for the last 240 hours. **            XR Chest 1 View    Result Date: 2/29/2024   NO ACUTE PROCESS.       RON DIANE MD       Electronically Signed and Approved By: RON DIANE MD on 2/29/2024 at 11:43                           Labs Pending at Discharge:        Time spent on Discharge including face to face service: 33 minutes    Electronically signed by Ej Chapin MD, 03/01/24, 12:37 PM EST.

## 2024-03-01 NOTE — OUTREACH NOTE
Prep Survey      Flowsheet Row Responses   [facility] patient discharged from? [facility]   Is LACE score < 7 ? Yes   Eligibility [facility]   Date of Admission 02/29/24   Date of Discharge 03/01/24   Discharge Disposition Home or Self Care   Discharge diagnosis Atrial fibrillation with RVR   Does the patient have one of the following disease processes/diagnoses(primary or secondary)? Other   Does the patient have Home health ordered? No   Is there a DME ordered? No   Prep survey completed? Yes            Marie HAMPTON - Registered Nurse

## 2024-03-01 NOTE — CASE MANAGEMENT/SOCIAL WORK
Discharge Planning Assessment  [facility]     Patient Name: Vance Lorenzo  MRN: [id number]  Today's Date: 3/1/2024    Admit Date: 2/29/2024    Plan: Pt lives at home with his mother. Pt does not drive due to an eye condition. Pt has good support from family. Pt denies fin. stressors at this time. Pt does not work outside of the home, family is supportive, Pt can afford his medications with his ins. PCP: JAMEY Thayer, Pharm: [facility]. Pt plans to return home at discharge. SW will contiue to follow for needs.   Discharge Needs Assessment       Row Name 03/01/24 1151       Living Environment    People in Home parent(s)    Current Living Arrangements home    Potentially Unsafe Housing Conditions none    In the past 12 months has the electric, gas, oil, or water company threatened to shut off services in your home? No    Primary Care Provided by self    Provides Primary Care For no one    Family Caregiver if Needed parent(s)    Quality of Family Relationships helpful;involved;supportive    Able to Return to Prior Arrangements yes       Resource/Environmental Concerns    Resource/Environmental Concerns none    Transportation Concerns none       Transportation Needs    In the past 12 months, has lack of transportation kept you from medical appointments or from getting medications? no    In the past 12 months, has lack of transportation kept you from meetings, work, or from getting things needed for daily living? No       Food Insecurity    Within the past 12 months, you worried that your food would run out before you got the money to buy more. Never true    Within the past 12 months, the food you bought just didn't last and you didn't have money to get more. Never true       Transition Planning    Patient/Family Anticipates Transition to home with family    Patient/Family Anticipated Services at Transition none    Transportation Anticipated family or friend will provide       Discharge Needs Assessment     Readmission Within the Last 30 Days no previous admission in last 30 days    Equipment Currently Used at Home bp cuff;cpap    Concerns to be Addressed discharge planning    Anticipated Changes Related to Illness none    Equipment Needed After Discharge none    Discharge Coordination/Progress Pt lives at home with his mother. Pt does not drive due to an eye condition. Pt has good support from family. Pt denies fin. stressors at this time. Pt does not work outside of the home, family is supportive, Pt can afford his medications with his ins. PCP: ANGELO> Jeovany, [facility]: [facility]. Pt plans to return home at discharge. SW will contiue to follow for needs.                   Discharge Plan       Row Name 03/01/24 1156       Plan    Plan Pt lives at home with his mother. Pt does not drive due to an eye condition. Pt has good support from family. Pt denies fin. stressors at this time. Pt does not work outside of the home, family is supportive, Pt can afford his medications with his ins. PCP: ANGELO> Jeovany, [facility]. Pt plans to return home at discharge. SW will contiue to follow for needs.                  Continued Care and Services - Admitted Since 2/29/2024    Coordination has not been started for this encounter.          Demographic Summary       Row Name 03/01/24 1150       General Information    Admission Type inpatient    Arrived From emergency department    Referral Source admission list    Reason for Consult discharge planning    Preferred Language English       Contact Information    Permission Granted to Share Info With permission denied                   Functional Status       Row Name 03/01/24 1151       Functional Status    Usual Activity Tolerance good    Current Activity Tolerance good       Physical Activity    On average, how many days per week do you engage in moderate to strenuous exercise (like a brisk walk)? 0 days    On average, how many minutes do you engage in exercise at this level?  0 min    Number of minutes of exercise per week 0       Assessment of Health Literacy    How often do you have someone help you read hospital materials? Never    How often do you have problems learning about your medical condition because of difficulty understanding written information? Never    How often do you have a problem understanding what is told to you about your medical condition? Never    How confident are you filling out medical forms by yourself? Quite a bit    Health Literacy Good       Functional Status, IADL    Medications independent    Meal Preparation independent    Housekeeping independent    Laundry independent    Shopping independent       Mental Status    General Appearance WDL WDL       Mental Status Summary    Recent Changes in Mental Status/Cognitive Functioning no changes       Employment/    Employment Status unemployed                   Psychosocial    No documentation.                  Abuse/Neglect    No documentation.                  Legal       Row Name 03/01/24 1151       Financial Resource Strain    How hard is it for you to pay for the very basics like food, housing, medical care, and heating? Not hard       Financial/Legal    Source of Income none    Application for Public Assistance not applied       Legal    Criminal Activity/Legal Involvement none                   Substance Abuse    No documentation.                  Patient Forms    No documentation.                     Hali Kuhn

## 2024-03-01 NOTE — CONSULTS
Patient completed home sleep study 09/21/22 and has auto cpap 6-18cm H2O with [employer].  Last compliance report is dated 01/29/23. Patient usage >4hrs was on 17 days; 37%.    Therapy Report:  95th percentile  Pressure             10 cm H2O                   Leak     11.5    L/min   AHI         1.2  Events/hour      I will provide patient with [employer]'s contact information; hopefully device is still under warranty.   N/A

## 2024-03-01 NOTE — PLAN OF CARE
Goal Outcome Evaluation:           Progress: no change  Outcome Evaluation: Patient was on room air tolerating well and cpap is on standby at bedside.

## 2024-03-02 ENCOUNTER — TRANSITIONAL CARE MANAGEMENT TELEPHONE ENCOUNTER (OUTPATIENT)
Dept: CALL CENTER | Facility: HOSPITAL | Age: 39
End: 2024-03-02
Payer: COMMERCIAL

## 2024-03-02 NOTE — OUTREACH NOTE
Call Center TCM Note      Flowsheet Row Responses   [facility] patient discharged from? [facility]   Does the patient have one of the following disease processes/diagnoses(primary or secondary)? Other   TCM attempt successful? No   Unsuccessful attempts Attempt 2            Corin Melendez RN    3/2/2024, 11:50 EST

## 2024-03-02 NOTE — OUTREACH NOTE
Call Center TCM Note      Flowsheet Row Responses   [facility] patient discharged from? [facility]   Does the patient have one of the following disease processes/diagnoses(primary or secondary)? Other   TCM attempt successful? No  [Mother]   Unsuccessful attempts Attempt 1   Call Status Left message            Corin Melendez RN    3/2/2024, 11:09 EST

## 2024-03-03 ENCOUNTER — TRANSITIONAL CARE MANAGEMENT TELEPHONE ENCOUNTER (OUTPATIENT)
Dept: CALL CENTER | Facility: HOSPITAL | Age: 39
End: 2024-03-03
Payer: COMMERCIAL

## 2024-03-03 NOTE — OUTREACH NOTE
Call Center TCM Note      Flowsheet Row Responses   [facility] facility patient discharged from? [facility]   Does the patient have one of the following disease processes/diagnoses(primary or secondary)? Other   TCM attempt successful? No   Unsuccessful attempts Attempt 3            Connie Ocampo RN    3/3/2024, 08:40 EST

## 2024-03-04 ENCOUNTER — OFFICE VISIT (OUTPATIENT)
Dept: INTERNAL MEDICINE | Facility: CLINIC | Age: 39
End: 2024-03-04
Payer: COMMERCIAL

## 2024-03-04 VITALS
BODY MASS INDEX: 35.14 KG/M2 | SYSTOLIC BLOOD PRESSURE: 138 MMHG | DIASTOLIC BLOOD PRESSURE: 88 MMHG | HEART RATE: 88 BPM | HEIGHT: 77 IN | WEIGHT: 297.6 LBS | TEMPERATURE: 99.1 F | OXYGEN SATURATION: 98 %

## 2024-03-04 DIAGNOSIS — I48.0 PAROXYSMAL ATRIAL FIBRILLATION: ICD-10-CM

## 2024-03-04 DIAGNOSIS — B35.3 TINEA PEDIS OF BOTH FEET: ICD-10-CM

## 2024-03-04 DIAGNOSIS — L84 CALLUS OF FOOT: ICD-10-CM

## 2024-03-04 DIAGNOSIS — G47.33 OSA ON CPAP: ICD-10-CM

## 2024-03-04 DIAGNOSIS — I10 ESSENTIAL HYPERTENSION: ICD-10-CM

## 2024-03-04 DIAGNOSIS — I48.91 ATRIAL FIBRILLATION WITH RAPID VENTRICULAR RESPONSE: Primary | ICD-10-CM

## 2024-03-04 DIAGNOSIS — R07.9 CHEST PAIN, UNSPECIFIED TYPE: ICD-10-CM

## 2024-03-04 DIAGNOSIS — R09.81 NASAL CONGESTION: ICD-10-CM

## 2024-03-04 DIAGNOSIS — J34.89 RHINORRHEA: ICD-10-CM

## 2024-03-04 LAB
EXPIRATION DATE: NORMAL
EXPIRATION DATE: NORMAL
FLUAV AG UPPER RESP QL IA.RAPID: NOT DETECTED
FLUBV AG UPPER RESP QL IA.RAPID: NOT DETECTED
INTERNAL CONTROL: NORMAL
INTERNAL CONTROL: NORMAL
Lab: NORMAL
Lab: NORMAL
S PYO AG THROAT QL: NEGATIVE
SARS-COV-2 AG UPPER RESP QL IA.RAPID: NOT DETECTED
SARS-COV-2 RNA RESP QL NAA+PROBE: NOT DETECTED

## 2024-03-04 PROCEDURE — 87081 CULTURE SCREEN ONLY: CPT | Performed by: STUDENT IN AN ORGANIZED HEALTH CARE EDUCATION/TRAINING PROGRAM

## 2024-03-04 PROCEDURE — 87635 SARS-COV-2 COVID-19 AMP PRB: CPT | Performed by: STUDENT IN AN ORGANIZED HEALTH CARE EDUCATION/TRAINING PROGRAM

## 2024-03-04 RX ORDER — PRENATAL VIT 91/IRON/FOLIC/DHA 28-975-200
1 COMBINATION PACKAGE (EA) ORAL 2 TIMES DAILY
Qty: 42 G | Refills: 0 | Status: SHIPPED | OUTPATIENT
Start: 2024-03-04 | End: 2024-03-25

## 2024-03-04 NOTE — PROGRESS NOTES
Transitional Care Follow Up Visit  Subjective     Vance Lorenzo is a 38 y.o. male who presents for a transitional care management visit.    Within 48 business hours after discharge our office contacted him via telephone to coordinate his care and needs.      I reviewed and discussed the details of that call along with the discharge summary, hospital problems, inpatient lab results, inpatient diagnostic studies, and consultation reports with Vance.     Current outpatient and discharge medications have been reconciled for the patient.  Reviewed by: Yobany Rush MD          3/1/2024     4:39 PM   Date of TCM Phone Call   [facility]   Date of Admission 2/29/2024   Date of Discharge 3/1/2024   Discharge Disposition Home or Self Care     Risk for Readmission (LACE) Score: 4 (3/1/2024  6:00 AM)      History of Present Illness     Course During Hospital Stay:       The following portions of the patient's history were reviewed and updated as appropriate: allergies, current medications, past family history, past medical history, past social history, past surgical history, and problem list.    Hospital Course:  Vance Lorenzo is a 38 y.o. male PMH HTN, HLD, and breath and palpitations.  Patient states his symptoms actually started last night around 7 PM where he felt his heart was racing and was irregular.  He states it improved and seem to calm down around 1230 or 1 and he went to sleep.  This morning, he still felt intermittent palpitations.  He called his primary cardiologist office who recommended he go to the ED.  In the ED he was found to be in A-fib with RVR with rates up to the 160s.  He was given a push of diltiazem, started on diltiazem drip and admission was requested.  Of note, he has a CPAP bedside difficulty using it as the mask is broken.  He is try to call insurance and the company but they have been unable to fix the issue.  For that reason, he has not been using his  "CPAP for the last week.  Cardiology was consulted, his flecainide was increased to twice daily and his diltiazem was increased.  He was weaned off the diltiazem drip.  He went in and out of A-fib with normal sinus rhythm during his hospital stay so cardioversion was deferred.  Her heart rate was controlled off diltiazem drip and he was discharged home in stable condition on 3/1/2024 with an increase in his medication regimen.  Recommend follow-up with cardiology in 2 weeks, recommend EP evaluation on outpatient basis for consideration of ablation    Interim Course:    Mr. Lorenzo reports has been wearing CPAP, but reports that as a piece is broken sometimes the seal is lost.    He reports that one night in the hospital felt a sharp right sided sensation of chest pain.  He reported to provider, and they advised watchful waiting for the moment.  He reports that last night had further episodes.  Reports that it feels like a \"jolt\", and this time on both right and left sides of chest.  Lasts seconds.  Happened 4-5 times.  No discernible triggers, no known exacerbating or alleviating factors.  He will be seeing cardiology for follow up in the near term.  They are contemplating EP evaluation and possible ablation.    Mr. Lorenzo reports since coming home has had nasal congestion.  He denies significant cough, fever, sore throat or other sick symptoms.    He reports having a rash on both his feet, as well as calluses on his soles.  He would like to see a foot doctor.        Objective   Vitals:    03/04/24 1155   BP: 138/88   Pulse:    Temp:    SpO2:      /88   Pulse 88   Temp 99.1 °F (37.3 °C) (Temporal)   Ht 195.6 cm (77\")   Wt 135 kg (297 lb 9.6 oz)   SpO2 98%   BMI 35.29 kg/m²     Appearance: No acute distress, well-nourished  Head: normocephalic, atraumatic  Eyes: no scleral icterus, no conjunctival injection  Ears, Nose, and Throat: external ears normal  Cardiovascular: regular rate and rhythm. no murmurs, " rales, or rhonchi. no edema  Respiratory: breathing comfortably, symmetric chest rise, clear to auscultation bilaterally. No wheezes, rales, or rhonchi.  Neuro: alert and oriented, answers questions appropriately  Derm: erythematous macules noted bilateral feet.  Some maceration between toes noted.  Heavy callus noted, bilateral heels  Psych: normal mood and affect     Result Review :   The following data was reviewed by: Yobany Rush MD on 03/04/2024:  Common labs          2/1/2024    15:13 2/29/2024    11:20 3/1/2024    04:15   Common Labs   Glucose 87  110  105    BUN 10  8  11    Creatinine 0.90  0.91  0.82    Sodium 140  140  138    Potassium 3.5  3.7  3.5    Chloride 101  100  100    Calcium 10.1  9.7  9.5    Albumin 4.7  4.4     Total Bilirubin 1.4  1.2     Alkaline Phosphatase 77  74     AST (SGOT) 21  17     ALT (SGPT) 24  19     WBC 7.71  6.52  8.97    Hemoglobin 17.4  16.7  16.3    Hematocrit 49.5  48.2  46.5    Platelets 243  223  231        No orders to display            Lab Results   Component Value Date    SARSANTIGEN Not Detected 03/04/2024    FLUAAG Not Detected 03/04/2024    FLUBAG Not Detected 03/04/2024    RAPSCRN Negative 03/04/2024    INR 1.20 (H) 02/01/2024    BILIRUBINUR Negative 12/06/2023       Assessment & Plan     Diagnoses and all orders for this visit:    1. Atrial fibrillation with rapid ventricular response (Primary)    2. Paroxysmal atrial fibrillation    3. Essential hypertension    4. Rhinorrhea    5. Callus of both foot  -     Ambulatory Referral to Podiatry    6. Tinea pedis of both feet  -     terbinafine (LamISIL AT) 1 % cream; Apply 1 Application topically to the appropriate area as directed 2 (Two) Times a Day for 21 days.  Dispense: 42 g; Refill: 0    7. Nasal congestion  -     Beta Strep Culture, Throat - , Throat; Future  -     COVID-19,CEPHEID/YOGI,COR/JEROME/PAD/JERRY/LAG/BELEM IN-HOUSE,NP SWAB IN TRANSPORT MEDIA 1 HR TAT, RT-PCR - Swab, Nasopharynx  -     POCT SARS-CoV-2  + Flu Antigen JAIME  -     POC Rapid Strep A  -     Beta Strep Culture, Throat - Swab, Throat    8. Chest pain, unspecified type    9. NANETTE on CPAP      A fib with RVR:  -resolved  -on fleicanide, eliquis, diltiazem    CP:  -atypical chest pain, and will be seeing his cardiologist in near term for follow up  -I advised that if sustained chest pain, should contact 911 for ambulance transportation and evaluation in the ER    NANETTE:  -I did encourage him to discuss mask problems with sleep medicine    There are no discontinued medications.    Return if symptoms worsen or fail to improve.           Yobany Rush MD  03/04/24  12:34 EST

## 2024-03-06 LAB — BACTERIA SPEC AEROBE CULT: NORMAL

## 2024-03-21 PROBLEM — I48.91 ATRIAL FIBRILLATION WITH RVR: Status: RESOLVED | Noted: 2024-02-29 | Resolved: 2024-03-21

## 2024-04-08 ENCOUNTER — OFFICE VISIT (OUTPATIENT)
Dept: PODIATRY | Facility: CLINIC | Age: 39
End: 2024-04-08
Payer: COMMERCIAL

## 2024-04-08 VITALS
HEIGHT: 77 IN | OXYGEN SATURATION: 96 % | DIASTOLIC BLOOD PRESSURE: 96 MMHG | WEIGHT: 301 LBS | BODY MASS INDEX: 35.54 KG/M2 | TEMPERATURE: 96.9 F | SYSTOLIC BLOOD PRESSURE: 163 MMHG | HEART RATE: 69 BPM

## 2024-04-08 DIAGNOSIS — M79.672 FOOT PAIN, BILATERAL: ICD-10-CM

## 2024-04-08 DIAGNOSIS — L74.4 ANHIDROSIS: Primary | ICD-10-CM

## 2024-04-08 DIAGNOSIS — M79.671 FOOT PAIN, BILATERAL: ICD-10-CM

## 2024-04-08 PROCEDURE — 99243 OFF/OP CNSLTJ NEW/EST LOW 30: CPT | Performed by: PODIATRIST

## 2024-04-08 PROCEDURE — 1159F MED LIST DOCD IN RCRD: CPT | Performed by: PODIATRIST

## 2024-04-08 PROCEDURE — 1160F RVW MEDS BY RX/DR IN RCRD: CPT | Performed by: PODIATRIST

## 2024-04-08 PROCEDURE — 3080F DIAST BP >= 90 MM HG: CPT | Performed by: PODIATRIST

## 2024-04-08 PROCEDURE — 3077F SYST BP >= 140 MM HG: CPT | Performed by: PODIATRIST

## 2024-04-08 RX ORDER — AMMONIUM LACTATE 12 G/100G
LOTION TOPICAL 2 TIMES DAILY
Qty: 225 G | Refills: 11 | Status: SHIPPED | OUTPATIENT
Start: 2024-04-08

## 2024-04-08 NOTE — PROGRESS NOTES
[facility] - PODIATRY    Today's Date: 04/08/24    Patient Name: Vance Lorenzo  MRN: [id number]  CSN: [id number]  PCP: Onesimo Thayer Jr., MD  Referring Provider: Yobany Rush MD    SUBJECTIVE     Chief Complaint   Patient presents with    Left Foot - Callouses, Establish Care    Right Foot - Callouses     HPI: Vance Lorenzo, a 39 y.o.male, presents to clinic.    New, Established, New Problem: New  Location: Bilateral feet  Duration:   Greater than 1 month  Onset: Insidious  Nature: Dry skin  Stable, worsening, improving:  Worsening  Aggravating factors:     Previous Treatment:  Slowly worsening despite using OTC skin lotion.    Patient denies any fevers, chills, nausea, vomiting, shortness of breathe, nor any other constitutional signs nor symptoms.    Past Medical History:   Diagnosis Date    Essential hypertension 12/01/2021    Hyperlipidemia LDL goal <100 12/01/2021    Paroxysmal atrial fibrillation 12/01/2021    Tinnitus     Xerosis of skin 08/17/2018     History reviewed. No pertinent surgical history.  Family History   Problem Relation Age of Onset    Breast cancer Maternal Grandmother         50s    Cancer Paternal Grandmother     Cancer Paternal Grandfather     Heart failure Paternal Grandfather      Social History     Socioeconomic History    Marital status: Single   Tobacco Use    Smoking status: Never     Passive exposure: Never    Smokeless tobacco: Never   Vaping Use    Vaping status: Never Used   Substance and Sexual Activity    Alcohol use: Never    Drug use: Never    Sexual activity: Defer     Allergies   Allergen Reactions    Metoprolol Tinnitus     Current Outpatient Medications   Medication Sig Dispense Refill    dilTIAZem CD (CARDIZEM CD) 360 MG 24 hr capsule Take 1 capsule by mouth Daily. 90 capsule 1    Eliquis 5 MG tablet tablet TAKE ONE TABLET BY MOUTH TWICE DAILY 180 tablet 3    flecainide (TAMBOCOR) 100 MG tablet Take 1 tablet by mouth 2 (Two)  Times a Day. 180 tablet 0    loteprednol (LOTEMAX) 0.5 % ophthalmic suspension Administer 1 drop to both eyes Daily.      rosuvastatin (CRESTOR) 5 MG tablet Take 1 tablet by mouth Every Night. 90 tablet 3    ammonium lactate (LAC-HYDRIN) 12 % lotion Apply  topically to the appropriate area as directed 2 (Two) Times a Day. 225 g 11     No current facility-administered medications for this visit.     Review of Systems   Constitutional: Negative.    Skin:         Dry skin   All other systems reviewed and are negative.      OBJECTIVE     Vitals:    04/08/24 1454   BP: 163/96   Pulse: 69   Temp: 96.9 °F (36.1 °C)   SpO2: 96%       WBC   Date Value Ref Range Status   03/01/2024 8.97 3.40 - 10.80 10*3/mm3 Final     RBC   Date Value Ref Range Status   03/01/2024 5.37 4.14 - 5.80 10*6/mm3 Final     Hemoglobin   Date Value Ref Range Status   03/01/2024 16.3 13.0 - 17.7 g/dL Final     Hematocrit   Date Value Ref Range Status   03/01/2024 46.5 37.5 - 51.0 % Final     MCV   Date Value Ref Range Status   03/01/2024 86.6 79.0 - 97.0 fL Final     MCH   Date Value Ref Range Status   03/01/2024 30.4 26.6 - 33.0 pg Final     MCHC   Date Value Ref Range Status   03/01/2024 35.1 31.5 - 35.7 g/dL Final     RDW   Date Value Ref Range Status   03/01/2024 12.0 (L) 12.3 - 15.4 % Final     RDW-SD   Date Value Ref Range Status   03/01/2024 38.1 37.0 - 54.0 fl Final     MPV   Date Value Ref Range Status   03/01/2024 12.2 (H) 6.0 - 12.0 fL Final     Platelets   Date Value Ref Range Status   03/01/2024 231 140 - 450 10*3/mm3 Final     Neutrophil %   Date Value Ref Range Status   03/01/2024 63.7 42.7 - 76.0 % Final     Lymphocyte %   Date Value Ref Range Status   03/01/2024 28.2 19.6 - 45.3 % Final     Monocyte %   Date Value Ref Range Status   03/01/2024 7.0 5.0 - 12.0 % Final     Eosinophil %   Date Value Ref Range Status   03/01/2024 0.6 0.3 - 6.2 % Final     Basophil %   Date Value Ref Range Status   03/01/2024 0.4 0.0 - 1.5 % Final      Immature Grans %   Date Value Ref Range Status   03/01/2024 0.1 0.0 - 0.5 % Final     Neutrophils, Absolute   Date Value Ref Range Status   03/01/2024 5.71 1.70 - 7.00 10*3/mm3 Final     Lymphocytes, Absolute   Date Value Ref Range Status   03/01/2024 2.53 0.70 - 3.10 10*3/mm3 Final     Monocytes, Absolute   Date Value Ref Range Status   03/01/2024 0.63 0.10 - 0.90 10*3/mm3 Final     Eosinophils, Absolute   Date Value Ref Range Status   03/01/2024 0.05 0.00 - 0.40 10*3/mm3 Final     Basophils, Absolute   Date Value Ref Range Status   03/01/2024 0.04 0.00 - 0.20 10*3/mm3 Final     Immature Grans, Absolute   Date Value Ref Range Status   03/01/2024 0.01 0.00 - 0.05 10*3/mm3 Final     nRBC   Date Value Ref Range Status   03/01/2024 0.0 0.0 - 0.2 /100 WBC Final         Lab Results   Component Value Date    GLUCOSE 105 (H) 03/01/2024    BUN 11 03/01/2024    CREATININE 0.82 03/01/2024    EGFR 116.8 03/01/2024    BCR 13.4 03/01/2024    K 3.5 03/01/2024    CO2 23.9 03/01/2024    CALCIUM 9.5 03/01/2024    ALBUMIN 4.4 02/29/2024    BILITOT 1.2 02/29/2024    AST 17 02/29/2024    ALT 19 02/29/2024       Patient seen in no apparent distress.      PHYSICAL EXAM:     Foot/Ankle Exam    GENERAL  Appearance:  obese  Orientation:  AAOx3  Affect:  appropriate  Gait:  unimpaired  Assistance:  independent  Right shoe gear: casual shoe  Left shoe gear: casual shoe    VASCULAR     Right Foot Vascularity   Normal vascular exam    Dorsalis pedis:  2+  Posterior tibial:  2+  Skin temperature:  warm  Edema grading:  None  CFT:  < 3 seconds  Pedal hair growth:  Present  Varicosities:  none     Left Foot Vascularity   Normal vascular exam    Dorsalis pedis:  2+  Posterior tibial:  2+  Skin temperature:  warm  Edema grading:  None  CFT:  < 3 seconds  Pedal hair growth:  Present  Varicosities:  none     NEUROLOGIC     Right Foot Neurologic   Normal sensation    Light touch sensation: normal  Vibratory sensation: normal  Hot/Cold sensation:  normal  Protective Sensation using [place]icks[place] Monofilament:   Sites intact: 10  Sites tested: 10     Left Foot Neurologic   Normal sensation    Light touch sensation: normal  Vibratory sensation: normal  Hot/Cold sensation:  normal  Protective Sensation using [place]Dickson Monofilament:   Sites intact: 10  Sites tested: 10    MUSCLE STRENGTH     Right Foot Muscle Strength   Foot dorsiflexion:  4  Foot plantar flexion:  4  Foot inversion:  4  Foot eversion:  4     Left Foot Muscle Strength   Foot dorsiflexion:  4  Foot plantar flexion:  4  Foot inversion:  4  Foot eversion:  4    RANGE OF MOTION     Right Foot Range of Motion   Foot and ankle ROM within normal limits       Left Foot Range of Motion   Foot and ankle ROM within normal limits      DERMATOLOGIC      Right Foot Dermatologic   Skin  Positive for dryness.      Left Foot Dermatologic   Skin  Positive for dryness.     ASSESSMENT/PLAN     Diagnoses and all orders for this visit:    1. Anhidrosis (Primary)  -     ammonium lactate (LAC-HYDRIN) 12 % lotion; Apply  topically to the appropriate area as directed 2 (Two) Times a Day.  Dispense: 225 g; Refill: 11    2. Foot pain, bilateral        Comprehensive lower extremity examination and evaluation was performed.    Discussed findings and treatment plan including risks, benefits, and treatment options with patient in detail. Patient agreed with treatment plan.    Medications and allergies reviewed.  Reviewed available lab values along with other pertinent labs.  These were discussed with the patient.    An After Visit Summary was printed and given to the patient at discharge, including (if requested) any available informative/educational handouts regarding diagnosis, treatment, or medications. All questions were answered to patient/family satisfaction. Should symptoms fail to improve or worsen they agree to call or return to clinic or to go to the Emergency Department. Discussed the importance of  following up with any needed screening tests/labs/specialist appointments and any requested follow-up recommended by me today. Importance of maintaining follow-up discussed and patient accepts that missed appointments can delay diagnosis and potentially lead to worsening of conditions.    Return if symptoms worsen or fail to improve., or sooner if acute issues arise.    This document has been electronically signed by Suman Amaya DPM on April 8, 2024 15:25 EDT

## 2024-04-08 NOTE — LETTER
April 8, 2024       No Recipients    Patient: Vance Lorenzo   YOB: 1985   Date of Visit: 4/8/2024       Dear Yobany Rush MD:    Thank you for referring Vance Lorenzo to me for evaluation. Below are the relevant portions of my assessment and plan of care.    Encounter Diagnosis and Orders:  Diagnoses and all orders for this visit:    1. Anhidrosis (Primary)  -     ammonium lactate (LAC-HYDRIN) 12 % lotion; Apply  topically to the appropriate area as directed 2 (Two) Times a Day.  Dispense: 225 g; Refill: 11    2. Foot pain, bilateral        If you have questions, please do not hesitate to call me. I look forward to following Vance along with you.         Sincerely,        Suman Amaya DPM        CC:   No Recipients

## 2024-04-11 ENCOUNTER — TELEPHONE (OUTPATIENT)
Dept: CARDIOLOGY | Facility: CLINIC | Age: 39
End: 2024-04-11
Payer: COMMERCIAL

## 2024-04-11 NOTE — TELEPHONE ENCOUNTER
SPOKE TO SATURNINO AND LET HER KNOW THAT WE SWITCHED HER APPOINTMENT WITH [facility]S APPOINTMENT AS REQUESTED.

## 2024-04-11 NOTE — TELEPHONE ENCOUNTER
Caller: SATURNINO BANKS    Relationship to patient: Mother    Best call back number: [phone]      Type of visit: HFU     Requested date: 4/18/24        Additional notes:PATIENT HAS APPOINTMENT OF 4/23/24 WITH [facility] BUT PATIENT WOULD RATHER SEE DR. NEWBY ON 4-18-24 IF POSSIBLE. PATIENT'S MOTHER HAS APPOINTMENT ON THAT DAY AND DOESN'T MINE TO SWITCH WITH HIM.

## 2024-04-18 ENCOUNTER — OFFICE VISIT (OUTPATIENT)
Dept: CARDIOLOGY | Facility: CLINIC | Age: 39
End: 2024-04-18
Payer: COMMERCIAL

## 2024-04-18 VITALS
BODY MASS INDEX: 35.66 KG/M2 | WEIGHT: 302 LBS | HEART RATE: 80 BPM | DIASTOLIC BLOOD PRESSURE: 76 MMHG | SYSTOLIC BLOOD PRESSURE: 145 MMHG | HEIGHT: 77 IN

## 2024-04-18 DIAGNOSIS — I48.0 PAROXYSMAL ATRIAL FIBRILLATION: Primary | ICD-10-CM

## 2024-04-18 DIAGNOSIS — I10 ESSENTIAL HYPERTENSION: ICD-10-CM

## 2024-04-18 NOTE — PROGRESS NOTES
Chief Complaint  Follow-up, Atrial Fibrillation, Hypertension, and Hyperlipidemia    Subjective    Patient is following up after recent episode of recurrent atrial fibrillation with rapid ventricular rate for which she was symptomatic he was increased up on his flecainide as well as diltiazem he converted back spontaneously in the hospital symptom wise has had 1 episode since hospitalization of about 30 minutes recurrent atrial fibrillation.  He denies any syncope or presyncopal episodes  Regular rate and rhythm  Past Medical History:   Diagnosis Date    Essential hypertension 12/01/2021    Hyperlipidemia LDL goal <100 12/01/2021    Paroxysmal atrial fibrillation 12/01/2021    Tinnitus     Xerosis of skin 08/17/2018         Current Outpatient Medications:     ammonium lactate (LAC-HYDRIN) 12 % lotion, Apply  topically to the appropriate area as directed 2 (Two) Times a Day., Disp: 225 g, Rfl: 11    dilTIAZem CD (CARDIZEM CD) 360 MG 24 hr capsule, Take 1 capsule by mouth Daily., Disp: 90 capsule, Rfl: 1    Eliquis 5 MG tablet tablet, TAKE ONE TABLET BY MOUTH TWICE DAILY, Disp: 180 tablet, Rfl: 3    flecainide (TAMBOCOR) 100 MG tablet, Take 1 tablet by mouth 2 (Two) Times a Day., Disp: 180 tablet, Rfl: 0    loteprednol (LOTEMAX) 0.5 % ophthalmic suspension, Administer 1 drop to both eyes Daily., Disp: , Rfl:     rosuvastatin (CRESTOR) 5 MG tablet, Take 1 tablet by mouth Every Night., Disp: 90 tablet, Rfl: 3    There are no discontinued medications.  Allergies   Allergen Reactions    Metoprolol Tinnitus        Social History     Tobacco Use    Smoking status: Never     Passive exposure: Never    Smokeless tobacco: Never   Vaping Use    Vaping status: Never Used   Substance Use Topics    Alcohol use: Never    Drug use: Never       Family History   Problem Relation Age of Onset    Breast cancer Maternal Grandmother         50s    Cancer Paternal Grandmother     Cancer Paternal Grandfather     Heart failure Paternal  "Grandfather         Objective     /76   Pulse 80   Ht 195.6 cm (77\")   Wt (!) 137 kg (302 lb)   BMI 35.81 kg/m²       Physical Exam    General Appearance:   no acute distress  Alert and oriented x3  HENT:   lips not cyanotic  Atraumatic  Neck:  No jvd   supple  Respiratory:  no respiratory distress  normal breath sounds  no rales  Cardiovascular:  Regular rate and rhythm  no S3, no S4   no murmur  no rub  Extremities  No cyanosis  lower extremity edema: Is  Skin:   warm, dry  No rashes      Result Review :     proBNP   Date Value Ref Range Status   02/29/2024 201.7 0.0 - 450.0 pg/mL Final     CMP          2/1/2024    15:13 2/29/2024    11:20 3/1/2024    04:15   CMP   Glucose 87  110  105    BUN 10  8  11    Creatinine 0.90  0.91  0.82    EGFR 113.5  112.0  116.8    Sodium 140  140  138    Potassium 3.5  3.7  3.5    Chloride 101  100  100    Calcium 10.1  9.7  9.5    Total Protein 8.0  7.4     Albumin 4.7  4.4     Globulin 3.3  3.0     Total Bilirubin 1.4  1.2     Alkaline Phosphatase 77  74     AST (SGOT) 21  17     ALT (SGPT) 24  19     Albumin/Globulin Ratio 1.4  1.5     BUN/Creatinine Ratio 11.1  8.8  13.4    Anion Gap 15.2  10.9  14.1      CBC w/diff          2/1/2024    15:13 2/29/2024    11:20 3/1/2024    04:15   CBC w/Diff   WBC 7.71  6.52  8.97    RBC 5.70  5.48  5.37    Hemoglobin 17.4  16.7  16.3    Hematocrit 49.5  48.2  46.5    MCV 86.8  88.0  86.6    MCH 30.5  30.5  30.4    MCHC 35.2  34.6  35.1    RDW 12.6  12.2  12.0    Platelets 243  223  231    Neutrophil Rel %  60.5  63.7    Immature Granulocyte Rel %  0.2  0.1    Lymphocyte Rel %  31.3  28.2    Monocyte Rel %  6.6  7.0    Eosinophil Rel %  0.8  0.6    Basophil Rel %  0.6  0.4       Lab Results   Component Value Date    TSH 1.380 05/08/2023      Lab Results   Component Value Date    FREET4 1.1 02/11/2019      No results found for: \"DDIMERQUANT\"  Magnesium   Date Value Ref Range Status   03/01/2024 1.9 1.6 - 2.6 mg/dL Final      No results " "found for: \"DIGOXIN\"   Lab Results   Component Value Date    TROPONINT 9 02/29/2024           Lipid Panel          5/8/2023    10:56 12/6/2023    15:25   Lipid Panel   Total Cholesterol 271  168    Triglycerides 139  131    HDL Cholesterol 49  38    VLDL Cholesterol 25  24    LDL Cholesterol  197  106    LDL/HDL Ratio 3.96  2.73      Lab Results   Component Value Date    POCTROP 0.00 01/15/2022            ECG 12 Lead    Date/Time: 4/18/2024 2:15 PM  Performed by: Damien Castellano MD    Authorized by: Damien Castellano MD  Comparison: compared with previous ECG   Similar to previous ECG  Rhythm: sinus rhythm  Conduction: 1st degree AV block                 Diagnoses and all orders for this visit:    1. Paroxysmal atrial fibrillation (Primary)  Assessment & Plan:  Patient with recurrent symptomatic atrial fibrillation now back in normal sinus rhythm 1 breakthrough episodes since increase flecainide dose to 100 twice daily.  Discussed with patient rate versus rhythm control strategy he was interested in hearing more about the potential for possible ablation procedures if his flecainide stops being as effective at maintaining normal sinus rhythm will refer to electrophysiology for further discussion.  Did  patient on the importance of exercise and weight management if normal sinus rhythm is meant to be maintained.  Patient is on Eliquis 5 twice daily for CVA prevention    Orders:  -     Ambulatory Referral to Cardiac Electrophysiology  -     ECG 12 Lead    2. Essential hypertension  Assessment & Plan:  Blood pressure well-controlled continue with diltiazem.  60 mg daily   Counseled patient on  low-sodium diet of less than 2 g  Aerobic activity 30 minutes a day 5 times a week  Weight loss                  Follow Up     Return in about 6 months (around 10/18/2024) for EKG with F/U.          Patient was given instructions and counseling regarding his condition or for health maintenance advice. Please see specific " information pulled into the AVS if appropriate.

## 2024-04-18 NOTE — ASSESSMENT & PLAN NOTE
Blood pressure well-controlled continue with diltiazem.  60 mg daily   Counseled patient on  low-sodium diet of less than 2 g  Aerobic activity 30 minutes a day 5 times a week  Weight loss

## 2024-04-18 NOTE — ASSESSMENT & PLAN NOTE
Patient with recurrent symptomatic atrial fibrillation now back in normal sinus rhythm 1 breakthrough episodes since increase flecainide dose to 100 twice daily.  Discussed with patient rate versus rhythm control strategy he was interested in hearing more about the potential for possible ablation procedures if his flecainide stops being as effective at maintaining normal sinus rhythm will refer to electrophysiology for further discussion.  Did  patient on the importance of exercise and weight management if normal sinus rhythm is meant to be maintained.  Patient is on Eliquis 5 twice daily for CVA prevention

## 2024-05-10 RX ORDER — ROSUVASTATIN CALCIUM 5 MG/1
5 TABLET, COATED ORAL
Qty: 90 TABLET | Refills: 3 | Status: SHIPPED | OUTPATIENT
Start: 2024-05-10

## 2024-06-10 ENCOUNTER — OFFICE VISIT (OUTPATIENT)
Dept: SLEEP MEDICINE | Facility: HOSPITAL | Age: 39
End: 2024-06-10
Payer: COMMERCIAL

## 2024-06-10 VITALS
DIASTOLIC BLOOD PRESSURE: 78 MMHG | SYSTOLIC BLOOD PRESSURE: 139 MMHG | OXYGEN SATURATION: 99 % | WEIGHT: 294 LBS | HEIGHT: 77 IN | BODY MASS INDEX: 34.71 KG/M2 | HEART RATE: 77 BPM

## 2024-06-10 DIAGNOSIS — I10 ESSENTIAL HYPERTENSION: ICD-10-CM

## 2024-06-10 DIAGNOSIS — I48.0 PAROXYSMAL ATRIAL FIBRILLATION: ICD-10-CM

## 2024-06-10 DIAGNOSIS — E66.9 CLASS 2 OBESITY: ICD-10-CM

## 2024-06-10 DIAGNOSIS — G47.33 OSA ON CPAP: Primary | ICD-10-CM

## 2024-06-10 PROCEDURE — G0463 HOSPITAL OUTPT CLINIC VISIT: HCPCS

## 2024-06-10 PROCEDURE — 99213 OFFICE O/P EST LOW 20 MIN: CPT | Performed by: INTERNAL MEDICINE

## 2024-06-10 PROCEDURE — 3075F SYST BP GE 130 - 139MM HG: CPT | Performed by: INTERNAL MEDICINE

## 2024-06-10 PROCEDURE — 1159F MED LIST DOCD IN RCRD: CPT | Performed by: INTERNAL MEDICINE

## 2024-06-10 PROCEDURE — 3078F DIAST BP <80 MM HG: CPT | Performed by: INTERNAL MEDICINE

## 2024-06-10 PROCEDURE — 1160F RVW MEDS BY RX/DR IN RCRD: CPT | Performed by: INTERNAL MEDICINE

## 2024-06-10 NOTE — PROGRESS NOTES
"  [address] [address]  [place]   [place] [address]  Phone: [phone]  Fax: [phone]      SLEEP CLINIC FOLLOW UP PROGRESS NOTE.    Vance Lorenzo  [id number]   1985  39 y.o.  male      PCP: Onesimo Thayer Jr., MD      Date of visit: 6/10/2024    Chief Complaint   Patient presents with    Sleep Apnea    Follow-up       HPI:  This is a 39 y.o. years old patient is here for the management of obstructive sleep apnea.  Sleep apnea is moderate in severity with a AHI of 16.5/hr. Patient is using positive airway pressure therapy with CPAP 8 and the symptoms of sleep apnea have improved significantly on the therapy. Normally patient goes to bed at 11 PM and wakes up at 8 AM .  The patient wakes up 1 time(s) during the night and has no problem going back to sleep.  Feels refreshed after waking up.  He also has a history of hypertension and atrial fibrillation and is on medical therapy    Medications and allergies are reviewed by me and documented in the encounter.     SOCIAL (habits pertaining to sleep medicine)  History tobacco use:No   History of alcohol use: 0 per week  Caffeine use: 1     REVIEW OF SYSTEMS:   Pertaining positive symptoms are:  [place] Sleepiness Scale :Total score: 2       PHYSICAL EXAMINATION:  CONSTITUTIONAL:  Vitals:    06/10/24 1500   BP: 139/78   BP Location: Left arm   Patient Position: Sitting   Pulse: 77   SpO2: 99%   Weight: 133 kg (294 lb)   Height: 195.6 cm (77.01\")    Body mass index is 34.86 kg/m².   NOSE: nasal passages are clear, No deformities noted   RESP SYSTEM: Not in any respiratory distress, no chest deformities noted,   CARDIOVASULAR: No edema noted  NEURO: Oriented x 3, gait normal,  Mood and affect appeared appropriate      Data reviewed:  The Smart card downloaded on 6/10/2024 has been reviewed independently by me for compliance and discussed the data with the patient.   Compliance; 70%  More than 4 hr use, 70%  Average use " of the device 7 hours per night  Residual AHI: 1.8 /hr (goal < 5.0 /hr)  Mask type: Fullface mask  Device: ResMed  DME: [employer]      ASSESSMENT AND PLAN:  Obstructive sleep apnea ( G 47.33).  The symptoms of sleep apnea have improved with the device and the treatment.  Patient's compliance with the device is excellent for treatment of sleep apnea.  I have independently reviewed the smart card down load and discussed with the patient the download data and encouarged the patient to continue to use the device.The residual AHI is acceptable. The device is benefiting the patient and the device is medically necessary.  Without proper control of sleep apnea and good compliance there is a increased risk for hypertension, diabetes mellitus and nonrestorative sleep with hypersomnia which can increase risk for motor vehicle accidents.  Untreated sleep apnea is also a risk factor for development of atrial fibrillation, pulmonary hypertension, insulin resistance and stroke. The patient is also instructed to get the supplies from the [employer] and and change them on a regular basis.  A prescription for supplies has been sent to the [employer] company.  I have also discussed the good sleep hygiene habits and adequate amount of sleep needed for good health.  Obesity  1 with BMI is Body mass index is 34.86 kg/m².. I have discuss the relationship between the weight and sleep apnea. The benefit of weight loss in reducing severity of sleep apnea was discussed. Discussed diet and exercise with the patient to achieve ideal BMI.   History of atrial fibrillation on hypertension  Return in about 1 year (around 6/10/2025) for with smart card down load. . Patient's questions were answered.    6/10/2024  Luis Bacon MD  Sleep Medicine.  Medical Director,   [facility] sleep centers.

## 2024-07-24 ENCOUNTER — OFFICE VISIT (OUTPATIENT)
Dept: CARDIOLOGY | Facility: CLINIC | Age: 39
End: 2024-07-24
Payer: COMMERCIAL

## 2024-07-24 VITALS
WEIGHT: 285 LBS | BODY MASS INDEX: 33.65 KG/M2 | HEIGHT: 77 IN | SYSTOLIC BLOOD PRESSURE: 141 MMHG | HEART RATE: 83 BPM | DIASTOLIC BLOOD PRESSURE: 81 MMHG

## 2024-07-24 DIAGNOSIS — E78.5 HYPERLIPIDEMIA LDL GOAL <100: ICD-10-CM

## 2024-07-24 DIAGNOSIS — I10 ESSENTIAL HYPERTENSION: ICD-10-CM

## 2024-07-24 DIAGNOSIS — I48.0 PAROXYSMAL ATRIAL FIBRILLATION: Primary | ICD-10-CM

## 2024-07-24 PROCEDURE — 3077F SYST BP >= 140 MM HG: CPT | Performed by: INTERNAL MEDICINE

## 2024-07-24 PROCEDURE — 3079F DIAST BP 80-89 MM HG: CPT | Performed by: INTERNAL MEDICINE

## 2024-07-24 PROCEDURE — 99214 OFFICE O/P EST MOD 30 MIN: CPT | Performed by: INTERNAL MEDICINE

## 2024-07-24 PROCEDURE — 93000 ELECTROCARDIOGRAM COMPLETE: CPT | Performed by: INTERNAL MEDICINE

## 2024-07-24 NOTE — ASSESSMENT & PLAN NOTE
Patient maintain normal sinus rhythm continue with his flecainide 100 twice daily dosing for now as well as diltiazem 360 daily.  Patient is now on also Eliquis 5 twice daily for CVA prevention.

## 2024-07-24 NOTE — PROGRESS NOTES
"Chief Complaint  Follow-up and Hypertension    Subjective    Patient with 1 episode of possible atrial fibrillation symptomatically brief in duration otherwise has been symptomatically stable not been having any changes in his overall breathing ability  Past Medical History:   Diagnosis Date    Essential hypertension 12/01/2021    Hyperlipidemia LDL goal <100 12/01/2021    Paroxysmal atrial fibrillation 12/01/2021    Tinnitus     Xerosis of skin 08/17/2018         Current Outpatient Medications:     ammonium lactate (LAC-HYDRIN) 12 % lotion, Apply  topically to the appropriate area as directed 2 (Two) Times a Day., Disp: 225 g, Rfl: 11    dilTIAZem CD (CARDIZEM CD) 360 MG 24 hr capsule, Take 1 capsule by mouth Daily., Disp: 90 capsule, Rfl: 1    Eliquis 5 MG tablet tablet, TAKE ONE TABLET BY MOUTH TWICE DAILY, Disp: 180 tablet, Rfl: 3    flecainide (TAMBOCOR) 100 MG tablet, Take 1 tablet by mouth 2 (Two) Times a Day., Disp: 180 tablet, Rfl: 0    loteprednol (LOTEMAX) 0.5 % ophthalmic suspension, Administer 1 drop to both eyes Daily., Disp: , Rfl:     rosuvastatin (CRESTOR) 5 MG tablet, TAKE ONE TABLET BY MOUTH EVERY NIGHT, Disp: 90 tablet, Rfl: 3    There are no discontinued medications.  Allergies   Allergen Reactions    Metoprolol Tinnitus        Social History     Tobacco Use    Smoking status: Never     Passive exposure: Never    Smokeless tobacco: Never   Vaping Use    Vaping status: Never Used   Substance Use Topics    Alcohol use: Never    Drug use: Never       Family History   Problem Relation Age of Onset    Breast cancer Maternal Grandmother         50s    Cancer Paternal Grandmother     Cancer Paternal Grandfather     Heart failure Paternal Grandfather         Objective     /81   Pulse 83   Ht 195.6 cm (77.01\")   Wt 129 kg (285 lb)   BMI 33.79 kg/m²       Physical Exam    General Appearance:   no acute distress  Alert and oriented x3  HENT:   lips not cyanotic  Atraumatic  Neck:  No jvd " "  supple  Respiratory:  no respiratory distress  normal breath sounds  no rales  Cardiovascular:  Regular rate and rhythm  no S3, no S4   no murmur  no rub  Extremities  No cyanosis  lower extremity edema: none    Skin:   warm, dry  No rashes      Result Review :     proBNP   Date Value Ref Range Status   02/29/2024 201.7 0.0 - 450.0 pg/mL Final     CMP          2/1/2024    15:13 2/29/2024    11:20 3/1/2024    04:15   CMP   Glucose 87  110  105    BUN 10  8  11    Creatinine 0.90  0.91  0.82    EGFR 113.5  112.0  116.8    Sodium 140  140  138    Potassium 3.5  3.7  3.5    Chloride 101  100  100    Calcium 10.1  9.7  9.5    Total Protein 8.0  7.4     Albumin 4.7  4.4     Globulin 3.3  3.0     Total Bilirubin 1.4  1.2     Alkaline Phosphatase 77  74     AST (SGOT) 21  17     ALT (SGPT) 24  19     Albumin/Globulin Ratio 1.4  1.5     BUN/Creatinine Ratio 11.1  8.8  13.4    Anion Gap 15.2  10.9  14.1      CBC w/diff          2/1/2024    15:13 2/29/2024    11:20 3/1/2024    04:15   CBC w/Diff   WBC 7.71  6.52  8.97    RBC 5.70  5.48  5.37    Hemoglobin 17.4  16.7  16.3    Hematocrit 49.5  48.2  46.5    MCV 86.8  88.0  86.6    MCH 30.5  30.5  30.4    MCHC 35.2  34.6  35.1    RDW 12.6  12.2  12.0    Platelets 243  223  231    Neutrophil Rel %  60.5  63.7    Immature Granulocyte Rel %  0.2  0.1    Lymphocyte Rel %  31.3  28.2    Monocyte Rel %  6.6  7.0    Eosinophil Rel %  0.8  0.6    Basophil Rel %  0.6  0.4       Lab Results   Component Value Date    TSH 1.380 05/08/2023      Lab Results   Component Value Date    FREET4 1.1 02/11/2019      No results found for: \"DDIMERQUANT\"  Magnesium   Date Value Ref Range Status   03/01/2024 1.9 1.6 - 2.6 mg/dL Final      No results found for: \"DIGOXIN\"   Lab Results   Component Value Date    TROPONINT 9 02/29/2024           Lipid Panel          12/6/2023    15:25   Lipid Panel   Total Cholesterol 168    Triglycerides 131    HDL Cholesterol 38    VLDL Cholesterol 24    LDL Cholesterol  " 106    LDL/HDL Ratio 2.73      Lab Results   Component Value Date    POCTROP 0.00 01/15/2022     Results for orders placed during the hospital encounter of 05/02/22    Stress Test With Myocardial Perfusion Two Day    Interpretation Summary  · Left ventricular ejection fraction is normal. (Calculated EF = 54%).  · Myocardial perfusion imaging indicates a normal myocardial perfusion study with no evidence of ischemia.  · Mild 0.5 mm upsloping ST depressions nondiagnostic for ischemia  · Impressions are consistent with a low risk study.         ECG 12 Lead    Date/Time: 7/24/2024 2:52 PM  Performed by: Damien Castellano MD    Authorized by: Damien Castellano MD  Comparison: compared with previous ECG   Similar to previous ECG  Rhythm: sinus rhythm               CHADS-VASc Risk Assessment               1 Total Score    1 Hypertension    Criteria that do not apply:    CHF    Age >/= 75    DM    PRIOR STROKE/TIA/THROMBO    Vascular Disease    Age 65-74    Sex: Female             Diagnoses and all orders for this visit:    1. Paroxysmal atrial fibrillation (Primary)  Assessment & Plan:  Patient maintain normal sinus rhythm continue with his flecainide 100 twice daily dosing for now as well as diltiazem 360 daily.  Patient is now on also Eliquis 5 twice daily for CVA prevention.    Orders:  -     ECG 12 Lead    2. Essential hypertension  Assessment & Plan:  Blood pressure well-controlled on diltiazem 360 daily       3. Hyperlipidemia LDL goal <100            Follow Up     Return in about 6 months (around 1/24/2025) for Follow with Samia Veliz, EKG with F/U.          Patient was given instructions and counseling regarding his condition or for health maintenance advice. Please see specific information pulled into the AVS if appropriate.

## 2024-08-05 ENCOUNTER — TELEPHONE (OUTPATIENT)
Dept: CARDIOLOGY | Facility: CLINIC | Age: 39
End: 2024-08-05
Payer: COMMERCIAL

## 2024-08-05 RX ORDER — DILTIAZEM HYDROCHLORIDE 360 MG/1
360 CAPSULE, EXTENDED RELEASE ORAL
Qty: 90 CAPSULE | Refills: 3 | Status: SHIPPED | OUTPATIENT
Start: 2024-08-05

## 2024-08-05 NOTE — TELEPHONE ENCOUNTER
The [facility] received a fax that requires your attention. The document has been indexed to the patient’s chart for your review.      Reason for sending: EXTERNAL MEDICAL RECORD NOTIFICATION     Documents Description: DILTIAZEM REFILL-[facility]-8.4.24     Name of Sender: [facility]     Date Indexed: 8.4.24

## 2024-08-07 NOTE — PROGRESS NOTES
"Chief Complaint  Hypertension (Follow up ), Headache (Had a really bad headache for 3 days and tylenol was not covering it /), Nose Bleed (Had a couple of nose bleeds), and Chest Pain (Chest feels heavy at times when he eats something /Its hard to breath /He has talked to his cardiologist )    Subjective          Vance Lorenzo presents to [facility]  History of Present Illness  Patient reports having right sided HA including blurry vision as well. He reports this happened about 3-4 days ago. Patient stated he took tylenol and rested and then started feeling better shortly thereafter. Patient reports his Blood Pressure at the time was . Patient reports having more epistaxis recently as well.   Patient reports having reflux symptoms and more belching recently.   Patient also reports episodes of hematochezia. Pt had colonoscopy approx 1 year ago that was reassuring.     Current Outpatient Medications   Medication Instructions    ammonium lactate (LAC-HYDRIN) 12 % lotion Topical, 2 Times Daily    dilTIAZem CD (CARDIZEM CD) 360 mg, Oral, Every 24 Hours Scheduled    Eliquis 5 MG tablet tablet TAKE ONE TABLET BY MOUTH TWICE DAILY    famotidine (PEPCID) 40 mg, Oral, Daily    flecainide (TAMBOCOR) 100 mg, Oral, 2 Times Daily    losartan (COZAAR) 25 mg, Oral, Daily    loteprednol (LOTEMAX) 0.5 % ophthalmic suspension Administer 1 drop to both eyes Daily.    rosuvastatin (CRESTOR) 5 mg, Oral, Every Night at Bedtime       The following portions of the patient's history were reviewed and updated as appropriate: allergies, current medications, past family history, past medical history, past social history, past surgical history, and problem list.    Objective   Vital Signs:   /82   Pulse 64   Temp 97.4 °F (36.3 °C)   Ht 195.6 cm (77\")   Wt 127 kg (281 lb)   SpO2 98%   BMI 33.32 kg/m²     BP Readings from Last 3 Encounters:   08/09/24 164/82   07/24/24 " 141/81   06/10/24 139/78     Wt Readings from Last 3 Encounters:   08/09/24 127 kg (281 lb)   07/24/24 129 kg (285 lb)   06/10/24 133 kg (294 lb)         Physical Exam   Appearance: No acute distress, well-nourished  Head: normocephalic, atraumatic  Eyes: extraocular movements intact, no scleral icterus, no conjunctival injection  Ears, Nose, and Throat: external ears normal, nares patent, moist mucous membranes  Cardiovascular: regular rate and rhythm. no murmurs, rubs, or gallops. no edema  Respiratory: breathing comfortably, symmetric chest rise, clear to auscultation bilaterally. No wheezes, rales, or rhonchi.  Neuro: alert and oriented to time, place, and person. Normal gait  Psych: normal mood and affect     Result Review :   The following data was reviewed by: Onesimo Thayer Jr, MD on 08/09/2024:  Common labs          2/1/2024    15:13 2/29/2024    11:20 3/1/2024    04:15   Common Labs   Glucose 87  110  105    BUN 10  8  11    Creatinine 0.90  0.91  0.82    Sodium 140  140  138    Potassium 3.5  3.7  3.5    Chloride 101  100  100    Calcium 10.1  9.7  9.5    Albumin 4.7  4.4     Total Bilirubin 1.4  1.2     Alkaline Phosphatase 77  74     AST (SGOT) 21  17     ALT (SGPT) 24  19     WBC 7.71  6.52  8.97    Hemoglobin 17.4  16.7  16.3    Hematocrit 49.5  48.2  46.5    Platelets 243  223  231        Lab Results   Component Value Date    SARSANTIGEN Not Detected 03/04/2024    COVID19 Not Detected 03/04/2024    FLUAAG Not Detected 03/04/2024    FLUBAG Not Detected 03/04/2024    RAPSCRN Negative 03/04/2024    INR 1.20 (H) 02/01/2024    BILIRUBINUR Negative 12/06/2023          Assessment and Plan    Diagnoses and all orders for this visit:    1. Essential hypertension (Primary)  Comments:  Will start on ARB as previously tolerated.  Follow-up in 3 weeks.  Goal BP less than 130/80.  Orders:  -     CBC & Differential  -     Comprehensive Metabolic Panel  -     losartan (COZAAR) 25 MG tablet; Take 1 tablet by  mouth Daily.  Dispense: 90 tablet; Refill: 1    2. Hyperlipidemia LDL goal <100  -     Lipid Panel    3. Paroxysmal atrial fibrillation  Comments:  Rate controlled today.  Continue Eliquis.  Overview:  Failed flecainide 50 twice daily uptitrated on 2/24      4. Hyperglycemia  -     Hemoglobin A1c    5. Hematochezia  Comments:  As reported by patient.  Will refer to GI to consider C-scope.  Orders:  -     Ambulatory Referral to Gastroenterology    6. Gastroesophageal reflux disease without esophagitis  -     famotidine (PEPCID) 40 MG tablet; Take 1 tablet by mouth Daily.  Dispense: 90 tablet; Refill: 1          There are no discontinued medications.       Follow Up   Return in about 3 weeks (around 8/30/2024) for HTN.  Patient was given instructions and counseling regarding his condition or for health maintenance advice. Please see specific information pulled into the AVS if appropriate.       Onesimo Thayer Jr, MD  08/09/24  15:47 EDT

## 2024-08-09 ENCOUNTER — OFFICE VISIT (OUTPATIENT)
Dept: INTERNAL MEDICINE | Facility: CLINIC | Age: 39
End: 2024-08-09
Payer: COMMERCIAL

## 2024-08-09 VITALS
WEIGHT: 281 LBS | BODY MASS INDEX: 33.18 KG/M2 | DIASTOLIC BLOOD PRESSURE: 82 MMHG | HEIGHT: 77 IN | OXYGEN SATURATION: 98 % | TEMPERATURE: 97.4 F | HEART RATE: 64 BPM | SYSTOLIC BLOOD PRESSURE: 164 MMHG

## 2024-08-09 DIAGNOSIS — R73.9 HYPERGLYCEMIA: ICD-10-CM

## 2024-08-09 DIAGNOSIS — K21.9 GASTROESOPHAGEAL REFLUX DISEASE WITHOUT ESOPHAGITIS: ICD-10-CM

## 2024-08-09 DIAGNOSIS — I48.0 PAROXYSMAL ATRIAL FIBRILLATION: ICD-10-CM

## 2024-08-09 DIAGNOSIS — I10 ESSENTIAL HYPERTENSION: Primary | ICD-10-CM

## 2024-08-09 DIAGNOSIS — K92.1 HEMATOCHEZIA: ICD-10-CM

## 2024-08-09 DIAGNOSIS — E78.5 HYPERLIPIDEMIA LDL GOAL <100: ICD-10-CM

## 2024-08-09 LAB
ALBUMIN SERPL-MCNC: 4.5 G/DL (ref 3.5–5.2)
ALBUMIN/GLOB SERPL: 1.7 G/DL
ALP SERPL-CCNC: 71 U/L (ref 39–117)
ALT SERPL W P-5'-P-CCNC: 14 U/L (ref 1–41)
ANION GAP SERPL CALCULATED.3IONS-SCNC: 10.4 MMOL/L (ref 5–15)
AST SERPL-CCNC: 14 U/L (ref 1–40)
BASOPHILS # BLD AUTO: 0.03 10*3/MM3 (ref 0–0.2)
BASOPHILS NFR BLD AUTO: 0.5 % (ref 0–1.5)
BILIRUB SERPL-MCNC: 0.8 MG/DL (ref 0–1.2)
BUN SERPL-MCNC: 8 MG/DL (ref 6–20)
BUN/CREAT SERPL: 9.8 (ref 7–25)
CALCIUM SPEC-SCNC: 9.8 MG/DL (ref 8.6–10.5)
CHLORIDE SERPL-SCNC: 103 MMOL/L (ref 98–107)
CHOLEST SERPL-MCNC: 144 MG/DL (ref 0–200)
CO2 SERPL-SCNC: 27.6 MMOL/L (ref 22–29)
CREAT SERPL-MCNC: 0.82 MG/DL (ref 0.76–1.27)
DEPRECATED RDW RBC AUTO: 39.1 FL (ref 37–54)
EGFRCR SERPLBLD CKD-EPI 2021: 116 ML/MIN/1.73
EOSINOPHIL # BLD AUTO: 0.05 10*3/MM3 (ref 0–0.4)
EOSINOPHIL NFR BLD AUTO: 0.9 % (ref 0.3–6.2)
ERYTHROCYTE [DISTWIDTH] IN BLOOD BY AUTOMATED COUNT: 11.8 % (ref 12.3–15.4)
GLOBULIN UR ELPH-MCNC: 2.6 GM/DL
GLUCOSE SERPL-MCNC: 90 MG/DL (ref 65–99)
HBA1C MFR BLD: 5.2 % (ref 4.8–5.6)
HCT VFR BLD AUTO: 43.1 % (ref 37.5–51)
HDLC SERPL-MCNC: 39 MG/DL (ref 40–60)
HGB BLD-MCNC: 14.6 G/DL (ref 13–17.7)
IMM GRANULOCYTES # BLD AUTO: 0.01 10*3/MM3 (ref 0–0.05)
IMM GRANULOCYTES NFR BLD AUTO: 0.2 % (ref 0–0.5)
LDLC SERPL CALC-MCNC: 89 MG/DL (ref 0–100)
LDLC/HDLC SERPL: 2.27 {RATIO}
LYMPHOCYTES # BLD AUTO: 1.61 10*3/MM3 (ref 0.7–3.1)
LYMPHOCYTES NFR BLD AUTO: 28.3 % (ref 19.6–45.3)
MCH RBC QN AUTO: 30.8 PG (ref 26.6–33)
MCHC RBC AUTO-ENTMCNC: 33.9 G/DL (ref 31.5–35.7)
MCV RBC AUTO: 90.9 FL (ref 79–97)
MONOCYTES # BLD AUTO: 0.37 10*3/MM3 (ref 0.1–0.9)
MONOCYTES NFR BLD AUTO: 6.5 % (ref 5–12)
NEUTROPHILS NFR BLD AUTO: 3.62 10*3/MM3 (ref 1.7–7)
NEUTROPHILS NFR BLD AUTO: 63.6 % (ref 42.7–76)
PLATELET # BLD AUTO: 205 10*3/MM3 (ref 140–450)
PMV BLD AUTO: 13.9 FL (ref 6–12)
POTASSIUM SERPL-SCNC: 3.7 MMOL/L (ref 3.5–5.2)
PROT SERPL-MCNC: 7.1 G/DL (ref 6–8.5)
RBC # BLD AUTO: 4.74 10*6/MM3 (ref 4.14–5.8)
SODIUM SERPL-SCNC: 141 MMOL/L (ref 136–145)
TRIGL SERPL-MCNC: 83 MG/DL (ref 0–150)
VLDLC SERPL-MCNC: 16 MG/DL (ref 5–40)
WBC NRBC COR # BLD AUTO: 5.69 10*3/MM3 (ref 3.4–10.8)

## 2024-08-09 PROCEDURE — 1126F AMNT PAIN NOTED NONE PRSNT: CPT | Performed by: INTERNAL MEDICINE

## 2024-08-09 PROCEDURE — 80061 LIPID PANEL: CPT | Performed by: INTERNAL MEDICINE

## 2024-08-09 PROCEDURE — 83036 HEMOGLOBIN GLYCOSYLATED A1C: CPT | Performed by: INTERNAL MEDICINE

## 2024-08-09 PROCEDURE — 99214 OFFICE O/P EST MOD 30 MIN: CPT | Performed by: INTERNAL MEDICINE

## 2024-08-09 PROCEDURE — 80053 COMPREHEN METABOLIC PANEL: CPT | Performed by: INTERNAL MEDICINE

## 2024-08-09 PROCEDURE — 3077F SYST BP >= 140 MM HG: CPT | Performed by: INTERNAL MEDICINE

## 2024-08-09 PROCEDURE — 85025 COMPLETE CBC W/AUTO DIFF WBC: CPT | Performed by: INTERNAL MEDICINE

## 2024-08-09 PROCEDURE — 3079F DIAST BP 80-89 MM HG: CPT | Performed by: INTERNAL MEDICINE

## 2024-08-09 RX ORDER — FAMOTIDINE 40 MG/1
40 TABLET, FILM COATED ORAL DAILY
Qty: 90 TABLET | Refills: 1 | Status: SHIPPED | OUTPATIENT
Start: 2024-08-09

## 2024-08-09 RX ORDER — LOSARTAN POTASSIUM 25 MG/1
25 TABLET ORAL DAILY
Qty: 90 TABLET | Refills: 1 | Status: SHIPPED | OUTPATIENT
Start: 2024-08-09

## 2024-08-12 ENCOUNTER — TELEPHONE (OUTPATIENT)
Dept: INTERNAL MEDICINE | Facility: CLINIC | Age: 39
End: 2024-08-12
Payer: COMMERCIAL

## 2024-08-14 ENCOUNTER — TELEPHONE (OUTPATIENT)
Dept: GASTROENTEROLOGY | Facility: CLINIC | Age: 39
End: 2024-08-14
Payer: COMMERCIAL

## 2024-08-14 NOTE — TELEPHONE ENCOUNTER
Called patient no answer left Vm     OKAY FOR HUB TO READ/ADVISE     He could take the losartan by itself and see if it is effective enough for him.  I did not think he was on the hydrochlorothiazide at the time of the appointment either

## 2024-08-14 NOTE — TELEPHONE ENCOUNTER
He could take the losartan by itself and see if it is effective enough for him.  I did not think he was on the hydrochlorothiazide at the time of the appointment either.

## 2024-08-19 NOTE — TELEPHONE ENCOUNTER
Left message for patient to return call to clinic.    HUB TO READ:  He could take the losartan by itself and see if it is effective enough for him. I did not think he was on the hydrochlorothiazide at the time of the appointment either

## 2024-08-26 ENCOUNTER — OFFICE VISIT (OUTPATIENT)
Dept: INTERNAL MEDICINE | Facility: CLINIC | Age: 39
End: 2024-08-26
Payer: COMMERCIAL

## 2024-08-26 VITALS
DIASTOLIC BLOOD PRESSURE: 78 MMHG | WEIGHT: 282.2 LBS | HEIGHT: 77 IN | OXYGEN SATURATION: 96 % | TEMPERATURE: 97.4 F | HEART RATE: 70 BPM | BODY MASS INDEX: 33.32 KG/M2 | SYSTOLIC BLOOD PRESSURE: 136 MMHG

## 2024-08-26 DIAGNOSIS — I10 ESSENTIAL HYPERTENSION: Primary | ICD-10-CM

## 2024-08-26 DIAGNOSIS — R51.9 INTRACTABLE EPISODIC HEADACHE, UNSPECIFIED HEADACHE TYPE: ICD-10-CM

## 2024-08-26 PROCEDURE — 1126F AMNT PAIN NOTED NONE PRSNT: CPT | Performed by: INTERNAL MEDICINE

## 2024-08-26 PROCEDURE — 3075F SYST BP GE 130 - 139MM HG: CPT | Performed by: INTERNAL MEDICINE

## 2024-08-26 PROCEDURE — 3078F DIAST BP <80 MM HG: CPT | Performed by: INTERNAL MEDICINE

## 2024-08-26 PROCEDURE — 99214 OFFICE O/P EST MOD 30 MIN: CPT | Performed by: INTERNAL MEDICINE

## 2024-08-26 RX ORDER — HYDROCHLOROTHIAZIDE 12.5 MG/1
12.5 TABLET ORAL DAILY
COMMUNITY
End: 2024-08-26 | Stop reason: ALTCHOICE

## 2024-08-26 RX ORDER — HYDROCHLOROTHIAZIDE 25 MG/1
25 TABLET ORAL DAILY
Qty: 90 TABLET | Refills: 1 | Status: SHIPPED | OUTPATIENT
Start: 2024-08-26

## 2024-08-26 RX ORDER — ONDANSETRON 4 MG/1
4 TABLET, FILM COATED ORAL EVERY 8 HOURS PRN
Qty: 30 TABLET | Refills: 1 | Status: SHIPPED | OUTPATIENT
Start: 2024-08-26

## 2024-08-26 NOTE — PROGRESS NOTES
"Chief Complaint  Hypertension (Follow up ), Chest Pain (Patient stopped mediation due to it making him have chest pain /After stopping the medication the chest pain stopped ), Back Pain, Tinnitus (Ear ringing left ), and Headache    Subjective          Vance Lorenzo presents to [facility]  History of Present Illness  Pretension-patient started on losartan, but reports it caused him to have chest pain.  Patient reports chest pain improved once he quit taking the medication.  Patient reports ongoing headaches. Patient reports home Blood Pressure readings 120s/70s.   Patient reports having episodes of epistaxis. Patient does report having intermittent headaches. Patient will take tylenol and rest to help with his headaches. Patient avoids NSAIDs with anticoagulant. Patient has nausea with headaches. Patient also reports some intermittent blurry vision.     Current Outpatient Medications   Medication Instructions    ammonium lactate (LAC-HYDRIN) 12 % lotion Topical, 2 Times Daily    dilTIAZem CD (CARDIZEM CD) 360 mg, Oral, Every 24 Hours Scheduled    Eliquis 5 MG tablet tablet TAKE ONE TABLET BY MOUTH TWICE DAILY    famotidine (PEPCID) 40 mg, Oral, Daily    flecainide (TAMBOCOR) 100 mg, Oral, 2 Times Daily    hydroCHLOROthiazide 25 mg, Oral, Daily    loteprednol (LOTEMAX) 0.5 % ophthalmic suspension Administer 1 drop to both eyes Daily.    ondansetron (ZOFRAN) 4 mg, Oral, Every 8 Hours PRN    rosuvastatin (CRESTOR) 5 mg, Oral, Every Night at Bedtime       The following portions of the patient's history were reviewed and updated as appropriate: allergies, current medications, past family history, past medical history, past social history, past surgical history, and problem list.    Objective   Vital Signs:   /78 (BP Location: Right arm)   Pulse 70   Temp 97.4 °F (36.3 °C) (Temporal)   Ht 195.6 cm (77\")   Wt 128 kg (282 lb 3.2 oz)   SpO2 96%   BMI 33.46 " kg/m²     BP Readings from Last 3 Encounters:   08/26/24 136/78   08/09/24 164/82   07/24/24 141/81     Wt Readings from Last 3 Encounters:   08/26/24 128 kg (282 lb 3.2 oz)   08/09/24 127 kg (281 lb)   07/24/24 129 kg (285 lb)        Physical Exam   Appearance: No acute distress, well-nourished  Head: normocephalic, atraumatic  Eyes: extraocular movements intact, no scleral icterus, no conjunctival injection  Ears, Nose, and Throat: external ears normal, nares patent, moist mucous membranes  Cardiovascular: regular rate and rhythm. no murmurs, rubs, or gallops. no edema  Respiratory: breathing comfortably, symmetric chest rise, clear to auscultation bilaterally. No wheezes, rales, or rhonchi.  Neuro: alert and oriented to time, place, and person. Normal gait  Psych: normal mood and affect     Result Review :   The following data was reviewed by: Onesimo Thayer Jr, MD on 08/26/2024:  Common labs          2/29/2024    11:20 3/1/2024    04:15 8/9/2024    13:37   Common Labs   Glucose 110  105  90    BUN 8  11  8    Creatinine 0.91  0.82  0.82    Sodium 140  138  141    Potassium 3.7  3.5  3.7    Chloride 100  100  103    Calcium 9.7  9.5  9.8    Albumin 4.4   4.5    Total Bilirubin 1.2   0.8    Alkaline Phosphatase 74   71    AST (SGOT) 17   14    ALT (SGPT) 19   14    WBC 6.52  8.97  5.69    Hemoglobin 16.7  16.3  14.6    Hematocrit 48.2  46.5  43.1    Platelets 223  231  205    Total Cholesterol   144    Triglycerides   83    HDL Cholesterol   39    LDL Cholesterol    89    Hemoglobin A1C   5.20        Lab Results   Component Value Date    SARSANTIGEN Not Detected 03/04/2024    COVID19 Not Detected 03/04/2024    FLUAAG Not Detected 03/04/2024    FLUBAG Not Detected 03/04/2024    RAPSCRN Negative 03/04/2024    INR 1.20 (H) 02/01/2024    BILIRUBINUR Negative 12/06/2023          Assessment and Plan    Diagnoses and all orders for this visit:    1. Essential hypertension (Primary)  Comments:  cont on HCTZ 25mg.  stop losartan due to side effects. monitor BP at home BP<130/80  Orders:  -     hydroCHLOROthiazide 25 MG tablet; Take 1 tablet by mouth Daily.  Dispense: 90 tablet; Refill: 1    2. Intractable episodic headache, unspecified headache type  Comments:  brain MRI reviewed and neg about 1 year prior. discussed HA management. zofran prn. refer to neuro for further eval.  Orders:  -     ondansetron (Zofran) 4 MG tablet; Take 1 tablet by mouth Every 8 (Eight) Hours As Needed for Nausea or Vomiting.  Dispense: 30 tablet; Refill: 1  -     Ambulatory Referral to Neurology        Medications Discontinued During This Encounter   Medication Reason    hydroCHLOROthiazide 12.5 MG tablet Alternate therapy    losartan (COZAAR) 25 MG tablet *Therapy completed        Follow Up   Return in about 4 weeks (around 9/23/2024) for HTN.  Patient was given instructions and counseling regarding his condition or for health maintenance advice. Please see specific information pulled into the AVS if appropriate.       Onesimo Thayer Jr, MD  08/26/24  15:45 EDT

## 2024-09-06 ENCOUNTER — PREP FOR SURGERY (OUTPATIENT)
Dept: OTHER | Facility: HOSPITAL | Age: 39
End: 2024-09-06
Payer: COMMERCIAL

## 2024-09-06 ENCOUNTER — CLINICAL SUPPORT (OUTPATIENT)
Dept: GASTROENTEROLOGY | Facility: CLINIC | Age: 39
End: 2024-09-06
Payer: COMMERCIAL

## 2024-09-06 ENCOUNTER — TELEPHONE (OUTPATIENT)
Dept: GASTROENTEROLOGY | Facility: CLINIC | Age: 39
End: 2024-09-06
Payer: COMMERCIAL

## 2024-09-06 DIAGNOSIS — Z12.11 COLON CANCER SCREENING: Primary | ICD-10-CM

## 2024-09-06 DIAGNOSIS — K62.5 RECTAL BLEEDING: ICD-10-CM

## 2024-09-06 RX ORDER — SODIUM PICOSULFATE, MAGNESIUM OXIDE, AND ANHYDROUS CITRIC ACID 12; 3.5; 1 G/175ML; G/175ML; MG/175ML
175 LIQUID ORAL TAKE AS DIRECTED
Qty: 350 ML | Refills: 0 | Status: SHIPPED | OUTPATIENT
Start: 2024-09-06

## 2024-09-06 NOTE — PROGRESS NOTES
Vance Lorenzo  1985  39 y.o.    Reason for call: Rectal Bleeding- REFERRED BY DR. KATH Flanagan prescribed: Clenpiq  Prep instructions reviewed with patient and sent to patient via [id number]  Is the patient currently on any injectable or oral medications for weight loss or diabetes? No  Clearance needed? Yes  If yes, what clearance is needed? Blood thinner and Cardiology  Clearance has been requested from DR. NEWBY  The patient has been scheduled for: Colonoscopy  After your procedure, you will be contacted with results. Please confirm the best phone # to reach the patient: [phone]  Family history of colon cancer? No  If yes, indicate relative: N/A  Tentative Procedure Date: 11/13/2024    Family History   Problem Relation Age of Onset    Breast cancer Maternal Grandmother         50s    Cancer Paternal Grandmother     Cancer Paternal Grandfather     Heart failure Paternal Grandfather     Colon cancer Neg Hx      Past Medical History:   Diagnosis Date    Essential hypertension 12/01/2021    Hyperlipidemia LDL goal <100 12/01/2021    Paroxysmal atrial fibrillation 12/01/2021    Tinnitus     Xerosis of skin 08/17/2018     Allergies   Allergen Reactions    Metoprolol Tinnitus     Past Surgical History:   Procedure Laterality Date    COLONOSCOPY       Social History     Socioeconomic History    Marital status: Single   Tobacco Use    Smoking status: Never     Passive exposure: Never    Smokeless tobacco: Never   Vaping Use    Vaping status: Never Used   Substance and Sexual Activity    Alcohol use: Never    Drug use: Never    Sexual activity: Defer       Current Outpatient Medications:     dilTIAZem CD (CARDIZEM CD) 360 MG 24 hr capsule, Take 1 capsule by mouth Daily., Disp: 90 capsule, Rfl: 3    Eliquis 5 MG tablet tablet, TAKE ONE TABLET BY MOUTH TWICE DAILY, Disp: 180 tablet, Rfl: 3    famotidine (PEPCID) 40 MG tablet, Take 1 tablet by mouth Daily., Disp: 90 tablet, Rfl: 1    flecainide (TAMBOCOR) 100 MG  tablet, Take 1 tablet by mouth 2 (Two) Times a Day., Disp: 180 tablet, Rfl: 0    hydroCHLOROthiazide 25 MG tablet, Take 1 tablet by mouth Daily., Disp: 90 tablet, Rfl: 1    loteprednol (LOTEMAX) 0.5 % ophthalmic suspension, Administer 1 drop to both eyes Daily., Disp: , Rfl:     rosuvastatin (CRESTOR) 5 MG tablet, TAKE ONE TABLET BY MOUTH EVERY NIGHT, Disp: 90 tablet, Rfl: 3    ammonium lactate (LAC-HYDRIN) 12 % lotion, Apply  topically to the appropriate area as directed 2 (Two) Times a Day., Disp: 225 g, Rfl: 11    ondansetron (Zofran) 4 MG tablet, Take 1 tablet by mouth Every 8 (Eight) Hours As Needed for Nausea or Vomiting. (Patient not taking: Reported on 9/6/2024), Disp: 30 tablet, Rfl: 1

## 2024-10-01 RX ORDER — APIXABAN 5 MG/1
TABLET, FILM COATED ORAL
Qty: 180 TABLET | Refills: 3 | Status: SHIPPED | OUTPATIENT
Start: 2024-10-01

## 2024-10-17 NOTE — TELEPHONE ENCOUNTER
Caller: SATURNINO BANKS    Relationship: Mother    Best call back number: [phone]     Requested Prescriptions:   Requested Prescriptions     Pending Prescriptions Disp Refills    flecainide (TAMBOCOR) 100 MG tablet 180 tablet 0     Sig: Take 1 tablet by mouth 2 (Two) Times a Day.    dilTIAZem CD (CARDIZEM CD) 360 MG 24 hr capsule 90 capsule 3     Sig: Take 1 capsule by mouth Daily.        Pharmacy where request should be sent: [facility] [address] - [phone]  - [phone]     Last office visit with prescribing clinician: 7/24/2024   Last telemedicine visit with prescribing clinician: Visit date not found   Next office visit with prescribing clinician: Visit date not found     Additional details provided by patient: 90 DAY SUPPLY PLEASE.  PATIENT GOT PUT ON THESE MEDICATIONS IN THE HOSPITAL AND IS NEEDING REFILLS OF THESE. THANK YOU!    Does the patient have less than a 3 day supply:  [x] Yes  [] No    Would you like a call back once the refill request has been completed: [] Yes [] No    If the office needs to give you a call back, can they leave a voicemail: [] Yes [] No    Odilon Mccarthy Rep   10/17/24 09:54 EDT

## 2024-10-18 RX ORDER — FLECAINIDE ACETATE 100 MG/1
100 TABLET ORAL 2 TIMES DAILY
Qty: 180 TABLET | Refills: 3 | Status: SHIPPED | OUTPATIENT
Start: 2024-10-18

## 2024-10-18 RX ORDER — DILTIAZEM HYDROCHLORIDE 360 MG/1
360 CAPSULE, EXTENDED RELEASE ORAL
Qty: 90 CAPSULE | Refills: 3 | Status: SHIPPED | OUTPATIENT
Start: 2024-10-18

## 2024-10-29 ENCOUNTER — TELEPHONE (OUTPATIENT)
Dept: GASTROENTEROLOGY | Facility: CLINIC | Age: 39
End: 2024-10-29

## 2024-10-29 ENCOUNTER — TELEPHONE (OUTPATIENT)
Dept: GASTROENTEROLOGY | Facility: CLINIC | Age: 39
End: 2024-10-29
Payer: COMMERCIAL

## 2024-10-29 NOTE — TELEPHONE ENCOUNTER
Hub staff attempted to follow warm transfer process and was unsuccessful     Caller: Vance Lorenzo    Relationship to patient: Self    Best call back number: [phone]    Patient is needing: CLARIFICATION ON IF HE NEEDS TO WITHHOLD ANY OTHER MEDICATION OTHER THAN HIS ELIQUIS

## 2024-10-29 NOTE — TELEPHONE ENCOUNTER
Spoke with the patient and informed him there is no other medication that needs a hold, besides his Eliquis. Informed him that PAT will be contacting him within a week, to go over his information again. Patient expressed clear understanding.

## 2024-10-29 NOTE — TELEPHONE ENCOUNTER
Attempted to contact the patient in regards to his medication questions, LVM for the patient to contact our office.

## 2024-10-29 NOTE — TELEPHONE ENCOUNTER
Hub staff attempted to follow warm transfer process and was unsuccessful     Caller: Vance Lorenzo    Relationship to patient: Self    Best call back number:  [phone]    Patient is needing:  PT IS RETURNING CALL TO OFFICE REGARDING MEDICATION AND PROCEDURE. PLEASE REACH BACK OUT TO PT OK TO LEAVE A DETAILED MESSAGE ON VM.

## 2024-11-04 NOTE — PRE-PROCEDURE INSTRUCTIONS
Left message:   Reminded of arrival time at  0730, Entrance C of the [facility] hospital. Instructed to bring or have a  over the age of 18 set up to drive you home the day of procedure.    Instructed on clear liquid diet the day before, nothing red or purple. Call with any questions about the prep or if in need of the prep.  Reminded them not to eat or drink anything am of procedure unless its a sip of water with medications. Hold eliquis 2 days prior to procedure. Hold HCTZ am of procedure.

## 2024-11-11 ENCOUNTER — ANESTHESIA EVENT (OUTPATIENT)
Dept: GASTROENTEROLOGY | Facility: HOSPITAL | Age: 39
End: 2024-11-11
Payer: COMMERCIAL

## 2024-11-11 NOTE — ANESTHESIA PREPROCEDURE EVALUATION
Anesthesia Evaluation     Patient summary reviewed and Nursing notes reviewed   NPO Solid Status: > 8 hours  NPO Liquid Status: > 4 hours           Airway   Mallampati: I  TM distance: <3 FB  Neck ROM: full  No difficulty expected and Large neck circumference  Dental - normal exam     Pulmonary - normal exam    breath sounds clear to auscultation  (+) ,sleep apnea on CPAP  Cardiovascular   Exercise tolerance: good (4-7 METS)    ECG reviewed  PT is on anticoagulation therapy  Rhythm: regular  Rate: normal    (+) hypertension well controlled, dysrhythmias Paroxysmal Atrial Fib, murmur, hyperlipidemia      Neuro/Psych  (+) dizziness/light headedness (hx tinnitus)  GI/Hepatic/Renal/Endo    (+) obesity, GI bleeding resolved    Musculoskeletal     Abdominal    Substance History      OB/GYN          Other        ROS/Med Hx Other: Last dose Eliquis  11/10    EKG 07/25/24: HR 75, SR,  prolonged TX interval     Stress test 05/02/22:   ·Left ventricular ejection fraction is normal. (Calculated EF = 54%).  ·Myocardial perfusion imaging indicates a normal myocardial perfusion study with no evidence of ischemia.  ·Mild 0.5 mm upsloping ST depressions nondiagnostic for ischemia  ·Impressions are consistent with a low risk study.    Cards clearance received on 09/19/24 with acceptable risks per Dr. Castellano                   Anesthesia Plan    ASA 3     general   total IV anesthesia  (Total IV Anesthesia    Patient understands anesthesia not responsible for dental damage.  )  intravenous induction     Anesthetic plan, risks, benefits, and alternatives have been provided, discussed and informed consent has been obtained with: patient and sibling.  Pre-procedure education provided  Plan discussed with CRNA.      CODE STATUS:

## 2024-11-13 ENCOUNTER — ANESTHESIA (OUTPATIENT)
Dept: GASTROENTEROLOGY | Facility: HOSPITAL | Age: 39
End: 2024-11-13
Payer: COMMERCIAL

## 2024-11-13 ENCOUNTER — HOSPITAL ENCOUNTER (OUTPATIENT)
Facility: HOSPITAL | Age: 39
Setting detail: HOSPITAL OUTPATIENT SURGERY
Discharge: HOME | End: 2024-11-13
Attending: INTERNAL MEDICINE | Admitting: INTERNAL MEDICINE
Payer: COMMERCIAL

## 2024-11-13 VITALS
RESPIRATION RATE: 24 BRPM | SYSTOLIC BLOOD PRESSURE: 128 MMHG | BODY MASS INDEX: 33.72 KG/M2 | HEART RATE: 81 BPM | TEMPERATURE: 97.1 F | WEIGHT: 284.39 LBS | OXYGEN SATURATION: 97 % | DIASTOLIC BLOOD PRESSURE: 90 MMHG

## 2024-11-13 DIAGNOSIS — K62.5 RECTAL BLEEDING: ICD-10-CM

## 2024-11-13 DIAGNOSIS — Z12.11 COLON CANCER SCREENING: ICD-10-CM

## 2024-11-13 PROCEDURE — 25010000002 PROPOFOL 10 MG/ML EMULSION: Performed by: MARRIAGE & FAMILY THERAPIST

## 2024-11-13 PROCEDURE — 45385 COLONOSCOPY W/LESION REMOVAL: CPT | Performed by: INTERNAL MEDICINE

## 2024-11-13 PROCEDURE — 88305 TISSUE EXAM BY PATHOLOGIST: CPT | Performed by: INTERNAL MEDICINE

## 2024-11-13 PROCEDURE — 25010000002 LIDOCAINE PF 2% 2 % SOLUTION: Performed by: MARRIAGE & FAMILY THERAPIST

## 2024-11-13 RX ORDER — SODIUM CHLORIDE, SODIUM LACTATE, POTASSIUM CHLORIDE, CALCIUM CHLORIDE 600; 310; 30; 20 MG/100ML; MG/100ML; MG/100ML; MG/100ML
30 INJECTION, SOLUTION INTRAVENOUS CONTINUOUS
Status: DISCONTINUED | OUTPATIENT
Start: 2024-11-13 | End: 2024-11-13 | Stop reason: HOSPADM

## 2024-11-13 RX ORDER — PROPOFOL 10 MG/ML
VIAL (ML) INTRAVENOUS AS NEEDED
Status: DISCONTINUED | OUTPATIENT
Start: 2024-11-13 | End: 2024-11-13 | Stop reason: SURG

## 2024-11-13 RX ORDER — LIDOCAINE HYDROCHLORIDE 20 MG/ML
INJECTION, SOLUTION EPIDURAL; INFILTRATION; INTRACAUDAL; PERINEURAL AS NEEDED
Status: DISCONTINUED | OUTPATIENT
Start: 2024-11-13 | End: 2024-11-13 | Stop reason: SURG

## 2024-11-13 RX ADMIN — LIDOCAINE HYDROCHLORIDE 50 MG: 20 INJECTION, SOLUTION INTRAVENOUS at 09:11

## 2024-11-13 RX ADMIN — PROPOFOL 125 MCG/KG/MIN: 10 INJECTION, EMULSION INTRAVENOUS at 09:12

## 2024-11-13 RX ADMIN — PROPOFOL 100 MG: 10 INJECTION, EMULSION INTRAVENOUS at 09:11

## 2024-11-13 NOTE — ANESTHESIA POSTPROCEDURE EVALUATION
Patient: Vance Lorenzo    Procedure Summary       Date: 11/13/24 Room / Location: [facility] ENDOSCOPY 2 / [facility] ENDOSCOPY    Anesthesia Start: 0911 Anesthesia Stop: 0934    Procedure: COLONOSCOPY WITH POLYPECTOMY, COLD SNARE Diagnosis:       Colon cancer screening      Rectal bleeding      (Colon cancer screening [Z12.11])      (Rectal bleeding [K62.5])    Surgeons: David Shultz MD Provider: Praneeth Gaines CRNA    Anesthesia Type: general ASA Status: 3            Anesthesia Type: general    Vitals  Vitals Value Taken Time   /90 11/13/24 0950   Temp 36.2 °C (97.1 °F) 11/13/24 0950   Pulse 81 11/13/24 0950   Resp 24 11/13/24 0950   SpO2 97 % 11/13/24 0950           Post Anesthesia Care and Evaluation    Post-procedure mental status: acceptable.  Pain management: satisfactory to patient    Airway patency: patent  Anesthetic complications: No anesthetic complications    Cardiovascular status: acceptable  Respiratory status: acceptable    Comments: Per chart review

## 2024-11-13 NOTE — H&P
ScreeningPre Procedure History & Physical    Chief Complaint:   Screening     Subjective     HPI:   Screening     Past Medical History:   Past Medical History:   Diagnosis Date    Essential hypertension 12/01/2021    Hyperlipidemia LDL goal <100 12/01/2021    Paroxysmal atrial fibrillation 12/01/2021    Sleep apnea     Tinnitus     Xerosis of skin 08/17/2018       Past Surgical History:  Past Surgical History:   Procedure Laterality Date    COLONOSCOPY         Family History:  Family History   Problem Relation Age of Onset    Breast cancer Maternal Grandmother         50s    Cancer Paternal Grandmother     Cancer Paternal Grandfather     Heart failure Paternal Grandfather     Colon cancer Neg Hx        Social History:   reports that he has never smoked. He has never been exposed to tobacco smoke. He has never used smokeless tobacco. He reports that he does not drink alcohol and does not use drugs.    Medications:   Medications Prior to Admission   Medication Sig Dispense Refill Last Dose/Taking    ammonium lactate (LAC-HYDRIN) 12 % lotion Apply  topically to the appropriate area as directed 2 (Two) Times a Day. 225 g 11 Past Week    dilTIAZem CD (CARDIZEM CD) 360 MG 24 hr capsule Take 1 capsule by mouth Daily. 90 capsule 3 11/12/2024    flecainide (TAMBOCOR) 100 MG tablet Take 1 tablet by mouth 2 (Two) Times a Day. 180 tablet 3 11/12/2024    loteprednol (LOTEMAX) 0.5 % ophthalmic suspension Administer 1 drop to both eyes Daily.   Past Week    rosuvastatin (CRESTOR) 5 MG tablet TAKE ONE TABLET BY MOUTH EVERY NIGHT 90 tablet 3 11/12/2024    Eliquis 5 MG tablet tablet TAKE ONE TABLET BY MOUTH TWICE DAILY 180 tablet 3 11/10/2024    famotidine (PEPCID) 40 MG tablet Take 1 tablet by mouth Daily. 90 tablet 1 More than a month    hydroCHLOROthiazide 25 MG tablet Take 1 tablet by mouth Daily. 90 tablet 1 More than a month       Allergies:  Metoprolol        Objective     Blood pressure 157/96, pulse 88, temperature 97.6 °F  (36.4 °C), temperature source Temporal, resp. rate 18, weight 129 kg (284 lb 6.3 oz), SpO2 97%.    Physical Exam   Constitutional: Pt is oriented to person, place, and time and well-developed, well-nourished, and in no distress.   Mouth/Throat: Oropharynx is clear and moist.   Neck: Normal range of motion.   Cardiovascular: Normal rate, regular rhythm and normal heart sounds.    Pulmonary/Chest: Effort normal and breath sounds normal.   Abdominal: Soft. Nontender  Skin: Skin is warm and dry.   Psychiatric: Mood, memory, affect and judgment normal.     Assessment & Plan     Diagnosis:  Screening colonoscopy  H/o colon polyps     Anticipated Surgical Procedure:  colonoscopy    The risks, benefits, and alternatives of this procedure have been discussed with the patient or the responsible party- the patient understands and agrees to proceed.             Yes

## 2024-11-15 PROCEDURE — 87086 URINE CULTURE/COLONY COUNT: CPT | Performed by: NURSE PRACTITIONER

## 2024-11-15 PROCEDURE — 87186 SC STD MICRODIL/AGAR DIL: CPT | Performed by: NURSE PRACTITIONER

## 2024-11-15 PROCEDURE — 87077 CULTURE AEROBIC IDENTIFY: CPT | Performed by: NURSE PRACTITIONER

## 2024-11-18 ENCOUNTER — HOSPITAL ENCOUNTER (EMERGENCY)
Facility: HOSPITAL | Age: 39
Discharge: HOME | End: 2024-11-18
Attending: EMERGENCY MEDICINE | Admitting: EMERGENCY MEDICINE
Payer: COMMERCIAL

## 2024-11-18 ENCOUNTER — TELEPHONE (OUTPATIENT)
Dept: URGENT CARE | Facility: CLINIC | Age: 39
End: 2024-11-18
Payer: COMMERCIAL

## 2024-11-18 ENCOUNTER — APPOINTMENT (OUTPATIENT)
Dept: CT IMAGING | Facility: HOSPITAL | Age: 39
End: 2024-11-18
Payer: COMMERCIAL

## 2024-11-18 VITALS
WEIGHT: 286.82 LBS | BODY MASS INDEX: 33.87 KG/M2 | TEMPERATURE: 100.1 F | DIASTOLIC BLOOD PRESSURE: 94 MMHG | HEIGHT: 77 IN | RESPIRATION RATE: 18 BRPM | HEART RATE: 90 BPM | OXYGEN SATURATION: 98 % | SYSTOLIC BLOOD PRESSURE: 150 MMHG

## 2024-11-18 DIAGNOSIS — N30.01 ACUTE CYSTITIS WITH HEMATURIA: Primary | ICD-10-CM

## 2024-11-18 LAB
ALBUMIN SERPL-MCNC: 4.4 G/DL (ref 3.5–5.2)
ALBUMIN/GLOB SERPL: 1.4 G/DL
ALP SERPL-CCNC: 76 U/L (ref 39–117)
ALT SERPL W P-5'-P-CCNC: 12 U/L (ref 1–41)
ANION GAP SERPL CALCULATED.3IONS-SCNC: 11.8 MMOL/L (ref 5–15)
AST SERPL-CCNC: 14 U/L (ref 1–40)
BACTERIA UR QL AUTO: ABNORMAL /HPF
BASOPHILS # BLD AUTO: 0.01 10*3/MM3 (ref 0–0.2)
BASOPHILS NFR BLD AUTO: 0.2 % (ref 0–1.5)
BILIRUB SERPL-MCNC: 0.7 MG/DL (ref 0–1.2)
BILIRUB UR QL STRIP: NEGATIVE
BUN SERPL-MCNC: 5 MG/DL (ref 6–20)
BUN/CREAT SERPL: 5.3 (ref 7–25)
CALCIUM SPEC-SCNC: 9.3 MG/DL (ref 8.6–10.5)
CHLORIDE SERPL-SCNC: 100 MMOL/L (ref 98–107)
CLARITY UR: CLEAR
CO2 SERPL-SCNC: 26.2 MMOL/L (ref 22–29)
COLOR UR: YELLOW
CREAT SERPL-MCNC: 0.94 MG/DL (ref 0.76–1.27)
D-LACTATE SERPL-SCNC: 0.9 MMOL/L (ref 0.5–2)
DEPRECATED RDW RBC AUTO: 39 FL (ref 37–54)
EGFRCR SERPLBLD CKD-EPI 2021: 107.1 ML/MIN/1.73
EOSINOPHIL # BLD AUTO: 0 10*3/MM3 (ref 0–0.4)
EOSINOPHIL NFR BLD AUTO: 0 % (ref 0.3–6.2)
ERYTHROCYTE [DISTWIDTH] IN BLOOD BY AUTOMATED COUNT: 12.2 % (ref 12.3–15.4)
GLOBULIN UR ELPH-MCNC: 3.2 GM/DL
GLUCOSE SERPL-MCNC: 97 MG/DL (ref 65–99)
GLUCOSE UR STRIP-MCNC: NEGATIVE MG/DL
HCT VFR BLD AUTO: 42.6 % (ref 37.5–51)
HGB BLD-MCNC: 15.3 G/DL (ref 13–17.7)
HGB UR QL STRIP.AUTO: NEGATIVE
HOLD SPECIMEN: NORMAL
HOLD SPECIMEN: NORMAL
HYALINE CASTS UR QL AUTO: ABNORMAL /LPF
IMM GRANULOCYTES # BLD AUTO: 0.01 10*3/MM3 (ref 0–0.05)
IMM GRANULOCYTES NFR BLD AUTO: 0.2 % (ref 0–0.5)
KETONES UR QL STRIP: NEGATIVE
LEUKOCYTE ESTERASE UR QL STRIP.AUTO: ABNORMAL
LIPASE SERPL-CCNC: 26 U/L (ref 13–60)
LYMPHOCYTES # BLD AUTO: 1.02 10*3/MM3 (ref 0.7–3.1)
LYMPHOCYTES NFR BLD AUTO: 20.1 % (ref 19.6–45.3)
MCH RBC QN AUTO: 31.4 PG (ref 26.6–33)
MCHC RBC AUTO-ENTMCNC: 35.9 G/DL (ref 31.5–35.7)
MCV RBC AUTO: 87.3 FL (ref 79–97)
MONOCYTES # BLD AUTO: 0.62 10*3/MM3 (ref 0.1–0.9)
MONOCYTES NFR BLD AUTO: 12.2 % (ref 5–12)
NEUTROPHILS NFR BLD AUTO: 3.42 10*3/MM3 (ref 1.7–7)
NEUTROPHILS NFR BLD AUTO: 67.3 % (ref 42.7–76)
NITRITE UR QL STRIP: NEGATIVE
NRBC BLD AUTO-RTO: 0 /100 WBC (ref 0–0.2)
PH UR STRIP.AUTO: 7 [PH] (ref 5–8)
PLATELET # BLD AUTO: 212 10*3/MM3 (ref 140–450)
PMV BLD AUTO: 10.7 FL (ref 6–12)
POTASSIUM SERPL-SCNC: 3.5 MMOL/L (ref 3.5–5.2)
PROT SERPL-MCNC: 7.6 G/DL (ref 6–8.5)
PROT UR QL STRIP: NEGATIVE
RBC # BLD AUTO: 4.88 10*6/MM3 (ref 4.14–5.8)
RBC # UR STRIP: ABNORMAL /HPF
REF LAB TEST METHOD: ABNORMAL
SODIUM SERPL-SCNC: 138 MMOL/L (ref 136–145)
SP GR UR STRIP: <=1.005 (ref 1–1.03)
SQUAMOUS #/AREA URNS HPF: ABNORMAL /HPF
UROBILINOGEN UR QL STRIP: ABNORMAL
WBC # UR STRIP: ABNORMAL /HPF
WBC NRBC COR # BLD AUTO: 5.08 10*3/MM3 (ref 3.4–10.8)
WHOLE BLOOD HOLD COAG: NORMAL
WHOLE BLOOD HOLD SPECIMEN: NORMAL

## 2024-11-18 PROCEDURE — 83690 ASSAY OF LIPASE: CPT

## 2024-11-18 PROCEDURE — 85025 COMPLETE CBC W/AUTO DIFF WBC: CPT

## 2024-11-18 PROCEDURE — 25510000001 IOPAMIDOL PER 1 ML: Performed by: EMERGENCY MEDICINE

## 2024-11-18 PROCEDURE — 83605 ASSAY OF LACTIC ACID: CPT

## 2024-11-18 PROCEDURE — 80053 COMPREHEN METABOLIC PANEL: CPT

## 2024-11-18 PROCEDURE — 36415 COLL VENOUS BLD VENIPUNCTURE: CPT

## 2024-11-18 PROCEDURE — 99285 EMERGENCY DEPT VISIT HI MDM: CPT

## 2024-11-18 PROCEDURE — 74177 CT ABD & PELVIS W/CONTRAST: CPT

## 2024-11-18 PROCEDURE — 81001 URINALYSIS AUTO W/SCOPE: CPT

## 2024-11-18 RX ORDER — SODIUM CHLORIDE 0.9 % (FLUSH) 0.9 %
10 SYRINGE (ML) INJECTION AS NEEDED
Status: DISCONTINUED | OUTPATIENT
Start: 2024-11-18 | End: 2024-11-18 | Stop reason: HOSPADM

## 2024-11-18 RX ORDER — IOPAMIDOL 755 MG/ML
100 INJECTION, SOLUTION INTRAVASCULAR
Status: COMPLETED | OUTPATIENT
Start: 2024-11-18 | End: 2024-11-18

## 2024-11-18 RX ADMIN — IOPAMIDOL 90 ML: 755 INJECTION, SOLUTION INTRAVENOUS at 17:57

## 2024-11-18 NOTE — DISCHARGE INSTRUCTIONS
You were evaluated in the emergency department today.  I reviewed your urgent care results and your urine culture did grow out Klebsiella.  Cefdinir does treat Klebsiella, I recommend you start taking this immediately.  It was sent by the urgent care doctor and is ready to be picked up.  Alternate Tylenol and ibuprofen for pain.  Return to the emergency department if you have any worsening symptoms.  Please follow-up with your primary care provider for repeat urine within 5 days.

## 2024-11-18 NOTE — ED PROVIDER NOTES
Time: 2:48 PM EST  Date of encounter:  11/18/2024  Independent Historian/Clinical History and Information was obtained by:   Patient    History is limited by: N/A    Chief Complaint   Patient presents with    Flank Pain    Blood in Urine         History of Present Illness:  Patient is a 39 y.o. year old male who presents to the emergency department for evaluation of flank pain.  Patient states that it began Wednesday following his colonoscopy.  Patient was seen by his provider on Friday who diagnosed him with a urinary tract infection and started him on Bactrim.  Patient has been on Bactrim for 3 days now without any relief and worsening flank pain.  Patient complains of chills.(Bailey Seaver, APRN, FNP-C)    Arsenio Greene PA-C: I continued care of this patient.  I agree with the above.  Patient reports that the pain began moving to his right flank area and then progressed to the left flank area as well.  Reports he had difficulty with urination which prompted him to see his primary care provider at that time.  Reports he is now urinating normally, pain has subsided.  Reports he has history of A-fib and feels like he was placed into A-fib with the Bactrim so he contacted the provider who prescribed the Bactrim.  It was changed to cefdinir at that time and then on review of his urine culture, grew out Klebsiella so cefdinir was seen as susceptible.  Patient denies penile discharge, dysuria, hematuria, flank pain, nausea or vomiting.    Patient Care Team  Primary Care Provider: Onesimo Thayer Jr., MD    Past Medical History:     Allergies   Allergen Reactions    Metoprolol Tinnitus     Past Medical History:   Diagnosis Date    Essential hypertension 12/01/2021    Hyperlipidemia LDL goal <100 12/01/2021    Paroxysmal atrial fibrillation 12/01/2021    Sleep apnea     Tinnitus     Xerosis of skin 08/17/2018     Past Surgical History:   Procedure Laterality Date    COLONOSCOPY      COLONOSCOPY N/A 11/13/2024     Procedure: COLONOSCOPY WITH POLYPECTOMY, COLD SNARE;  Surgeon: David Shultz MD;  Location: [facility];  Service: Gastroenterology;  Laterality: N/A;  COLON POLYP     Family History   Problem Relation Age of Onset    Breast cancer Maternal Grandmother         50s    Cancer Paternal Grandmother     Cancer Paternal Grandfather     Heart failure Paternal Grandfather     Colon cancer Neg Hx        Home Medications:  Prior to Admission medications    Medication Sig Start Date End Date Taking? Authorizing Provider   ammonium lactate (LAC-HYDRIN) 12 % lotion Apply  topically to the appropriate area as directed 2 (Two) Times a Day. 4/8/24   Suman Amaya DPM   cefdinir (OMNICEF) 300 MG capsule Take 1 capsule by mouth 2 (Two) Times a Day for 7 days. 11/18/24 11/25/24  Boy Meléndez MD   dilTIAZem CD (CARDIZEM CD) 360 MG 24 hr capsule Take 1 capsule by mouth Daily. 10/18/24   Samia Veliz APRN   Eliquis 5 MG tablet tablet TAKE ONE TABLET BY MOUTH TWICE DAILY 10/1/24   Damien Castellano MD   famotidine (PEPCID) 40 MG tablet Take 1 tablet by mouth Daily. 8/9/24   Onesimo Thayer Jr., MD   flecainide (TAMBOCOR) 100 MG tablet Take 1 tablet by mouth 2 (Two) Times a Day. 10/18/24   Samia Veliz APRN   hydroCHLOROthiazide 25 MG tablet Take 1 tablet by mouth Daily. 8/26/24   Onesimo Thayer Jr., MD   loteprednol (LOTEMAX) 0.5 % ophthalmic suspension Administer 1 drop to both eyes Daily. 5/18/22   Roe Francois MD   rosuvastatin (CRESTOR) 5 MG tablet TAKE ONE TABLET BY MOUTH EVERY NIGHT 5/10/24   Onesimo Thayer Jr., MD   sulfamethoxazole-trimethoprim (BACTRIM DS,SEPTRA DS) 800-160 MG per tablet Take 1 tablet by mouth 2 (Two) Times a Day for 10 days. 11/15/24 11/18/24  Edita Haywood APRN        Social History:   Social History     Tobacco Use    Smoking status: Never     Passive exposure: Never    Smokeless tobacco: Never   Vaping Use    Vaping  "status: Never Used   Substance Use Topics    Alcohol use: Never    Drug use: Never         Review of Systems:  Review of Systems   Constitutional:  Positive for chills. Negative for fatigue and fever.   HENT:  Negative for congestion, ear discharge and facial swelling.    Respiratory:  Negative for cough, shortness of breath and wheezing.    Cardiovascular:  Negative for chest pain, palpitations and leg swelling.   Gastrointestinal:  Negative for abdominal distention, abdominal pain, constipation, diarrhea and vomiting.   Genitourinary:  Positive for difficulty urinating and flank pain. Negative for decreased urine volume, hematuria, penile discharge, penile pain, penile swelling, scrotal swelling, testicular pain and urgency.   Musculoskeletal:  Positive for back pain. Negative for neck pain and neck stiffness.   Skin:  Negative for color change and rash.   Neurological:  Negative for dizziness and headaches.   Hematological:  Negative for adenopathy. Bruises/bleeds easily.   Psychiatric/Behavioral:  Negative for agitation and confusion.         Physical Exam:  /94 (BP Location: Right arm, Patient Position: Sitting)   Pulse 90   Temp 100.1 °F (37.8 °C) (Oral)   Resp 18   Ht 195.6 cm (77\")   Wt 130 kg (286 lb 13.1 oz)   SpO2 98%   BMI 34.01 kg/m²         Physical Exam  Vitals and nursing note reviewed.   Constitutional:       General: He is not in acute distress.     Appearance: Normal appearance. He is not ill-appearing, toxic-appearing or diaphoretic.   HENT:      Head: Normocephalic and atraumatic.      Right Ear: External ear normal.      Left Ear: External ear normal.      Nose: Nose normal.      Mouth/Throat:      Mouth: Mucous membranes are moist.      Pharynx: Oropharynx is clear.   Eyes:      Extraocular Movements: Extraocular movements intact.      Conjunctiva/sclera: Conjunctivae normal.      Pupils: Pupils are equal, round, and reactive to light.   Cardiovascular:      Rate and Rhythm: " Normal rate and regular rhythm.      Pulses: Normal pulses.      Heart sounds: Normal heart sounds. No murmur heard.     No friction rub. No gallop.   Pulmonary:      Effort: Pulmonary effort is normal. No respiratory distress.      Breath sounds: Normal breath sounds. No stridor. No wheezing, rhonchi or rales.   Chest:      Chest wall: No tenderness.   Abdominal:      General: Abdomen is flat. There is no distension.      Palpations: Abdomen is soft.      Tenderness: There is no abdominal tenderness. There is no right CVA tenderness, left CVA tenderness or guarding.   Musculoskeletal:      Cervical back: Normal range of motion and neck supple.      Comments: Nontender T-spine and L-spine palpation.   Skin:     General: Skin is warm and dry.      Coloration: Skin is not cyanotic.      Findings: No erythema or rash.      Comments: No rash or erythema noted to back/flanks   Neurological:      General: No focal deficit present.      Mental Status: He is alert and oriented to person, place, and time.      Cranial Nerves: No cranial nerve deficit.   Psychiatric:         Attention and Perception: Attention and perception normal.         Mood and Affect: Mood normal.                      Procedures:  Procedures      Medical Decision Making:      Comorbidities that affect care:    Atrial Fibrillation    External Notes reviewed:    Previous Clinic Note: Urgent care note from 11/15/2024.  Patient seen at urgent care for urinary frequency.  Urine culture was reviewed, greater than 100,000 CFU per mL Klebsiella aerogenes.  Patient was initially sent Bactrim which was susceptible.  On review, is also susceptible to cefdinir.      The following orders were placed and all results were independently analyzed by me:  Orders Placed This Encounter   Procedures    CT Abdomen Pelvis With Contrast    [place] Draw    Comprehensive Metabolic Panel    Lipase    Urinalysis With Microscopic If Indicated (No Culture) - Urine, Clean Catch     CBC Auto Differential    Lactic Acid, Plasma    Urinalysis, Microscopic Only - Urine, Clean Catch    Undress & Gown    CBC & Differential    Green Top (Gel)    Lavender Top    Gold Top - SST    Light Blue Top       Medications Given in the Emergency Department:  Medications   iopamidol (ISOVUE-370) 76 % injection 100 mL (90 mL Intravenous Given 11/18/24 1757)        ED Course:    The patient was initially evaluated in the triage area where orders were placed. The patient was later dispositioned by Selma Greene PA-C.      The patient was advised to stay for completion of workup which includes but is not limited to communication of labs and radiological results, reassessment and plan. The patient was advised that leaving prior to disposition by a provider could result in critical findings that are not communicated to the patient.     ED Course as of 11/18/24 2115 Mon Nov 18, 2024   1449 PROVIDER IN TRIAGE  Patient was evaluated by me in triage, Bailey Seaver, APRN, FNP-C.  Orders were placed and patient is currently awaiting final results and disposition. [AS]   1708 CBC & Differential(!)  The patient´s CBC that was reviewed and interpreted by me shows no abnormalities of critical concern. Of note, there is no anemia requiring a blood transfusion and the platelet count is acceptable. [AS-2]   1708 Lactic Acid, Plasma  WNL [AS-2]   1708 Lipase  WNL [AS-2]   1709 Comprehensive Metabolic Panel(!)  The patient´s CMP that was reviewed and interpretted by me shows no abnormalities of critical concern. Of note, the patient´s sodium and potassium are acceptable. The patient´s liver enzymes are unremarkable. The patient´s renal function (creatinine) is preserved. The patient has a normal anion gap. [AS-2]   1709 Urinalysis With Microscopic If Indicated (No Culture) - Urine, Clean Catch(!)  No obvious UTI, has been on Bactrim [AS-2]   1832 CT Abdomen Pelvis With Contrast  No acute abnormality noted [AS-2]   1832 I  reviewed urine culture patient had performed on 11/15.  Growth of Klebsiella which is susceptible to cefdinir which was prescribed to him. [AS-2]      ED Course User Index  [AS] Seaver, Alyce B, APRN  [AS-2] Selma Greene, GEORGINA       Labs:    Lab Results (last 24 hours)       Procedure Component Value Units Date/Time    CBC & Differential [[id number]]  (Abnormal) Collected: 11/18/24 1451    Specimen: Blood from Arm, Right Updated: 11/18/24 1459    Narrative:      The following orders were created for panel order CBC & Differential.  Procedure                               Abnormality         Status                     ---------                               -----------         ------                     CBC Auto Differential[[id number]]        Abnormal            Final result                 Please view results for these tests on the individual orders.    Comprehensive Metabolic Panel [[id number]]  (Abnormal) Collected: 11/18/24 1451    Specimen: Blood from Arm, Right Updated: 11/18/24 1517     Glucose 97 mg/dL      BUN 5 mg/dL      Creatinine 0.94 mg/dL      Sodium 138 mmol/L      Potassium 3.5 mmol/L      Chloride 100 mmol/L      CO2 26.2 mmol/L      Calcium 9.3 mg/dL      Total Protein 7.6 g/dL      Albumin 4.4 g/dL      ALT (SGPT) 12 U/L      AST (SGOT) 14 U/L      Alkaline Phosphatase 76 U/L      Total Bilirubin 0.7 mg/dL      Globulin 3.2 gm/dL      A/G Ratio 1.4 g/dL      BUN/Creatinine Ratio 5.3     Anion Gap 11.8 mmol/L      eGFR 107.1 mL/min/1.73     Narrative:      GFR Normal >60  Chronic Kidney Disease <60  Kidney Failure <15      Lipase [[id number]]  (Normal) Collected: 11/18/24 1451    Specimen: Blood from Arm, Right Updated: 11/18/24 1517     Lipase 26 U/L     CBC Auto Differential [[id number]]  (Abnormal) Collected: 11/18/24 1451    Specimen: Blood from Arm, Right Updated: 11/18/24 1459     WBC 5.08 10*3/mm3      RBC 4.88 10*6/mm3      Hemoglobin 15.3 g/dL      Hematocrit 42.6 %      MCV 87.3 fL       MCH 31.4 pg      MCHC 35.9 g/dL      RDW 12.2 %      RDW-SD 39.0 fl      MPV 10.7 fL      Platelets 212 10*3/mm3      Neutrophil % 67.3 %      Lymphocyte % 20.1 %      Monocyte % 12.2 %      Eosinophil % 0.0 %      Basophil % 0.2 %      Immature Grans % 0.2 %      Neutrophils, Absolute 3.42 10*3/mm3      Lymphocytes, Absolute 1.02 10*3/mm3      Monocytes, Absolute 0.62 10*3/mm3      Eosinophils, Absolute 0.00 10*3/mm3      Basophils, Absolute 0.01 10*3/mm3      Immature Grans, Absolute 0.01 10*3/mm3      nRBC 0.0 /100 WBC     Lactic Acid, Plasma [[id number]]  (Normal) Collected: 11/18/24 1451    Specimen: Blood from Arm, Right Updated: 11/18/24 1513     Lactate 0.9 mmol/L     Urinalysis With Microscopic If Indicated (No Culture) - Urine, Clean Catch [[id number]]  (Abnormal) Collected: 11/18/24 1510    Specimen: Urine, Clean Catch Updated: 11/18/24 1545     Color, UA Yellow     Appearance, UA Clear     pH, UA 7.0     Specific Gravity, UA <=1.005     Glucose, UA Negative     Ketones, UA Negative     Bilirubin, UA Negative     Blood, UA Negative     Protein, UA Negative     Leuk Esterase, UA Trace     Nitrite, UA Negative     Urobilinogen, UA 1.0 E.U./dL    Urinalysis, Microscopic Only - Urine, Clean Catch [[id number]]  (Abnormal) Collected: 11/18/24 1510    Specimen: Urine, Clean Catch Updated: 11/18/24 1608     RBC, UA None Seen /HPF      WBC, UA 3-5 /HPF      Bacteria, UA None Seen /HPF      Squamous Epithelial Cells, UA 0-2 /HPF      Hyaline Casts, UA None Seen /LPF      Methodology Manual Light Microscopy             Imaging:    CT Abdomen Pelvis With Contrast    Result Date: 11/18/2024  CT ABDOMEN PELVIS W CONTRAST Date of Exam: 11/18/2024 5:56 PM EST Indication: flank pain. Comparison: 12/26/2022 Technique: Axial CT images were obtained of the abdomen and pelvis after the uneventful intravenous administration of iodinated contrast. Reconstructed coronal and sagittal images were also obtained. Automated exposure  control and iterative construction methods were used. Findings: Lower thorax:Lung bases are clear. No coronary artery calcifications seen in the visualized heart. No pericardial effusion.  No evidence of pulmonary embolus in the visualized pulmonary arteries. Liver: No focal hepatic lesions seen.  Normal hepatic size. Normal density of the liver. Gallbladder and bile ducts: Normal, nondistended appearance of the gallbladder.  No intra- or extra- hepatic biliary ductal dilatation. Spleen: Normal appearance of the spleen. Pancreas: Normal appearance of the pancreas. Main pancreatic duct is nondilated. Adrenals: Normal appearance of the adrenal glands. Kidneys: Right renal cyst. Symmetric enhancement. No nephrolithiasis.  Normal caliber of the ureters. Bowel: Normal caliber of the bowels. Normal appearance of the appendix. No substantial diverticular disease. Pelvis: Limited evaluation of partially distended bladder. Normal appearance of the prostate. Seminal vesicles appear normal. Peritoneum: No free air. No free fluid. No peritoneal nodularity. Vessels: Normal aortic caliber.  No atherosclerotic calcification of the aorta. Celiac artery, splenic artery, superior mesenteric artery, inferior mesenteric artery, renal arteries and iliac arteries appear patent. Iliac veins, inferior vena cava, superior mesenteric vein, renal veins, portal vein and splenic vein are patent. Lymph nodes: No enlarged or suspicious adenopathy. Bones: No acute osseous abnormality. Degenerative changes of the spine. L5 pars defects. Soft tissues: Unremarkable appearance of the soft tissues.     Impression: No acute intra-abdominal abnormality. Electronically Signed: César Martinez MD  11/18/2024 6:27 PM EST  Workstation ID: [id number]       Differential Diagnosis and Discussion:      Flank Pain: Differential diagnosis includes but is not limited to kidney stones, pyelonephritis, musculoskeletal disorders, renal infarction, urinary tract  infection, hydronephrosis, radiculopathy, aortic aneurysm, renal cell carcinoma.    All labs were reviewed and interpreted by me.  CT scan radiology impression was interpreted by me.    MDM     Amount and/or Complexity of Data Reviewed  Clinical lab tests: reviewed and ordered  Tests in the radiology section of CPT®: ordered and reviewed    Risk of Complications, Morbidity, and/or Mortality  Presenting problems: moderate  Diagnostic procedures: moderate  Management options: low    Patient Progress  Patient progress: stable                     Patient Care Considerations:    SEPSIS was considered but is NOT present in the emergency department as SIRS criteria is not present.      Consultants/Shared Management Plan:    None    Social Determinants of Health:    Patient is independent, reliable, and has access to care.       Disposition and Care Coordination:    Discharged: The patient is suitable and stable for discharge with no need for consideration of admission.    I have explained the patient´s condition, diagnoses and treatment plan based on the information available to me at this time. I have answered questions and addressed any concerns. The patient has a good  understanding of the patient´s diagnosis, condition, and treatment plan as can be expected at this point. The vital signs have been stable. The patient´s condition is stable and appropriate for discharge from the emergency department.      The patient will pursue further outpatient evaluation with the primary care physician or other designated or consulting physician as outlined in the discharge instructions. They are agreeable to this plan of care and follow-up instructions have been explained in detail. The patient has received these instructions in written format and has expressed an understanding of the discharge instructions. The patient is aware that any significant change in condition or worsening of symptoms should prompt an immediate return to  this or the closest emergency department or call to 911.  I have explained discharge medications and the need for follow up with the patient/caretakers. This was also printed in the discharge instructions. Patient was discharged with the following medications and follow up:      Medication List      No changes were made to your prescriptions during this visit.      Onesimo Thayer Jr., MD  [address] [facility] [address]  [place] [address]  [phone]    In 1 week         Final diagnoses:   Acute cystitis with hematuria        ED Disposition       ED Disposition   Discharge    Condition   Stable    Comment   --               This medical record created using voice recognition software.             Selma Greene PA-C  11/18/24 5223

## 2024-11-18 NOTE — TELEPHONE ENCOUNTER
Patient tells me that since start Bactrim he started to have some palpitations.  Has history of A-fib.  He also is having some abdominal pain last night on the right side but states this pain is completely gone now and he is pain-free.  I was going changing Levaquin but it interferes with his medication so I went ahead and changed him to cefdinir.  I told him if this does not help he really has no other option outside IV antibiotics.  I did warn him that there is a chance that cefdinir sometimes can build up resistance with this type infection.  I told him since that we have this history of this Klebsiella and I have to put him on cefdinir I would recommend for him to follow-up with his primary care doctor 10 days and repeat a urine.  Make certain the infection is gone.  If he starts having pain in the right abdominal area again he is go to the ER we do a CT scan.  But since he is not having any pain at this moment I did not see any reason for him to proceed further but I explained to him if this continues he may have some infection that ascending up the ureter on that side or you may have a stone that trying to pass.  He voices understanding.

## 2024-11-18 NOTE — ED NOTES
Pt states colonoscopy last wed, mabel flank pain wed night through sun, denies pain today. Stopped bactrim today and will be starting ceftiner tonight for uti.

## 2024-11-26 ENCOUNTER — OFFICE VISIT (OUTPATIENT)
Dept: INTERNAL MEDICINE | Facility: CLINIC | Age: 39
End: 2024-11-26
Payer: COMMERCIAL

## 2024-11-26 VITALS
OXYGEN SATURATION: 98 % | TEMPERATURE: 97.6 F | BODY MASS INDEX: 33.46 KG/M2 | HEIGHT: 77 IN | WEIGHT: 283.4 LBS | HEART RATE: 87 BPM | DIASTOLIC BLOOD PRESSURE: 84 MMHG | SYSTOLIC BLOOD PRESSURE: 148 MMHG

## 2024-11-26 DIAGNOSIS — E78.5 HYPERLIPIDEMIA LDL GOAL <100: ICD-10-CM

## 2024-11-26 DIAGNOSIS — I48.0 PAROXYSMAL ATRIAL FIBRILLATION: ICD-10-CM

## 2024-11-26 DIAGNOSIS — F42.9 OBSESSIVE-COMPULSIVE DISORDER, UNSPECIFIED TYPE: ICD-10-CM

## 2024-11-26 DIAGNOSIS — I10 ESSENTIAL HYPERTENSION: ICD-10-CM

## 2024-11-26 DIAGNOSIS — R39.89 SUSPECTED UTI: ICD-10-CM

## 2024-11-26 DIAGNOSIS — Z00.00 ANNUAL PHYSICAL EXAM: Primary | ICD-10-CM

## 2024-11-26 PROCEDURE — 99395 PREV VISIT EST AGE 18-39: CPT | Performed by: INTERNAL MEDICINE

## 2024-11-26 PROCEDURE — 3079F DIAST BP 80-89 MM HG: CPT | Performed by: INTERNAL MEDICINE

## 2024-11-26 PROCEDURE — 3077F SYST BP >= 140 MM HG: CPT | Performed by: INTERNAL MEDICINE

## 2024-11-26 PROCEDURE — 2014F MENTAL STATUS ASSESS: CPT | Performed by: INTERNAL MEDICINE

## 2024-11-26 PROCEDURE — 1126F AMNT PAIN NOTED NONE PRSNT: CPT | Performed by: INTERNAL MEDICINE

## 2024-11-26 PROCEDURE — 87086 URINE CULTURE/COLONY COUNT: CPT | Performed by: INTERNAL MEDICINE

## 2024-11-26 NOTE — PROGRESS NOTES
"Chief Complaint  Hypertension (Follow up ), Urinary Tract Infection (He had a uti and he just wanted to recheck his urine ), and Annual Exam    Subjective          Vance Lorenzo presents to [facility]  History of Present Illness  Patient recently diagnosed with urinary tract infection. He was on bactrim and then switched antibiotics to cefdinir. Patient reports he still feels \"off.\" he report suprapubic pressure. Patient also reports urine is darker.   Hypertension- patient reports noncompliance with HCTZ. He reports nocturia when he does take HCTZ, but will take at dinner time.   Patient reports obsession with taking showers. It is drying out his skin. He reports feeling a \"need\" to take showers in several situations where he feels dirty such as after BM or playing with children or going to doctor's office.     Current Outpatient Medications   Medication Instructions    ammonium lactate (LAC-HYDRIN) 12 % lotion Topical, 2 Times Daily    dilTIAZem CD (CARDIZEM CD) 360 mg, Oral, Every 24 Hours Scheduled    Eliquis 5 MG tablet tablet TAKE ONE TABLET BY MOUTH TWICE DAILY    famotidine (PEPCID) 40 mg, Oral, Daily    flecainide (TAMBOCOR) 100 mg, Oral, 2 Times Daily    hydroCHLOROthiazide 25 mg, Oral, Daily    loteprednol (LOTEMAX) 0.5 % ophthalmic suspension Administer 1 drop to both eyes Daily.    rosuvastatin (CRESTOR) 5 mg, Oral, Every Night at Bedtime       The following portions of the patient's history were reviewed and updated as appropriate: allergies, current medications, past family history, past medical history, past social history, past surgical history, and problem list.    Objective   Vital Signs:   /84 (BP Location: Left arm, Patient Position: Sitting, Cuff Size: Adult)   Pulse 87   Temp 97.6 °F (36.4 °C) (Temporal)   Ht 195.6 cm (77\")   Wt 129 kg (283 lb 6.4 oz)   SpO2 98%   BMI 33.61 kg/m²     BP Readings from Last 3 Encounters: "   11/26/24 148/84   11/18/24 150/94   11/15/24 158/95     Wt Readings from Last 3 Encounters:   11/26/24 129 kg (283 lb 6.4 oz)   11/18/24 130 kg (286 lb 13.1 oz)   11/15/24 132 kg (290 lb 8 oz)       Physical Exam   Appearance: No acute distress, well-nourished  Head: normocephalic, atraumatic  Eyes: extraocular movements intact, no scleral icterus, no conjunctival injection  Ears, Nose, and Throat: external ears normal, nares patent, moist mucous membranes  Cardiovascular: regular rate and rhythm. no murmurs, rubs, or gallops. no edema  Respiratory: breathing comfortably, symmetric chest rise, clear to auscultation bilaterally. No wheezes, rales, or rhonchi.  Neuro: alert and oriented to time, place, and person. Normal gait  Psych: normal mood and affect     Result Review :   The following data was reviewed by: Onesimo Thayer Jr, MD on 11/26/2024:  Common labs          3/1/2024    04:15 8/9/2024    13:37 11/18/2024    14:51   Common Labs   Glucose 105  90  97    BUN 11  8  5    Creatinine 0.82  0.82  0.94    Sodium 138  141  138    Potassium 3.5  3.7  3.5    Chloride 100  103  100    Calcium 9.5  9.8  9.3    Albumin  4.5  4.4    Total Bilirubin  0.8  0.7    Alkaline Phosphatase  71  76    AST (SGOT)  14  14    ALT (SGPT)  14  12    WBC 8.97  5.69  5.08    Hemoglobin 16.3  14.6  15.3    Hematocrit 46.5  43.1  42.6    Platelets 231  205  212    Total Cholesterol  144     Triglycerides  83     HDL Cholesterol  39     LDL Cholesterol   89     Hemoglobin A1C  5.20         Lab Results   Component Value Date    SARSANTIGEN Not Detected 03/04/2024    COVID19 Not Detected 03/04/2024    FLUAAG Not Detected 03/04/2024    FLUBAG Not Detected 03/04/2024    RAPSCRN Negative 03/04/2024    INR 1.20 (H) 02/01/2024    BILIRUBINUR Negative 11/18/2024          Assessment and Plan    Diagnoses and all orders for this visit:    1. Annual physical exam (Primary)    2. Suspected UTI  Comments:  recheck UA and Cx.  Orders:  -      Urine Culture - Urine, Urine, Clean Catch    3. Essential hypertension  Comments:  encouraged compliance wit HCTZ in am.    4. Hyperlipidemia LDL goal <100  Comments:  cont statin. check labs at next appt    5. Paroxysmal atrial fibrillation  Comments:  rate controlled. cont eliquis.  Overview:  Failed flecainide 50 twice daily uptitrated on 2/24      6. Obsessive-compulsive disorder, unspecified type  Comments:  refer to psych for counseling.  Orders:  -     Ambulatory Referral to Psychiatry      Advised on diet, physical activity, etc      Medications Discontinued During This Encounter   Medication Reason    cefdinir (OMNICEF) 300 MG capsule *Therapy completed      Follow Up   Return in about 6 months (around 5/26/2025).  Patient was given instructions and counseling regarding his condition or for health maintenance advice. Please see specific information pulled into the AVS if appropriate.       Onesimo Thayer Jr, MD  11/26/24  15:22 EST

## 2024-11-27 LAB — BACTERIA SPEC AEROBE CULT: NO GROWTH

## 2025-01-10 ENCOUNTER — OFFICE VISIT (OUTPATIENT)
Dept: INTERNAL MEDICINE | Facility: CLINIC | Age: 40
End: 2025-01-10
Payer: COMMERCIAL

## 2025-01-10 VITALS
SYSTOLIC BLOOD PRESSURE: 144 MMHG | DIASTOLIC BLOOD PRESSURE: 90 MMHG | TEMPERATURE: 97.9 F | HEIGHT: 77 IN | BODY MASS INDEX: 32.28 KG/M2 | OXYGEN SATURATION: 97 % | WEIGHT: 273.4 LBS | HEART RATE: 74 BPM

## 2025-01-10 DIAGNOSIS — I10 ESSENTIAL HYPERTENSION: Primary | ICD-10-CM

## 2025-01-10 DIAGNOSIS — G43.419 INTRACTABLE HEMIPLEGIC MIGRAINE WITHOUT STATUS MIGRAINOSUS: ICD-10-CM

## 2025-01-10 PROCEDURE — 3077F SYST BP >= 140 MM HG: CPT | Performed by: INTERNAL MEDICINE

## 2025-01-10 PROCEDURE — 99214 OFFICE O/P EST MOD 30 MIN: CPT | Performed by: INTERNAL MEDICINE

## 2025-01-10 PROCEDURE — 1126F AMNT PAIN NOTED NONE PRSNT: CPT | Performed by: INTERNAL MEDICINE

## 2025-01-10 PROCEDURE — 3080F DIAST BP >= 90 MM HG: CPT | Performed by: INTERNAL MEDICINE

## 2025-01-10 RX ORDER — MAGNESIUM OXIDE 400 MG/1
400 TABLET ORAL DAILY
Qty: 90 TABLET | Refills: 3 | Status: SHIPPED | OUTPATIENT
Start: 2025-01-10

## 2025-01-10 NOTE — PROGRESS NOTES
"Chief Complaint  Headache (Getting bad headaches again ), Blurred Vision, and Tinnitus    Subjective          Vance Lorenzo presents to [facility]ED[facility]ATR[facility]  History of Present Illness  Patient reports headaches, blurry vision and tinnitus for 1 week. Head pain, tinnitus, and blurry vision typically occur on the right side. Patient denies paresthesias, urinary and stool incontinence. Of note, patient had brain MRI approx 1.5 years ago that was normal.   Patient reports having right lower side back pain since having colonoscopy. Patient also reports tenesmus and incomplete emptying. Patient takes metamucil intermittently. He also tries to increase fiber in his diet.     Current Outpatient Medications   Medication Instructions    ammonium lactate (LAC-HYDRIN) 12 % lotion Topical, 2 Times Daily    dilTIAZem CD (CARDIZEM CD) 360 mg, Oral, Every 24 Hours Scheduled    Eliquis 5 MG tablet tablet TAKE ONE TABLET BY MOUTH TWICE DAILY    famotidine (PEPCID) 40 mg, Oral, Daily    flecainide (TAMBOCOR) 100 mg, Oral, 2 Times Daily    hydroCHLOROthiazide 25 mg, Oral, Daily    loteprednol (LOTEMAX) 0.5 % ophthalmic suspension Administer 1 drop to both eyes Daily.    magnesium oxide (MAG-OX) 400 mg, Oral, Daily    rosuvastatin (CRESTOR) 5 mg, Oral, Every Night at Bedtime    ubrogepant (UBRELVY) 50 mg, Oral, Daily PRN       The following portions of the patient's history were reviewed and updated as appropriate: allergies, current medications, past family history, past medical history, past social history, past surgical history, and problem list.    Objective   Vital Signs:   /90 (BP Location: Left arm, Patient Position: Sitting, Cuff Size: Large Adult)   Pulse 74   Temp 97.9 °F (36.6 °C) (Temporal)   Ht 195.6 cm (77\")   Wt 124 kg (273 lb 6.4 oz)   SpO2 97%   BMI 32.42 kg/m²     BP Readings from Last 3 Encounters:   01/10/25 144/90   12/07/24 142/91   11/26/24 148/84     Wt " Readings from Last 3 Encounters:   01/10/25 124 kg (273 lb 6.4 oz)   12/07/24 128 kg (283 lb)   11/26/24 129 kg (283 lb 6.4 oz)         Physical Exam   Appearance: No acute distress, well-nourished  Head: normocephalic, atraumatic  Eyes: extraocular movements intact, no scleral icterus, no conjunctival injection  Ears, Nose, and Throat: external ears normal, nares patent, moist mucous membranes  Cardiovascular: regular rate and rhythm. no murmurs, rubs, or gallops. no edema  Respiratory: breathing comfortably, symmetric chest rise, clear to auscultation bilaterally. No wheezes, rales, or rhonchi.  Neuro: alert and oriented to time, place, and person. Normal gait  Psych: normal mood and affect       Result Review :   The following data was reviewed by: Onesimo Thayer Jr, MD on 01/10/2025:  Common labs          3/1/2024    04:15 8/9/2024    13:37 11/18/2024    14:51   Common Labs   Glucose 105  90  97    BUN 11  8  5    Creatinine 0.82  0.82  0.94    Sodium 138  141  138    Potassium 3.5  3.7  3.5    Chloride 100  103  100    Calcium 9.5  9.8  9.3    Albumin  4.5  4.4    Total Bilirubin  0.8  0.7    Alkaline Phosphatase  71  76    AST (SGOT)  14  14    ALT (SGPT)  14  12    WBC 8.97  5.69  5.08    Hemoglobin 16.3  14.6  15.3    Hematocrit 46.5  43.1  42.6    Platelets 231  205  212    Total Cholesterol  144     Triglycerides  83     HDL Cholesterol  39     LDL Cholesterol   89     Hemoglobin A1C  5.20         Lab Results   Component Value Date    SARSANTIGEN Not Detected 12/07/2024    COVID19 Not Detected 03/04/2024    RAPFLUA Negative 12/07/2024    RAPFLUB Negative 12/07/2024    FLUAAG Not Detected 03/04/2024    FLUBAG Not Detected 03/04/2024    RAPSCRN Negative 12/07/2024    INR 1.20 (H) 02/01/2024    BILIRUBINUR Negative 11/18/2024          Assessment and Plan    Diagnoses and all orders for this visit:    1. Essential hypertension (Primary)  Comments:  hope for improvement with magnesium. also used for  HAs.  Orders:  -     magnesium oxide (MAG-OX) 400 MG tablet; Take 1 tablet by mouth Daily.  Dispense: 90 tablet; Refill: 3    2. Intractable hemiplegic migraine without status migrainosus  Comments:  start magnesium for PPX and ubrelvy prn.  Orders:  -     magnesium oxide (MAG-OX) 400 MG tablet; Take 1 tablet by mouth Daily.  Dispense: 90 tablet; Refill: 3  -     ubrogepant (UBRELVY) 50 MG tablet; Take 1 tablet by mouth Daily As Needed (migraine).  Dispense: 4 tablet; Refill: 0          Medications Discontinued During This Encounter   Medication Reason    losartan (COZAAR) 25 MG tablet *Error          Follow Up   Return in about 3 months (around 4/10/2025) for HTN.  Patient was given instructions and counseling regarding his condition or for health maintenance advice. Please see specific information pulled into the AVS if appropriate.       Onesimo Thayer Jr, MD  01/26/25  12:57 EST

## 2025-01-17 ENCOUNTER — PATIENT MESSAGE (OUTPATIENT)
Dept: INTERNAL MEDICINE | Facility: CLINIC | Age: 40
End: 2025-01-17
Payer: COMMERCIAL

## 2025-01-17 DIAGNOSIS — H93.13 TINNITUS OF BOTH EARS: Primary | ICD-10-CM

## 2025-02-03 ENCOUNTER — PATIENT MESSAGE (OUTPATIENT)
Dept: INTERNAL MEDICINE | Facility: CLINIC | Age: 40
End: 2025-02-03
Payer: COMMERCIAL

## 2025-03-06 ENCOUNTER — PATIENT ROUNDING (BHMG ONLY) (OUTPATIENT)
Dept: NEUROLOGY | Facility: CLINIC | Age: 40
End: 2025-03-06
Payer: COMMERCIAL

## 2025-03-06 ENCOUNTER — OFFICE VISIT (OUTPATIENT)
Dept: NEUROLOGY | Facility: CLINIC | Age: 40
End: 2025-03-06
Payer: COMMERCIAL

## 2025-03-06 VITALS
SYSTOLIC BLOOD PRESSURE: 140 MMHG | HEART RATE: 67 BPM | BODY MASS INDEX: 30.67 KG/M2 | HEIGHT: 77 IN | WEIGHT: 259.8 LBS | DIASTOLIC BLOOD PRESSURE: 81 MMHG

## 2025-03-06 DIAGNOSIS — Z87.898 HISTORY OF HEADACHE: Primary | ICD-10-CM

## 2025-03-06 NOTE — PROGRESS NOTES
"Chief Complaint  Migraine    Subjective          Vance Lorenzo presents to [facility]  History of Present Illness    History of Present Illness  The patient is a 39-year-old male who presents to the office for an initial consult for headaches.    No recent episodes of headaches have been reported since the last consultation with his primary care physician. Referred to our clinic as a precautionary measure due to the severity of previous headaches, which were described as \"really bad\" and necessitated bed rest for the entire day. These headaches were unresponsive to Tylenol and had an abrupt onset. Predominantly localized on the right side, they extended from the temple area to the back of the head. No photophobia or phonophobia was experienced during these episodes. A pre-existing condition of blurry vision is mentioned, which he suspects may have been exacerbated by the headaches. The onset of these severe headaches was approximately 2 to 3 weeks prior to his last visit with his primary care physician in August 2024. Although there is a history of regular headaches, the intensity of these recent episodes was unprecedented.        MEDICATIONS  Tylenol       Objective   Vital Signs:   /81   Pulse 67   Ht 195.6 cm (77\")   Wt 118 kg (259 lb 12.8 oz)   BMI 30.81 kg/m²     Physical Exam  HENT:      Head: Normocephalic.   Pulmonary:      Effort: Pulmonary effort is normal.   Neurological:      Mental Status: He is alert and oriented to person, place, and time.      Sensory: Sensation is intact.      Motor: Motor function is intact.      Coordination: Coordination is intact.      Deep Tendon Reflexes: Reflexes are normal and symmetric.        Neurological Exam  Mental Status  Alert. Oriented to person, place, and time.    Sensory  Normal sensation.    Reflexes  Deep tendon reflexes are 2+ and symmetric in all four extremities.    Coordination    Finger-to-nose, " rapid alternating movements and heel-to-shin normal bilaterally without dysmetria.      Result Review :               Results for orders placed or performed during the hospital encounter of 05/16/23   MRI Brain With & Without Contrast    Narrative    PROCEDURE: MRI BRAIN W WO CONTRAST     COMPARISON: None     INDICATIONS: Neuro deficit, acute, stroke suspected     CONTRAST: 20 mL  Multihance I.V.     TECHNIQUE: A variety of imaging planes and parameters were utilized for visualization of suspected   pathology.  Images were performed without and with gadolinium contrast.     FINDINGS:   CEREBRUM: No edema, hemorrhage, mass, acute infarction, or inappropriate atrophy.    CEREBELLUM: No edema, hemorrhage, mass, acute infarction, or inappropriate atrophy.    BRAINSTEM: No edema, hemorrhage, mass, acute infarction, or inappropriate atrophy.    CSF SPACES: Ventricles, cisterns, and sulci are appropriate for age.  No hydrocephalus,   subarachnoid hemorrhage, or mass.    SKULL: No mass or other significant visible lesion.    SINUSES: Limited views demonstrate no significant mucosal thickening or fluid.    ORBITS: Limited views are unremarkable.    OTHER: No abnormal meningeal or parenchymal enhancement.  There is an incidental 1 cm cyst of the   superior aspect of the left parotid gland.       Impression     No acute disease.                 RONALD CORRALES MD         Electronically Signed and Approved By: RONALD CORRALES MD on 5/16/2023 at 15:28                        Assessment and Plan    There are no diagnoses linked to this encounter.    Assessment & Plan  1. Headaches.  The patient's headaches have shown significant improvement since the initiation of antihypertensive therapy. Given the absence of a prior history of headaches and the current resolution of symptoms, it is plausible that the elevated blood pressure was the precipitating factor. His blood pressure remains slightly elevated today, which could potentially  trigger an acute headache. However, his previous blood pressure reading in August 2024 was 164/82, suggesting that the current level may not be the sole cause of the headaches. It is also noteworthy that his MRI results are within normal limits. The duration of the headaches was approximately 2 to 3 weeks before they subsided. This pattern does not align with any known neurologic headache syndrome, which is encouraging as these conditions are typically chronic. The transient nature of the symptoms suggests that they could have been caused by a variety of factors, including high blood pressure, viral infections, or sinus issues. It is reassuring that the headaches have resolved and have not recurred. At this juncture, no pharmacological intervention is recommended. He is advised to monitor his symptoms and contact us if they recur, at which point we can reassess his condition.       I spent 30 minutes caring for Vance on this date of service. This time includes time spent by me in the following activities:preparing for the visit, reviewing tests, obtaining and/or reviewing a separately obtained history, performing a medically appropriate examination and/or evaluation , counseling and educating the patient/family/caregiver, ordering medications, tests, or procedures, documenting information in the medical record, and independently interpreting results and communicating that information with the patient/family/caregiver  Follow Up   Return if symptoms worsen or fail to improve.  Patient was given instructions and counseling regarding his condition or for health maintenance advice. Please see specific information pulled into the AVS if appropriate.       Patient or patient representative verbalized consent for the use of Ambient Listening during the visit with  MARSHA Mcrae for chart documentation. 3/7/2025  14:37 EST

## 2025-03-26 ENCOUNTER — OFFICE VISIT (OUTPATIENT)
Dept: CARDIOLOGY | Facility: CLINIC | Age: 40
End: 2025-03-26
Payer: COMMERCIAL

## 2025-03-26 VITALS
SYSTOLIC BLOOD PRESSURE: 128 MMHG | WEIGHT: 255.8 LBS | BODY MASS INDEX: 30.2 KG/M2 | DIASTOLIC BLOOD PRESSURE: 85 MMHG | HEIGHT: 77 IN | HEART RATE: 74 BPM

## 2025-03-26 DIAGNOSIS — I48.0 PAROXYSMAL ATRIAL FIBRILLATION: Primary | ICD-10-CM

## 2025-03-26 DIAGNOSIS — I10 ESSENTIAL HYPERTENSION: ICD-10-CM

## 2025-03-26 PROCEDURE — 3079F DIAST BP 80-89 MM HG: CPT | Performed by: NURSE PRACTITIONER

## 2025-03-26 PROCEDURE — 99214 OFFICE O/P EST MOD 30 MIN: CPT | Performed by: NURSE PRACTITIONER

## 2025-03-26 PROCEDURE — 1159F MED LIST DOCD IN RCRD: CPT | Performed by: NURSE PRACTITIONER

## 2025-03-26 PROCEDURE — 93000 ELECTROCARDIOGRAM COMPLETE: CPT | Performed by: NURSE PRACTITIONER

## 2025-03-26 PROCEDURE — 3074F SYST BP LT 130 MM HG: CPT | Performed by: NURSE PRACTITIONER

## 2025-03-26 PROCEDURE — 1160F RVW MEDS BY RX/DR IN RCRD: CPT | Performed by: NURSE PRACTITIONER

## 2025-03-26 NOTE — PROGRESS NOTES
Chief Complaint  Follow-up, Atrial Fibrillation, Hyperlipidemia, and Hypertension    Subjective            History of Present Illness  Vance Lorenzo is a 39-year-old male patient who presents to the office today for follow up.    History of Present Illness  The patient presents for follow-up of atrial fibrillation and blood pressure management.    He reports satisfactory tolerance to his current medication regimen, which includes diltiazem 360 mg daily, Eliquis 5 mg twice daily, flecainide 100 mg twice daily, and hydrochlorothiazide 25 mg daily. A few weeks prior, he experienced a mild episode of atrial fibrillation during sleep. He has been experiencing similar symptoms intermittently over the past few months. He is not experiencing any respiratory distress or chest discomfort.     MEDICATIONS  Diltiazem, Eliquis, flecainide, hydrochlorothiazide, rosuvastatin.        PMH  Past Medical History:   Diagnosis Date    Essential hypertension 12/01/2021    Hyperlipidemia LDL goal <100 12/01/2021    Paroxysmal atrial fibrillation 12/01/2021    Sleep apnea     Tinnitus     Xerosis of skin 08/17/2018         ALLERGY  Allergies   Allergen Reactions    Metoprolol Tinnitus          SURGICALHX  Past Surgical History:   Procedure Laterality Date    COLONOSCOPY      COLONOSCOPY N/A 11/13/2024    Procedure: COLONOSCOPY WITH POLYPECTOMY, COLD SNARE;  Surgeon: David Shultz MD;  Location: [facility] ENDOSCOPY;  Service: Gastroenterology;  Laterality: N/A;  COLON POLYP          SOC  Social History     Socioeconomic History    Marital status: Single   Tobacco Use    Smoking status: Never     Passive exposure: Never    Smokeless tobacco: Never   Vaping Use    Vaping status: Never Used   Substance and Sexual Activity    Alcohol use: Never    Drug use: Never    Sexual activity: Defer         FAMHX  Family History   Problem Relation Age of Onset    Breast cancer Maternal Grandmother         50s    Cancer Paternal Grandmother      "Cancer Paternal Grandfather     Heart failure Paternal Grandfather     Colon cancer Neg Hx           MEDSIGONLY  Current Outpatient Medications on File Prior to Visit   Medication Sig    ammonium lactate (LAC-HYDRIN) 12 % lotion Apply  topically to the appropriate area as directed 2 (Two) Times a Day.    dilTIAZem CD (CARDIZEM CD) 360 MG 24 hr capsule Take 1 capsule by mouth Daily.    Eliquis 5 MG tablet tablet TAKE ONE TABLET BY MOUTH TWICE DAILY    famotidine (PEPCID) 40 MG tablet Take 1 tablet by mouth Daily. (Patient taking differently: Take 1 tablet by mouth At Night As Needed.)    flecainide (TAMBOCOR) 100 MG tablet Take 1 tablet by mouth 2 (Two) Times a Day.    hydroCHLOROthiazide 25 MG tablet Take 1 tablet by mouth Daily.    loteprednol (LOTEMAX) 0.5 % ophthalmic suspension Administer 1 drop to both eyes Daily.    rosuvastatin (CRESTOR) 5 MG tablet TAKE ONE TABLET BY MOUTH EVERY NIGHT    ubrogepant (UBRELVY) 50 MG tablet Take 1 tablet by mouth Daily As Needed (migraine).    [DISCONTINUED] magnesium oxide (MAG-OX) 400 MG tablet Take 1 tablet by mouth Daily.     No current facility-administered medications on file prior to visit.       Objective   /85   Pulse 74   Ht 195.6 cm (77.01\")   Wt 116 kg (255 lb 12.8 oz)   BMI 30.33 kg/m²       Physical Exam  HENT:      Head: Normocephalic.   Neck:      Vascular: No carotid bruit.   Cardiovascular:      Rate and Rhythm: Normal rate. Rhythm irregular.      Pulses: Normal pulses.      Heart sounds: Normal heart sounds. No murmur heard.  Pulmonary:      Effort: Pulmonary effort is normal.      Breath sounds: Normal breath sounds.   Musculoskeletal:      Cervical back: Neck supple.      Right lower leg: No edema.      Left lower leg: No edema.   Skin:     General: Skin is dry.   Neurological:      Mental Status: He is alert and oriented to person, place, and time.   Psychiatric:         Behavior: Behavior normal.       ECG 12 Lead    Date/Time: 3/26/2025 8:14 " "AM  Performed by: Samia Veliz APRN    Authorized by: Samia Veliz APRN  Comparison: compared with previous ECG from 7/24/2024  Comparison to previous ECG: Back in atrial flutter  Rhythm: atrial flutter  Rate: normal  BPM: 99  Conduction: conduction normal  T Waves: T waves normal  QRS axis: normal  Other findings: non-specific ST-T wave changes    Clinical impression: abnormal EKG          Result Review :   The following data was reviewed by: MARSHA Crespo on 03/26/2025:  proBNP   Date Value Ref Range Status   02/29/2024 201.7 0.0 - 450.0 pg/mL Final     CMP          8/9/2024    13:37 11/18/2024    14:51   CMP   Glucose 90  97    BUN 8  5    Creatinine 0.82  0.94    EGFR 116.0  107.1    Sodium 141  138    Potassium 3.7  3.5    Chloride 103  100    Calcium 9.8  9.3    Total Protein 7.1  7.6    Albumin 4.5  4.4    Globulin 2.6  3.2    Total Bilirubin 0.8  0.7    Alkaline Phosphatase 71  76    AST (SGOT) 14  14    ALT (SGPT) 14  12    Albumin/Globulin Ratio 1.7  1.4    BUN/Creatinine Ratio 9.8  5.3    Anion Gap 10.4  11.8      CBC w/diff          8/9/2024    13:37 11/18/2024    14:51   CBC w/Diff   WBC 5.69  5.08    RBC 4.74  4.88    Hemoglobin 14.6  15.3    Hematocrit 43.1  42.6    MCV 90.9  87.3    MCH 30.8  31.4    MCHC 33.9  35.9    RDW 11.8  12.2    Platelets 205  212    Neutrophil Rel % 63.6  67.3    Immature Granulocyte Rel % 0.2  0.2    Lymphocyte Rel % 28.3  20.1    Monocyte Rel % 6.5  12.2    Eosinophil Rel % 0.9  0.0    Basophil Rel % 0.5  0.2       Lab Results   Component Value Date    TSH 1.380 05/08/2023      Lab Results   Component Value Date    FREET4 1.1 02/11/2019      No results found for: \"DDIMERQUANT\"  Magnesium   Date Value Ref Range Status   03/01/2024 1.9 1.6 - 2.6 mg/dL Final      No results found for: \"DIGOXIN\"   Lab Results   Component Value Date    TROPONINT 9 02/29/2024           Lipid Panel          8/9/2024    13:37   Lipid Panel   Total Cholesterol 144  "   Triglycerides 83    HDL Cholesterol 39    VLDL Cholesterol 16    LDL Cholesterol  89    LDL/HDL Ratio 2.27        [id number] Score: 1        Assessment and Plan    Diagnoses and all orders for this visit:    1. Paroxysmal atrial fibrillation (Primary)    2. Essential hypertension      Assessment & Plan  1. Atrial flutter.  He reported feeling like he was in atrial fibrillation a couple of weeks ago while trying to sleep. The EKG today shows atrial flutter. The QRS measurement is within the normal range, indicating that an increase in the flecainide dosage is safe. The dosage of flecainide will be increased to 150 mg twice daily, starting tomorrow. He will take 1.5 of his current tablets to achieve this dosage. A repeat EKG is scheduled for Monday to ensure the achievement of sinus rhythm and symptom improvement. An echocardiogram has been ordered to rule out any structural abnormalities in the heart. If sinus rhythm is not achieved with the increased medication, a referral to an electrophysiologist will be considered for potential ablation therapy or a switch to a different antiarrhythmic medication. If symptoms improve on the increased dosage of flecainide, the prescription will be renewed accordingly. If symptoms persist or the EKG continues to show arrhythmia, an earlier appointment will be scheduled.    2. Blood pressure management.  His blood pressure readings are within the normal range today. He is currently taking hydrochlorothiazide 25 mg daily for blood pressure control.      Follow Up   Return in about 6 months (around 9/26/2025) for Follow up with Dr Castellano.    Patient was given instructions and counseling regarding his condition or for health maintenance advice. Please see specific information pulled into the AVS if appropriate.     Vance Lorenzo  reports that he has never smoked. He has never been exposed to tobacco smoke. He has never used smokeless tobacco.            Patient or patient  representative verbalized consent for the use of Ambient Listening during the visit with  MARSHA Crespo for chart documentation. 3/26/2025  08:32 EDT    MARSHA Crespo  03/26/25  08:14 EDT    Dictated Utilizing Dragon Dictation

## 2025-03-31 ENCOUNTER — CLINICAL SUPPORT (OUTPATIENT)
Dept: CARDIOLOGY | Facility: CLINIC | Age: 40
End: 2025-03-31
Payer: COMMERCIAL

## 2025-03-31 DIAGNOSIS — I48.0 PAROXYSMAL ATRIAL FIBRILLATION: Primary | ICD-10-CM

## 2025-03-31 RX ORDER — FLECAINIDE ACETATE 100 MG/1
150 TABLET ORAL 2 TIMES DAILY
Qty: 270 TABLET | Refills: 3 | Status: SHIPPED | OUTPATIENT
Start: 2025-03-31

## 2025-03-31 NOTE — PROGRESS NOTES
ECG 12 Lead    Date/Time: 3/31/2025 1:29 PM  Performed by: Samia Veliz APRN    Authorized by: Samia Veliz APRN  Comparison: compared with previous ECG from 3/26/2025  Comparison to previous ECG: Back in sinus rhythm  Rhythm: sinus rhythm  Rate: normal  BPM: 75  Conduction: non-specific intraventricular conduction delay  ST Segments: ST segments normal  T Waves: T waves normal  QRS axis: normal  Other: no other findings    Clinical impression: abnormal EKG        Continue flecainide 150 mg twice daily. Follow up as scheduled. Notify office if cardiac symptoms develop/worsen

## 2025-04-07 ENCOUNTER — HOSPITAL ENCOUNTER (OUTPATIENT)
Facility: HOSPITAL | Age: 40
Discharge: HOME | End: 2025-04-07
Admitting: NURSE PRACTITIONER
Payer: COMMERCIAL

## 2025-04-07 DIAGNOSIS — I48.0 PAROXYSMAL ATRIAL FIBRILLATION: ICD-10-CM

## 2025-04-07 LAB
AORTIC DIMENSIONLESS INDEX: 0.83 (DI)
ASCENDING AORTA: 3 CM
AV MEAN PRESS GRAD SYS DOP V1V2: 4 MMHG
AV VMAX SYS DOP: 143 CM/SEC
BH CV ECHO MEAS - AO MAX PG: 8.2 MMHG
BH CV ECHO MEAS - AO ROOT DIAM: 2.7 CM
BH CV ECHO MEAS - AO V2 VTI: 28.3 CM
BH CV ECHO MEAS - AVA(I,D): 3.2 CM2
BH CV ECHO MEAS - EDV(CUBED): 185.2 ML
BH CV ECHO MEAS - EDV(MOD-SP2): 143 ML
BH CV ECHO MEAS - EDV(MOD-SP4): 131 ML
BH CV ECHO MEAS - EF(MOD-SP2): 60.4 %
BH CV ECHO MEAS - EF(MOD-SP4): 60.1 %
BH CV ECHO MEAS - ESV(CUBED): 50.7 ML
BH CV ECHO MEAS - ESV(MOD-SP2): 56.6 ML
BH CV ECHO MEAS - ESV(MOD-SP4): 52.3 ML
BH CV ECHO MEAS - FS: 35.1 %
BH CV ECHO MEAS - IVS/LVPW: 0.92 CM
BH CV ECHO MEAS - IVSD: 1.1 CM
BH CV ECHO MEAS - LA DIMENSION: 4.2 CM
BH CV ECHO MEAS - LAT PEAK E' VEL: 17.9 CM/SEC
BH CV ECHO MEAS - LV DIASTOLIC VOL/BSA (35-75): 52.8 CM2
BH CV ECHO MEAS - LV MASS(C)D: 272.5 GRAMS
BH CV ECHO MEAS - LV MAX PG: 5.9 MMHG
BH CV ECHO MEAS - LV MEAN PG: 3 MMHG
BH CV ECHO MEAS - LV SYSTOLIC VOL/BSA (12-30): 21.1 CM2
BH CV ECHO MEAS - LV V1 MAX: 121 CM/SEC
BH CV ECHO MEAS - LV V1 VTI: 23.6 CM
BH CV ECHO MEAS - LVIDD: 5.7 CM
BH CV ECHO MEAS - LVIDS: 3.7 CM
BH CV ECHO MEAS - LVOT AREA: 3.8 CM2
BH CV ECHO MEAS - LVOT DIAM: 2.2 CM
BH CV ECHO MEAS - LVPWD: 1.1 CM
BH CV ECHO MEAS - MED PEAK E' VEL: 11.1 CM/SEC
BH CV ECHO MEAS - MR MAX PG: 28 MMHG
BH CV ECHO MEAS - MR MAX VEL: 264.5 CM/SEC
BH CV ECHO MEAS - MV A MAX VEL: 57.3 CM/SEC
BH CV ECHO MEAS - MV DEC SLOPE: 714 CM/SEC2
BH CV ECHO MEAS - MV DEC TIME: 0.15 SEC
BH CV ECHO MEAS - MV E MAX VEL: 107 CM/SEC
BH CV ECHO MEAS - MV E/A: 1.87
BH CV ECHO MEAS - RAP SYSTOLE: 3 MMHG
BH CV ECHO MEAS - RVDD: 3.3 CM
BH CV ECHO MEAS - RVSP: 22 MMHG
BH CV ECHO MEAS - SV(LVOT): 89.7 ML
BH CV ECHO MEAS - SV(MOD-SP2): 86.4 ML
BH CV ECHO MEAS - SV(MOD-SP4): 78.7 ML
BH CV ECHO MEAS - SVI(LVOT): 36.2 ML/M2
BH CV ECHO MEAS - SVI(MOD-SP2): 34.8 ML/M2
BH CV ECHO MEAS - SVI(MOD-SP4): 31.7 ML/M2
BH CV ECHO MEAS - TR MAX PG: 19.2 MMHG
BH CV ECHO MEAS - TR MAX VEL: 219 CM/SEC
BH CV ECHO MEASUREMENTS AVERAGE E/E' RATIO: 7.38
BH CV XLRA - TDI S': 14.4 CM/SEC
LEFT ATRIUM VOLUME INDEX: 27.6 ML/M2
LV EF BIPLANE MOD: 61.3 %

## 2025-04-07 PROCEDURE — 93306 TTE W/DOPPLER COMPLETE: CPT

## 2025-04-08 ENCOUNTER — RESULTS FOLLOW-UP (OUTPATIENT)
Dept: CARDIOLOGY | Facility: CLINIC | Age: 40
End: 2025-04-08
Payer: COMMERCIAL

## 2025-04-08 NOTE — TELEPHONE ENCOUNTER
Per Samia:  Echocardiogram shows normal heart muscle function and no significant valve disease noted. Follow up as scheduled. Notify office of any new/worsening cardiac symptoms.    Left message for patient to call office.

## 2025-05-23 NOTE — PROGRESS NOTES
Patient Name: Vance Lorenzo   Visit Date: 06/03/2025   Patient ID: [id number]  Provider: MARSHA Sanchez    Sex: male  Location: [facility]   YOB: 1985  Location Address: [address] [place] [address], [address] [place] [address],?[place]?[id number]    Primary Care Provider Onesimo Thayer Jr., MD  Location Phone: [phone]    Referring Provider: Onesimo Thayer *        Chief Complaint  Tinnitus, Establish Care, and Ear pressure     History of Present Illness  Vance Lorenzo is a 40 y.o. male who presents to [facility] for Tinnitus, Establish Care, and Ear pressure   Patient referred to by Dr. Thayer on 1/17/25 for bilateral tinnitus.  Past medical history of hypertension, hyperlipidemia, A-fib, obstructive sleep apnea.  Patient is on Cardizem, Eliquis, HCTZ, rosuvastatin, Ubrelvy.    History of Present Illness  The patient is a 40-year-old male who presents for evaluation of tinnitus.    He has been experiencing recurrent tinnitus, initially diagnosed 10 years ago. Over the past few months, he has reported a sensation of pressure or suction in his right ear, accompanied by temporary hearing loss lasting 1 to 2 minutes. He reports no associated pain but notes occasional cerumen discharge. He has experienced intermittent dizziness over the past few months, although he is uncertain if this coincides with the episodes of tinnitus. A previous audiological evaluation revealed mild hearing loss. He has a history of exposure to loud noises and reports no recent head trauma. He does not smoke or consume alcohol. His daily caffeine intake is minimal, but he occasionally consumes sugary beverages. He alternates between nasal breathing and mouth breathing. Two weeks ago, he experienced significant nasal drainage, which he attributed to allergies. He has not previously used Flonase. He is currently using a CPAP machine. He has a history of atrial  "fibrillation, which is well-managed with Eliquis.    SOCIAL HISTORY  He does not smoke or drink alcohol. He does not drink caffeine but does drink sugary drinks occasionally.    Audiogram from 6/3/2025: SRT of 10 bilaterally with word discrimination scores 100% bilaterally.  Type a tympanograms bilaterally.  Bilateral hearing within normal limits.    Vital Signs:  Vitals:    06/03/25 0830   BP: 144/79   Pulse: 61   Temp: 98.5 °F (36.9 °C)   TempSrc: Temporal   Weight: 116 kg (255 lb 9.6 oz)   Height: 195.6 cm (77\")        Past Medical History:   Diagnosis Date    Essential hypertension 12/01/2021    Hyperlipidemia LDL goal <100 12/01/2021    Paroxysmal atrial fibrillation 12/01/2021    Sleep apnea     Tinnitus     Xerosis of skin 08/17/2018       Past Surgical History:   Procedure Laterality Date    COLONOSCOPY      COLONOSCOPY N/A 11/13/2024    Procedure: COLONOSCOPY WITH POLYPECTOMY, COLD SNARE;  Surgeon: David Shultz MD;  Location: [facility];  Service: Gastroenterology;  Laterality: N/A;  COLON POLYP         Current Outpatient Medications:     ammonium lactate (LAC-HYDRIN) 12 % lotion, Apply  topically to the appropriate area as directed 2 (Two) Times a Day., Disp: 225 g, Rfl: 11    dilTIAZem CD (CARDIZEM CD) 360 MG 24 hr capsule, Take 1 capsule by mouth Daily., Disp: 90 capsule, Rfl: 3    Eliquis 5 MG tablet tablet, TAKE ONE TABLET BY MOUTH TWICE DAILY, Disp: 180 tablet, Rfl: 3    famotidine (PEPCID) 40 MG tablet, Take 1 tablet by mouth Daily. (Patient taking differently: Take 1 tablet by mouth At Night As Needed.), Disp: 90 tablet, Rfl: 1    flecainide (TAMBOCOR) 150 MG tablet, Take 1 & 1/2 tablet BY MOUTH TWICE DAILY, Disp: , Rfl:     hydroCHLOROthiazide 25 MG tablet, Take 1 tablet by mouth Daily., Disp: 90 tablet, Rfl: 1    loteprednol (LOTEMAX) 0.5 % ophthalmic suspension, Administer 1 drop to both eyes Daily., Disp: , Rfl:     rosuvastatin (CRESTOR) 5 MG tablet, TAKE ONE TABLET BY MOUTH " EVERY NIGHT, Disp: 90 tablet, Rfl: 3    ubrogepant (UBRELVY) 50 MG tablet, Take 1 tablet by mouth Daily As Needed (migraine)., Disp: 4 tablet, Rfl: 0    flecainide (TAMBOCOR) 100 MG tablet, Take 1.5 tablets by mouth 2 (Two) Times a Day. (Patient not taking: Reported on 6/3/2025), Disp: 270 tablet, Rfl: 3    fluticasone (FLONASE) 50 MCG/ACT nasal spray, Administer 2 sprays into the nostril(s) as directed by provider Daily. Administer 2 sprays in each nostril for each dose., Disp: 16 g, Rfl: 6     Allergies   Allergen Reactions    Metoprolol Tinnitus       Social History     Tobacco Use    Smoking status: Never     Passive exposure: Never    Smokeless tobacco: Never   Vaping Use    Vaping status: Never Used   Substance Use Topics    Alcohol use: Never    Drug use: Never        Objective     Tobacco Use: Low Risk  (6/3/2025)    Patient History     Smoking Tobacco Use: Never     Smokeless Tobacco Use: Never     Passive Exposure: Never         Physical Exam    Constitutional   Appearance  well developed, well-nourished, alert and in no acute distress, voice clear and strong    Head   Inspection  no deformities or lesions, atraumatic    Face   Inspection  no facial lesions; House-Brackmann I/VI bilaterally   Palpation  no TMJ crepitus nor  muscle tenderness bilaterally     Eyes/Vision   Visual Fields  extraocular movements are intact, no spontaneous or gaze-induced nystagmus  Conjunctivae  clear   Sclerae  clear   Pupils and Irises  pupils equal, round, and reactive to light.   Nystagmus  not present     Ears, Nose, Mouth and Throat  Ears  External Ears  Auricles appearance within normal limits, no lesions present   Otoscopic Examination  tympanic membrane appearance within normal limits bilaterally without perforations, well-aerated middle ears without evidence of effusion  Hearing  intact to conversational voice both ears   Tunning fork testing    Rinne:  Rust:    Nose  External Nose  appearance normal    Intranasal Exam  mucosa mild diffuse erythema with bilateral inferior turbinate hypertrophy with clear discharge, vestibules normal, no intranasal lesions present, septum midline, sinuses non tender to percussion   Modified Le Sueur Test:    Oral Cavity  Oral Mucosa  oral mucosa normal without pallor or cyanosis   Stensen's and Warthin's ducts are productive and patent with clear saliva  Lips  lip appearance normal   Teeth  normal dentition for age   Gums  gums pink, non-swollen, no bleeding present   Tongue  tongue appearance normal; normal mobility   Palate  hard palate normal, soft palate appearance normal with symmetric mobility     Throat  Oropharynx  no inflammation or lesions present  Tonsils  Bilateral tonsils unremarkable  Hypopharynx  appearance within normal limits   Larynx  voice normal     Neck  Inspection/Palpation  normal appearance, no masses or tenderness, trachea midline; thyroid size normal, nontender, no nodules or masses present on palpation     Lymphatic  Neck  no lymphadenopathy present   Supraclavicular Nodes  no lymphadenopathy present   Preauricular Nodes  no lymphadenopathy present     Respiratory  Respiratory Effort  breathing unlabored   Inspection of Chest  normal appearance, no retractions     Musculoskeletal   Cervical back: Normal range of motion and neck supple.      Skin and Subcutaneous Tissue  General Inspection  Regarding face and neck - there are no rashes present, no lesions present, and no areas of discoloration     Neurologic  Cranial Nerves  Alert and oriented x3  cranial nerves II-XII are grossly intact bilaterally   Gait and Station  normal gait, able to stand without diffculty    Psychiatric  Judgement and Insight  judgment and insight intact   Mood and Affect  mood normal, affect appropriate       RESULTS REVIEWED    I have reviewed the following information:   []  Previous Internal Note  [x]  Previous External Note:   [x]  Ordered Tests & Results:      Pathology:   Lab  Results   Component Value Date    Microscopic Description  11/13/2024     Microscopic examination performed.         TSH   Date Value Ref Range Status   05/08/2023 1.380 0.270 - 4.200 uIU/mL Final     Free T4   Date Value Ref Range Status   02/11/2019 1.1 0.9 - 1.8 ng/dL Final     Calcium   Date Value Ref Range Status   11/18/2024 9.3 8.6 - 10.5 mg/dL Final       No Images in the past 120 days found..    Results  Diagnostic Testing   - Hearing test: 06/03/2025, No significant hearing loss.      I have discussed the interpretation of the above results with the patient.    Procedures          Assessment and Plan   Diagnoses and all orders for this visit:    1. Tinnitus of both ears (Primary)  -     Audiometry With Tympanometry    2. History of exposure to noise  -     Audiometry With Tympanometry    3. ETD (Eustachian tube dysfunction), bilateral  -     fluticasone (FLONASE) 50 MCG/ACT nasal spray; Administer 2 sprays into the nostril(s) as directed by provider Daily. Administer 2 sprays in each nostril for each dose.  Dispense: 16 g; Refill: 6        Assessment & Plan  1. Tinnitus and eustachian tube dysfunction.  The tinnitus may be exacerbated by certain medications. The sensation of suction and diminished hearing could be indicative of eustachian tube dysfunction, potentially due to inflammation from viral or allergic causes. The use of a CPAP machine may also contribute to this condition. The absence of fluid suggests that the issue lies with inadequate pressure equalization. A hearing test was conducted and showed normal results. A prescription for Flonase nasal spray has been provided, with instructions to administer 2 sprays towards each eye. Avoiding caffeine, alcohol, nicotine, and chocolate, ensuring quality sleep, and continuing to use the sleep apnea machine were recommended. Exploring white noise apps or websites to identify a sound that can effectively mask the tinnitus was also  suggested.    Follow-up  Follow-up in 6 months. Consistent use of Flonase for 2 weeks is necessary to see a difference, and within a month, the effectiveness should be evident. If needed, an earlier appointment can be arranged.      (H93.13) Tinnitus of both ears - Plan: Audiometry With Tympanometry    (Z77.122) History of exposure to noise - Plan: Audiometry With Tympanometry    (H69.93) ETD (Eustachian tube dysfunction), bilateral - Plan: fluticasone (FLONASE) 50 MCG/ACT nasal spray     Vance Lorenzo  reports that he has never smoked. He has never been exposed to tobacco smoke. He has never used smokeless tobacco.       Plan:  Patient Instructions   Proper Instillation of Intranasal sprays:  Blow nose to clear nostrils.  Place the tip of the nozzle in one nostril and close the other nostril with your finger.  Aim slightly away from the center of your nose, toward the corner of the eye, press the white nozzle, and sniff the mist in gently. Be careful not to spray into your eyes.  Exhale through the mouth.  Repeat process in other nostril.   -The recommended starting dose for Flonase in adults is 2 sprays in each nostril once a day.  -Your provider may have recommended use of nasal saline or nasal navage use to rinse your sinuses. If this is the case, it would be recommended use use saline prior to instillation of intranasal medications to rinse the sinus surface prior to the medication.  -Avoid use of nasal saline or nasal rinses within 30 minutes after intranasal medication use, as to allow the medication to fully absorb.     TINNITUS  Tinnitus (latin for ringing) is the sensation of noise in the ears. Tinnitus is a condition where noises in the ear or the head may be heard but which have no external cause.  The intensity and type of noise varies from each sufferer, but the most common reports are of ringing noises and high-pitched pure tones.  These noises may be heard all day or for short spells throughout  the day.  This may cause lack of concentration and sleep and in some cases may cause severe discomfort. Most people have had ringing in the ears but most do not require treatment. Only 6% of people have ringing troubling enough to seek treatment.    he noise heard by suffers is often said to sound like ringing, roaring, hissing or clicking in the ear.  This noise is heard only by the person suffering from the condition and has no external cause. Almost everybody may experience noises in the ears or head if quiet ambient conditions prevail, but these are usually of low intensity and short duration. Timing can vary as well. There are no specific symptom requirements to have tinnitus. It is speculated that the inner ear hair cells (responsible for hearing) may be dying and causing the brain to seek additional input for sound that is missing. There are many theories for the etiology of tinnitus or ringing.    What causes Tinnitus?  Scientific and medical knowledge about tinnitus is limited, but research has shown the tinnitus may be caused by exposure to loud noises, disease or a hearing loss. But the majority of cases appear to involve the inner ear, the neural pathways between the ear and the brain itself.  Diet can also be a contributing factor, as some foods are generally identified as causing or aggravating tinnitus.     Alcohol excess alcohol can induce tinnitus in almost everyone.   Salt  Reducing salt intake may help reduce body fluids and help tinnitus.   Caffeine May stimulate the nervous system  Cheese (and other dairy products) are the most commonly identified foods causing or aggravating tinnitus.    You may be referred for hearing testing or imaging of the ears/brain to try to determine what is causing ringing and how to best treat the condition.    Cure or Remedy  A cure for tinnitus has not presently been identified as there is insufficient knowledge on the subject.  However, it has been established that  a successful method of relieving the symptoms of tinnitus is to mask the noises. These sounds generated electronically at a sufficient frequency and intensity to “block out” or mask the noises within the ear/head.  For users of hearing aids, the hearing aid on its own may provide sufficient sound to relieve the tinnitus.          Tinnitus Recommendations-  >Avoid loud or sudden noise  >Wear hearing protection in the presence of loud noise  >Avoid irritants like caffeine, nicotine, tonic water, malaria medications, alcohol, aspirin- please tell MD if you are taking any of these medications  >In a quiet environment or while sleeping, use a white noise generator or radio station to provide background noise  >Relaxation and stress management techniques are useful  >Biofeedback  >Complementary medicine may be of benefit  >Wear a hearing aid or tinnitus masker/retrainer  >Psychological counseling may be beneficial in some situations  >Exercise!!  >Restorative Sleep!!     Treating tinnitus can be difficult and frustrating. There really is no cure but rather control. Please be patient.     Follow Up   Return in about 6 months (around 12/3/2025).  Patient was given instructions and counseling regarding his condition or for health maintenance advice. Please see specific information pulled into the AVS if appropriate.      Patient or patient representative verbalized consent for the use of Ambient Listening during the visit with  MARSHA Sanchez for chart documentation. 6/3/2025  09:07 EDT    All or a portion of this Note was dictated utilizing Dragon Dictation.

## 2025-06-02 RX ORDER — ROSUVASTATIN CALCIUM 5 MG/1
5 TABLET, COATED ORAL
Qty: 90 TABLET | Refills: 3 | Status: SHIPPED | OUTPATIENT
Start: 2025-06-02

## 2025-06-03 ENCOUNTER — PATIENT ROUNDING (BHMG ONLY) (OUTPATIENT)
Dept: OTOLARYNGOLOGY | Facility: CLINIC | Age: 40
End: 2025-06-03

## 2025-06-03 ENCOUNTER — OFFICE VISIT (OUTPATIENT)
Dept: OTOLARYNGOLOGY | Facility: CLINIC | Age: 40
End: 2025-06-03
Payer: COMMERCIAL

## 2025-06-03 ENCOUNTER — PROCEDURE VISIT (OUTPATIENT)
Dept: OTOLARYNGOLOGY | Facility: CLINIC | Age: 40
End: 2025-06-03
Payer: COMMERCIAL

## 2025-06-03 VITALS
SYSTOLIC BLOOD PRESSURE: 144 MMHG | HEIGHT: 77 IN | TEMPERATURE: 98.5 F | HEART RATE: 61 BPM | WEIGHT: 255.6 LBS | BODY MASS INDEX: 30.18 KG/M2 | DIASTOLIC BLOOD PRESSURE: 79 MMHG

## 2025-06-03 DIAGNOSIS — H69.93 ETD (EUSTACHIAN TUBE DYSFUNCTION), BILATERAL: ICD-10-CM

## 2025-06-03 DIAGNOSIS — Z77.122 HISTORY OF EXPOSURE TO NOISE: ICD-10-CM

## 2025-06-03 DIAGNOSIS — H93.13 TINNITUS OF BOTH EARS: Primary | ICD-10-CM

## 2025-06-03 PROCEDURE — 99213 OFFICE O/P EST LOW 20 MIN: CPT

## 2025-06-03 PROCEDURE — 92557 COMPREHENSIVE HEARING TEST: CPT | Performed by: AUDIOLOGIST

## 2025-06-03 PROCEDURE — 1159F MED LIST DOCD IN RCRD: CPT

## 2025-06-03 PROCEDURE — 1160F RVW MEDS BY RX/DR IN RCRD: CPT

## 2025-06-03 PROCEDURE — 92567 TYMPANOMETRY: CPT | Performed by: AUDIOLOGIST

## 2025-06-03 PROCEDURE — 3078F DIAST BP <80 MM HG: CPT

## 2025-06-03 PROCEDURE — 3077F SYST BP >= 140 MM HG: CPT

## 2025-06-03 RX ORDER — FLECAINIDE ACETATE 150 MG/1
TABLET ORAL
COMMUNITY
Start: 2025-04-28

## 2025-06-03 RX ORDER — FLUTICASONE PROPIONATE 50 MCG
2 SPRAY, SUSPENSION (ML) NASAL DAILY
Qty: 16 G | Refills: 6 | Status: SHIPPED | OUTPATIENT
Start: 2025-06-03

## 2025-06-03 NOTE — PROGRESS NOTES
AUDIOMETRIC EVALUATION      Name:  Vance Lorenzo  :  1985  Age:  40 y.o.  Date of Evaluation:  2025       History:  Mr. Lorenzo is seen today for a hearing evaluation due to aural fullness at the right ear.    Audiologic Information:  Concerns for Hearing: No  PETs: None  Other otologic surgical history: None  Aural Pressure/Fullness: Right ear  Otalgia: No  Otorrhea: None  Tinnitus: No  Dizziness: No  Noise Exposure: None  Family history of hearing loss: No  Head trauma requiring hospital stay: No  Chemotherapy: No  Other significant history: None      EVALUATION:    See audiogram    RESULTS:    Otoscopic Evaluation:  Right: Mildly retracted  Left: Mildly retracted        NOTE: Testing completed after ears were examined by this practitioner    Tympanometry (226 Hz):  Right: Type A  Left: Type A    IMPRESSIONS:  Pure tone thresholds for the right ear shows hearing within normal limits.  Pure tone thresholds for the left ear shows hearing within normal limits.  Patient was counseled with regard to the findings.    RECOMMENDATIONS/PLAN:  Follow-up recommendations with the nurse practitioner Elba Haywood.  Discussed results and recommendations with patient. Questions were addressed and the patient was encouraged to contact our department should concerns arise.          Corky Evangelista M.S, [facility]  Licensed Audiologist

## 2025-06-03 NOTE — PROGRESS NOTES
A [employer] message has been send to the patient for PATIENT ROUNDING with [facility].

## 2025-06-03 NOTE — PATIENT INSTRUCTIONS
Proper Instillation of Intranasal sprays:  Blow nose to clear nostrils.  Place the tip of the nozzle in one nostril and close the other nostril with your finger.  Aim slightly away from the center of your nose, toward the corner of the eye, press the white nozzle, and sniff the mist in gently. Be careful not to spray into your eyes.  Exhale through the mouth.  Repeat process in other nostril.   -The recommended starting dose for Flonase in adults is 2 sprays in each nostril once a day.  -Your provider may have recommended use of nasal saline or nasal navage use to rinse your sinuses. If this is the case, it would be recommended use use saline prior to instillation of intranasal medications to rinse the sinus surface prior to the medication.  -Avoid use of nasal saline or nasal rinses within 30 minutes after intranasal medication use, as to allow the medication to fully absorb.     TINNITUS  Tinnitus (latin for ringing) is the sensation of noise in the ears. Tinnitus is a condition where noises in the ear or the head may be heard but which have no external cause.  The intensity and type of noise varies from each sufferer, but the most common reports are of ringing noises and high-pitched pure tones.  These noises may be heard all day or for short spells throughout the day.  This may cause lack of concentration and sleep and in some cases may cause severe discomfort. Most people have had ringing in the ears but most do not require treatment. Only 6% of people have ringing troubling enough to seek treatment.    he noise heard by suffers is often said to sound like ringing, roaring, hissing or clicking in the ear.  This noise is heard only by the person suffering from the condition and has no external cause. Almost everybody may experience noises in the ears or head if quiet ambient conditions prevail, but these are usually of low intensity and short duration. Timing can vary as well. There are no specific symptom  requirements to have tinnitus. It is speculated that the inner ear hair cells (responsible for hearing) may be dying and causing the brain to seek additional input for sound that is missing. There are many theories for the etiology of tinnitus or ringing.    What causes Tinnitus?  Scientific and medical knowledge about tinnitus is limited, but research has shown the tinnitus may be caused by exposure to loud noises, disease or a hearing loss. But the majority of cases appear to involve the inner ear, the neural pathways between the ear and the brain itself.  Diet can also be a contributing factor, as some foods are generally identified as causing or aggravating tinnitus.     Alcohol excess alcohol can induce tinnitus in almost everyone.   Salt  Reducing salt intake may help reduce body fluids and help tinnitus.   Caffeine May stimulate the nervous system  Cheese (and other dairy products) are the most commonly identified foods causing or aggravating tinnitus.    You may be referred for hearing testing or imaging of the ears/brain to try to determine what is causing ringing and how to best treat the condition.    Cure or Remedy  A cure for tinnitus has not presently been identified as there is insufficient knowledge on the subject.  However, it has been established that a successful method of relieving the symptoms of tinnitus is to mask the noises. These sounds generated electronically at a sufficient frequency and intensity to “block out” or mask the noises within the ear/head.  For users of hearing aids, the hearing aid on its own may provide sufficient sound to relieve the tinnitus.          Tinnitus Recommendations-  >Avoid loud or sudden noise  >Wear hearing protection in the presence of loud noise  >Avoid irritants like caffeine, nicotine, tonic water, malaria medications, alcohol, aspirin- please tell MD if you are taking any of these medications  >In a quiet environment or while sleeping, use a white noise  generator or radio station to provide background noise  >Relaxation and stress management techniques are useful  >Biofeedback  >Complementary medicine may be of benefit  >Wear a hearing aid or tinnitus masker/retrainer  >Psychological counseling may be beneficial in some situations  >Exercise!!  >Restorative Sleep!!     Treating tinnitus can be difficult and frustrating. There really is no cure but rather control. Please be patient.

## 2025-06-24 ENCOUNTER — OFFICE VISIT (OUTPATIENT)
Dept: INTERNAL MEDICINE | Facility: CLINIC | Age: 40
End: 2025-06-24
Payer: COMMERCIAL

## 2025-06-24 VITALS
SYSTOLIC BLOOD PRESSURE: 138 MMHG | HEIGHT: 77 IN | DIASTOLIC BLOOD PRESSURE: 83 MMHG | BODY MASS INDEX: 30.2 KG/M2 | HEART RATE: 70 BPM | WEIGHT: 255.8 LBS | OXYGEN SATURATION: 96 % | TEMPERATURE: 97.8 F

## 2025-06-24 DIAGNOSIS — I10 ESSENTIAL HYPERTENSION: Primary | ICD-10-CM

## 2025-06-24 DIAGNOSIS — I48.0 PAROXYSMAL ATRIAL FIBRILLATION: ICD-10-CM

## 2025-06-24 DIAGNOSIS — Z23 NEED FOR TETANUS BOOSTER: ICD-10-CM

## 2025-06-24 DIAGNOSIS — E78.2 MIXED HYPERLIPIDEMIA: ICD-10-CM

## 2025-06-24 NOTE — LETTER
[facility]  Vaccine Consent Form    Patient Name:  Vance Lorenzo  Patient :  1985     Vaccine(s) Ordered    Tdap Vaccine Greater Than or Equal To 6yo IM        Screening Checklist  The following questions should be completed prior to vaccination. If you answer “yes” to any question, it does not necessarily mean you should not be vaccinated. It just means we may need to clarify or ask more questions. If a question is unclear, please ask your healthcare provider to explain it.    Yes No   Any fever or moderate to severe illness today (mild illness and/or antibiotic treatment are not contraindications)?     Do you have a history of a serious reaction to any previous vaccinations, such as anaphylaxis, encephalopathy within 7 days, Guillain-Ona syndrome within 6 weeks, seizure?     Have you received any live vaccine(s) (e.g MMR, HENRIQUE) or any other vaccines in the last month (to ensure duplicate doses aren't given)?     Do you have an anaphylactic allergy to latex (DTaP, DTaP-IPV, Hep A, Hep B, MenB, RV, Td, Tdap), baker’s yeast (Hep B, HPV), polysorbates (RSV, nirsevimab, PCV 20 and 21, Rotavirrus, Tdap, Shingrix), or gelatin (HENRIQUE, MMR)?     Do you have an anaphylactic allergy to neomycin (Rabies, HENRIQUE, MMR, IPV, Hep A), polymyxin B (IPV), or streptomycin (IPV)?      Any cancer, leukemia, AIDS, or other immune system disorder? (HENRIQUE, MMR, RV)     Do you have a parent, brother, or sister with an immune system problem (if immune competence of vaccine recipient clinically verified, can proceed)? (MMR, HENRIQUE)     Any recent steroid treatments for >2 weeks, chemotherapy, or radiation treatment? (HENRIQUE, MMR)     Have you received antibody-containing blood transfusions or IVIG in the past 11 months (recommended interval is dependent on product)? (MMR, HENRIQUE)     Have you taken antiviral drugs (acyclovir, famciclovir, valacyclovir for HENRIQUE) in the last 24 or 48 hours, respectively?      Are you pregnant or planning to  "become pregnant within 1 month? (HENRIQUE, MMR, HPV, IPV, MenB, Abrexvy; For Hep B- refer to Engerix-B; For RSV - Abrysvo is indicated for 32-36 weeks of pregnancy from September to January)     For infants, have you ever been told your child has had intussusception or a medical emergency involving obstruction of the intestine (Rotavirus)? If not for an infant, can skip this question.         *Ordering Physicians/APC should be consulted if \"yes\" is checked by the patient or guardian above.  I have received, read, and understand the Vaccine Information Statement (VIS) for each vaccine ordered.  I have considered my or my child's health status as well as the health status of my close contacts.  I have taken the opportunity to discuss my vaccine questions with my or my child's health care provider.   I have requested that the ordered vaccine(s) be given to me or my child.  I understand the benefits and risks of the vaccines.  I understand that I should remain in the clinic for 15 minutes after receiving the vaccine(s).  _________________________________________________________  Signature of Patient or Parent/Legal Guardian ____________________  Date     "

## 2025-06-24 NOTE — PROGRESS NOTES
"Chief Complaint  Hypertension (Follow up )    Subjective          Vance Lorezno presents to [facility]  History of Present Illness  Hypertension- patient reports feeling well. Patient denies headaches, dizziness, chest pain. No side effects on regimen.  Afib- patient is on flecainide and diltiazem. He continue to follow-up with cardiology. Doing well on eliquis without bleeding issues  Hyperlipidemia- doing well on statin      Current Outpatient Medications   Medication Instructions    ammonium lactate (LAC-HYDRIN) 12 % lotion Topical, 2 Times Daily    dilTIAZem CD (CARDIZEM CD) 360 mg, Oral, Every 24 Hours Scheduled    Eliquis 5 MG tablet tablet TAKE ONE TABLET BY MOUTH TWICE DAILY    famotidine (PEPCID) 40 mg, Oral, Daily    flecainide (TAMBOCOR) 150 MG tablet Take 1 & 1/2 tablet BY MOUTH TWICE DAILY    fluticasone (FLONASE) 50 MCG/ACT nasal spray 2 sprays, Nasal, Daily, Administer 2 sprays in each nostril for each dose.    hydroCHLOROthiazide 25 mg, Oral, Daily    loteprednol (LOTEMAX) 0.5 % ophthalmic suspension Administer 1 drop to both eyes Daily.    rosuvastatin (CRESTOR) 5 mg, Oral, Every Night at Bedtime    ubrogepant (UBRELVY) 50 mg, Oral, Daily PRN       The following portions of the patient's history were reviewed and updated as appropriate: allergies, current medications, past family history, past medical history, past social history, past surgical history, and problem list.    Objective   Vital Signs:   /83 (BP Location: Left arm, Patient Position: Sitting, Cuff Size: Adult)   Pulse 70   Temp 97.8 °F (36.6 °C) (Temporal)   Ht 195.6 cm (77\")   Wt 116 kg (255 lb 12.8 oz)   SpO2 96%   BMI 30.33 kg/m²     BP Readings from Last 3 Encounters:   06/24/25 138/83   06/03/25 144/79   03/26/25 128/85     Wt Readings from Last 3 Encounters:   06/24/25 116 kg (255 lb 12.8 oz)   06/03/25 116 kg (255 lb 9.6 oz)   03/26/25 116 kg (255 lb 12.8 oz) "     Physical Exam   Appearance: No acute distress, well-nourished  Head: normocephalic, atraumatic  Eyes: extraocular movements intact, no scleral icterus, no conjunctival injection  Ears, Nose, and Throat: external ears normal, nares patent, moist mucous membranes  Cardiovascular: regular rate and rhythm. no murmurs, rubs, or gallops. no edema  Respiratory: breathing comfortably, symmetric chest rise, clear to auscultation bilaterally. No wheezes, rales, or rhonchi.  Neuro: alert and oriented to time, place, and person. Normal gait  Psych: normal mood and affect     Result Review :   The following data was reviewed by: Onesimo Thayer Jr, MD on 06/24/2025:  Common labs          8/9/2024    13:37 11/18/2024    14:51   Common Labs   Glucose 90  97    BUN 8  5    Creatinine 0.82  0.94    Sodium 141  138    Potassium 3.7  3.5    Chloride 103  100    Calcium 9.8  9.3    Albumin 4.5  4.4    Total Bilirubin 0.8  0.7    Alkaline Phosphatase 71  76    AST (SGOT) 14  14    ALT (SGPT) 14  12    WBC 5.69  5.08    Hemoglobin 14.6  15.3    Hematocrit 43.1  42.6    Platelets 205  212    Total Cholesterol 144     Triglycerides 83     HDL Cholesterol 39     LDL Cholesterol  89     Hemoglobin A1C 5.20              Lab Results   Component Value Date    SARSANTIGEN Not Detected 12/07/2024    COVID19 Not Detected 03/04/2024    RAPFLUA Negative 12/07/2024    RAPFLUB Negative 12/07/2024    FLUAAG Not Detected 03/04/2024    FLUBAG Not Detected 03/04/2024    RAPSCRN Negative 12/07/2024    INR 1.20 (H) 02/01/2024    BILIRUBINUR Negative 11/18/2024        Assessment and Plan    Diagnoses and all orders for this visit:    1. Essential hypertension (Primary)  Comments:  doing well. goal BP <130/80    2. Mixed hyperlipidemia  Comments:  cont statin. goal LDL <55.    3. Paroxysmal atrial fibrillation  Comments:  rate controlled. CHADSVASC=1. cont eliquis.  Overview:  Failed flecainide 50 twice daily uptitrated on 2/24      4. Need for  tetanus booster  -     Tdap Vaccine Greater Than or Equal To 8yo IM          Medications Discontinued During This Encounter   Medication Reason    flecainide (TAMBOCOR) 100 MG tablet *Error          Follow Up   Return in about 6 months (around 12/24/2025) for HTN.  Patient was given instructions and counseling regarding his condition or for health maintenance advice. Please see specific information pulled into the AVS if appropriate.       Onesimo Thayer Jr, MD  07/10/25  09:49 EDT

## 2025-07-02 DIAGNOSIS — L74.4 ANHIDROSIS: ICD-10-CM

## 2025-07-02 RX ORDER — AMMONIUM LACTATE 12 G/100G
LOTION TOPICAL 2 TIMES DAILY
Qty: 225 G | Refills: 11 | Status: SHIPPED | OUTPATIENT
Start: 2025-07-02

## (undated) DEVICE — LINER SURG CANSTR SXN S/RIGD 1500CC

## (undated) DEVICE — SOL IRRG H2O PL/BG 1000ML STRL

## (undated) DEVICE — CONN JET HYDRA H20 AUXILIARY DISP

## (undated) DEVICE — SOLIDIFIER LIQLOC PLS 1500CC BT

## (undated) DEVICE — SNAR POLYP CAPTIFLEX XS/OVL 11X2.4MM 240CM 1P/U

## (undated) DEVICE — Device

## (undated) DEVICE — Device: Brand: DEFENDO AIR/WATER/SUCTION AND BIOPSY VALVE

## (undated) DEVICE — THE SINGLE USE ETRAP – POLYP TRAP IS USED FOR SUCTION RETRIEVAL OF ENDOSCOPICALLY REMOVED POLYPS.: Brand: ETRAP